# Patient Record
Sex: MALE | Race: WHITE | Employment: OTHER | URBAN - METROPOLITAN AREA
[De-identification: names, ages, dates, MRNs, and addresses within clinical notes are randomized per-mention and may not be internally consistent; named-entity substitution may affect disease eponyms.]

---

## 2017-03-07 PROBLEM — M48.07 SPINAL STENOSIS, LUMBOSACRAL REGION: Status: ACTIVE | Noted: 2017-03-07

## 2017-05-05 PROBLEM — E11.42 DIABETIC PERIPHERAL NEUROPATHY (HCC): Status: ACTIVE | Noted: 2017-05-05

## 2017-06-13 PROBLEM — D46.20 LOW GRADE MYELODYSPLASTIC SYNDROME LESIONS (HCC): Status: ACTIVE | Noted: 2017-06-13

## 2017-07-21 PROBLEM — M54.50 LOW BACK PAIN: Status: ACTIVE | Noted: 2017-07-21

## 2017-07-21 PROBLEM — M79.605 PAIN OF LEFT LOWER EXTREMITY: Status: ACTIVE | Noted: 2017-07-21

## 2017-09-11 PROBLEM — R74.8 CARDIAC ENZYMES ELEVATED: Status: ACTIVE | Noted: 2017-09-11

## 2017-09-11 PROBLEM — I95.81 POSTPROCEDURAL HYPOTENSION: Status: ACTIVE | Noted: 2017-09-11

## 2017-09-11 LAB
LEFT VENTRICULAR EJECTION FRACTION HIGH VALUE: 65 %
LEFT VENTRICULAR EJECTION FRACTION MODE: NORMAL
LV EF: 60 %

## 2017-09-25 PROBLEM — K92.2 LOWER GI BLEED: Status: ACTIVE | Noted: 2017-09-25

## 2017-09-29 PROBLEM — E43 SEVERE PROTEIN-CALORIE MALNUTRITION (HCC): Chronic | Status: ACTIVE | Noted: 2017-09-29

## 2018-03-02 PROBLEM — R07.9 CHEST PAIN: Status: ACTIVE | Noted: 2018-03-02

## 2018-03-03 PROBLEM — K85.90 ACUTE PANCREATITIS: Status: ACTIVE | Noted: 2018-03-03

## 2018-03-22 ENCOUNTER — HOSPITAL ENCOUNTER (OUTPATIENT)
Age: 83
Discharge: HOME | End: 2018-03-24
Payer: MEDICARE

## 2018-03-22 DIAGNOSIS — D72.829 LEUKOCYTOSIS, UNSPECIFIED TYPE: ICD-10-CM

## 2018-03-22 DIAGNOSIS — E11.69 TYPE 2 DIABETES MELLITUS WITH OTHER SPECIFIED COMPLICATION, UNSPECIFIED LONG TERM INSULIN USE STATUS: Chronic | ICD-10-CM

## 2018-03-22 LAB
BASOPHILS ABSOLUTE: 0.1 E9/L (ref 0–0.2)
BASOPHILS RELATIVE PERCENT: 0.8 % (ref 0–2)
EOSINOPHILS ABSOLUTE: 0.19 E9/L (ref 0.05–0.5)
EOSINOPHILS RELATIVE PERCENT: 1.5 % (ref 0–6)
HBA1C MFR BLD: 5.5 % (ref 4.8–5.9)
HCT VFR BLD CALC: 34.2 % (ref 37–54)
HEMOGLOBIN: 10.6 G/DL (ref 12.5–16.5)
IMMATURE GRANULOCYTES #: 0.11 E9/L
IMMATURE GRANULOCYTES %: 0.9 % (ref 0–5)
LYMPHOCYTES ABSOLUTE: 1.76 E9/L (ref 1.5–4)
LYMPHOCYTES RELATIVE PERCENT: 13.7 % (ref 20–42)
MCH RBC QN AUTO: 26.2 PG (ref 26–35)
MCHC RBC AUTO-ENTMCNC: 31 % (ref 32–34.5)
MCV RBC AUTO: 84.4 FL (ref 80–99.9)
MONOCYTES ABSOLUTE: 1.3 E9/L (ref 0.1–0.95)
MONOCYTES RELATIVE PERCENT: 10.1 % (ref 2–12)
NEUTROPHILS ABSOLUTE: 9.4 E9/L (ref 1.8–7.3)
NEUTROPHILS RELATIVE PERCENT: 73 % (ref 43–80)
PDW BLD-RTO: 18.9 FL (ref 11.5–15)
PLATELET # BLD: 325 E9/L (ref 130–450)
PMV BLD AUTO: 12.5 FL (ref 7–12)
RBC # BLD: 4.05 E12/L (ref 3.8–5.8)
WBC # BLD: 12.9 E9/L (ref 4.5–11.5)

## 2018-03-22 PROCEDURE — 85025 COMPLETE CBC W/AUTO DIFF WBC: CPT

## 2018-03-22 PROCEDURE — 83036 HEMOGLOBIN GLYCOSYLATED A1C: CPT

## 2018-03-26 ENCOUNTER — TELEPHONE (OUTPATIENT)
Dept: CARDIOLOGY CLINIC | Age: 83
End: 2018-03-26

## 2018-04-05 PROBLEM — E43 SEVERE PROTEIN-CALORIE MALNUTRITION (HCC): Chronic | Status: RESOLVED | Noted: 2017-09-29 | Resolved: 2018-04-05

## 2018-04-10 ENCOUNTER — HOSPITAL ENCOUNTER (OUTPATIENT)
Age: 83
Discharge: HOME | End: 2018-04-12

## 2018-04-10 PROCEDURE — 88304 TISSUE EXAM BY PATHOLOGIST: CPT

## 2018-04-20 RX ORDER — ALLOPURINOL 300 MG/1
300 TABLET ORAL DAILY
Qty: 30 TABLET | Refills: 11 | Status: SHIPPED
Start: 2018-04-20 | End: 2020-07-10 | Stop reason: SDUPTHER

## 2018-06-22 ENCOUNTER — HOSPITAL ENCOUNTER (OUTPATIENT)
Age: 83
Discharge: HOME | End: 2018-06-24
Payer: MEDICARE

## 2018-06-22 DIAGNOSIS — D46.20 LOW GRADE MYELODYSPLASTIC SYNDROME LESIONS (HCC): ICD-10-CM

## 2018-06-22 DIAGNOSIS — E78.5 DYSLIPIDEMIA: Chronic | ICD-10-CM

## 2018-06-22 DIAGNOSIS — E11.42 DIABETIC PERIPHERAL NEUROPATHY (HCC): ICD-10-CM

## 2018-06-22 LAB
ALBUMIN SERPL-MCNC: 4.1 G/DL (ref 3.5–5.2)
ALP BLD-CCNC: 37 U/L (ref 40–129)
ALT SERPL-CCNC: 17 U/L (ref 0–40)
ANION GAP SERPL CALCULATED.3IONS-SCNC: 12 MMOL/L (ref 7–16)
AST SERPL-CCNC: 28 U/L (ref 0–39)
BASOPHILS ABSOLUTE: 0.13 E9/L (ref 0–0.2)
BASOPHILS RELATIVE PERCENT: 1.1 % (ref 0–2)
BILIRUB SERPL-MCNC: 0.3 MG/DL (ref 0–1.2)
BUN BLDV-MCNC: 20 MG/DL (ref 8–23)
CALCIUM SERPL-MCNC: 9.3 MG/DL (ref 8.6–10.2)
CHLORIDE BLD-SCNC: 97 MMOL/L (ref 98–107)
CHOLESTEROL, TOTAL: 149 MG/DL (ref 0–199)
CO2: 23 MMOL/L (ref 22–29)
CREAT SERPL-MCNC: 1 MG/DL (ref 0.7–1.2)
EOSINOPHILS ABSOLUTE: 0.23 E9/L (ref 0.05–0.5)
EOSINOPHILS RELATIVE PERCENT: 2 % (ref 0–6)
GFR AFRICAN AMERICAN: >60
GFR NON-AFRICAN AMERICAN: >60 ML/MIN/1.73
GLUCOSE BLD-MCNC: 101 MG/DL (ref 74–109)
HBA1C MFR BLD: 5.9 % (ref 4.8–5.9)
HCT VFR BLD CALC: 36.3 % (ref 37–54)
HDLC SERPL-MCNC: 35 MG/DL
HEMOGLOBIN: 11.4 G/DL (ref 12.5–16.5)
IMMATURE GRANULOCYTES #: 0.09 E9/L
IMMATURE GRANULOCYTES %: 0.8 % (ref 0–5)
LDL CHOLESTEROL CALCULATED: 100 MG/DL (ref 0–99)
LYMPHOCYTES ABSOLUTE: 1.69 E9/L (ref 1.5–4)
LYMPHOCYTES RELATIVE PERCENT: 14.9 % (ref 20–42)
MCH RBC QN AUTO: 27.3 PG (ref 26–35)
MCHC RBC AUTO-ENTMCNC: 31.4 % (ref 32–34.5)
MCV RBC AUTO: 87.1 FL (ref 80–99.9)
MONOCYTES ABSOLUTE: 1.1 E9/L (ref 0.1–0.95)
MONOCYTES RELATIVE PERCENT: 9.7 % (ref 2–12)
NEUTROPHILS ABSOLUTE: 8.1 E9/L (ref 1.8–7.3)
NEUTROPHILS RELATIVE PERCENT: 71.5 % (ref 43–80)
PDW BLD-RTO: 15.9 FL (ref 11.5–15)
PLATELET # BLD: 332 E9/L (ref 130–450)
PMV BLD AUTO: 12 FL (ref 7–12)
POTASSIUM SERPL-SCNC: 4.7 MMOL/L (ref 3.5–5)
RBC # BLD: 4.17 E12/L (ref 3.8–5.8)
SODIUM BLD-SCNC: 132 MMOL/L (ref 132–146)
TOTAL PROTEIN: 8.7 G/DL (ref 6.4–8.3)
TRIGL SERPL-MCNC: 69 MG/DL (ref 0–149)
VLDLC SERPL CALC-MCNC: 14 MG/DL
WBC # BLD: 11.3 E9/L (ref 4.5–11.5)

## 2018-06-22 PROCEDURE — 83036 HEMOGLOBIN GLYCOSYLATED A1C: CPT

## 2018-06-22 PROCEDURE — 80061 LIPID PANEL: CPT

## 2018-06-22 PROCEDURE — 80053 COMPREHEN METABOLIC PANEL: CPT

## 2018-06-22 PROCEDURE — 85025 COMPLETE CBC W/AUTO DIFF WBC: CPT

## 2018-08-23 ENCOUNTER — OFFICE VISIT (OUTPATIENT)
Dept: CARDIOLOGY CLINIC | Age: 83
End: 2018-08-23
Payer: MEDICARE

## 2018-08-23 VITALS
BODY MASS INDEX: 28.61 KG/M2 | RESPIRATION RATE: 18 BRPM | SYSTOLIC BLOOD PRESSURE: 122 MMHG | WEIGHT: 178 LBS | HEART RATE: 71 BPM | DIASTOLIC BLOOD PRESSURE: 70 MMHG | HEIGHT: 66 IN

## 2018-08-23 DIAGNOSIS — M48.07 SPINAL STENOSIS, LUMBOSACRAL REGION: ICD-10-CM

## 2018-08-23 DIAGNOSIS — I51.9 DIASTOLIC DYSFUNCTION, LEFT VENTRICLE: ICD-10-CM

## 2018-08-23 DIAGNOSIS — D63.8 ANEMIA OF CHRONIC DISEASE: ICD-10-CM

## 2018-08-23 DIAGNOSIS — E11.51 TYPE 2 DIABETES MELLITUS WITH DIABETIC PERIPHERAL ANGIOPATHY WITHOUT GANGRENE, WITHOUT LONG-TERM CURRENT USE OF INSULIN (HCC): ICD-10-CM

## 2018-08-23 DIAGNOSIS — I34.0 NON-RHEUMATIC MITRAL REGURGITATION: ICD-10-CM

## 2018-08-23 DIAGNOSIS — R06.09 DOE (DYSPNEA ON EXERTION): ICD-10-CM

## 2018-08-23 DIAGNOSIS — E66.09 NON MORBID OBESITY DUE TO EXCESS CALORIES: ICD-10-CM

## 2018-08-23 DIAGNOSIS — I25.10 CORONARY ARTERY DISEASE INVOLVING NATIVE CORONARY ARTERY OF NATIVE HEART WITHOUT ANGINA PECTORIS: Primary | Chronic | ICD-10-CM

## 2018-08-23 PROCEDURE — 1101F PT FALLS ASSESS-DOCD LE1/YR: CPT | Performed by: INTERNAL MEDICINE

## 2018-08-23 PROCEDURE — 93000 ELECTROCARDIOGRAM COMPLETE: CPT | Performed by: INTERNAL MEDICINE

## 2018-08-23 PROCEDURE — 1036F TOBACCO NON-USER: CPT | Performed by: INTERNAL MEDICINE

## 2018-08-23 PROCEDURE — G8598 ASA/ANTIPLAT THER USED: HCPCS | Performed by: INTERNAL MEDICINE

## 2018-08-23 PROCEDURE — 4040F PNEUMOC VAC/ADMIN/RCVD: CPT | Performed by: INTERNAL MEDICINE

## 2018-08-23 PROCEDURE — G8427 DOCREV CUR MEDS BY ELIG CLIN: HCPCS | Performed by: INTERNAL MEDICINE

## 2018-08-23 PROCEDURE — G8417 CALC BMI ABV UP PARAM F/U: HCPCS | Performed by: INTERNAL MEDICINE

## 2018-08-23 PROCEDURE — 99214 OFFICE O/P EST MOD 30 MIN: CPT | Performed by: INTERNAL MEDICINE

## 2018-08-23 PROCEDURE — 1123F ACP DISCUSS/DSCN MKR DOCD: CPT | Performed by: INTERNAL MEDICINE

## 2018-08-23 RX ORDER — LOSARTAN POTASSIUM 25 MG/1
25 TABLET ORAL 2 TIMES DAILY
COMMUNITY
End: 2020-01-21 | Stop reason: SDUPTHER

## 2018-08-23 NOTE — PROGRESS NOTES
OFFICE VISIT     PRIMARY CARE PHYSICIAN:      Sukhjinder Lobo MD       ALLERGIES / SENSITIVITIES:        Allergies   Allergen Reactions    Iodine Rash     IV Iodine    Benadryl [Diphenhydramine]      IV BENADRYL     Ramipril      cough          REVIEWED MEDICATIONS:        Current Outpatient Prescriptions:     losartan (COZAAR) 25 MG tablet, Take 25 mg by mouth 2 times daily, Disp: , Rfl:     allopurinol (ZYLOPRIM) 300 MG tablet, Take 1 tablet by mouth daily, Disp: 30 tablet, Rfl: 11    nateglinide (STARLIX) 120 MG tablet, Take 1 tablet by mouth 3 times daily (before meals), Disp: 90 tablet, Rfl: 5    metFORMIN (GLUCOPHAGE) 1000 MG tablet, Take 1 tablet by mouth 2 times daily (with meals), Disp: 60 tablet, Rfl: 3    zolpidem (AMBIEN) 5 MG tablet, Take 5 mg by mouth nightly as needed for Sleep, Disp: , Rfl:     docusate sodium (COLACE) 100 MG capsule, Take 1 capsule by mouth daily as needed for Constipation, Disp: , Rfl:     carvedilol (COREG) 3.125 MG tablet, Take 1 tablet by mouth 2 times daily, Disp: 180 tablet, Rfl: 3    isosorbide mononitrate (IMDUR) 30 MG extended release tablet, Take 1 tablet by mouth daily, Disp: 90 tablet, Rfl: 3    omeprazole (PRILOSEC) 20 MG delayed release capsule, Take 20 mg by mouth daily PRN, Disp: , Rfl:     Cholecalciferol (VITAMIN D-3) 5000 UNITS TABS, Take  by mouth daily. , Disp: , Rfl:     aspirin 81 MG EC tablet, Take 81 mg by mouth daily. , Disp: , Rfl:     vitamin B-12 (CYANOCOBALAMIN) 1000 MCG tablet, Take 1,000 mcg by mouth daily.   , Disp: , Rfl:     folic acid (FOLVITE) 784 MCG tablet, Take 400 mcg by mouth daily , Disp: , Rfl:     potassium chloride (KLOR-CON) 20 MEQ packet, Take 20 mEq by mouth daily, Disp: 30 each, Rfl: 3    nitroGLYCERIN (NITROSTAT) 0.4 MG SL tablet, Place 1 tablet under the tongue every 5 minutes as needed for Chest pain, Disp: 25 tablet, Rfl: 3      S: REASON FOR VISIT:       Chief Complaint   Patient presents with    Coronary Artery Disease     6 month ov; pt hosp in 3/18 with abd pain--s/p lap choley done 4/10/18; he denies any CP, says some JANE with mod activity; labs of 6/18 in epic          History of Present Illness:       Office Visit for follow up of  CAD, HTN   Had LAP Cholecystectomy in 4/2018     Emile any exertional chest pain   Continue to have mild JANE, unchanged   Still does regular exercises/treadmill   No palpitations, dizzy or syncope. Active at home   No orthopnea   Try to watch diet   Compliant with all medications       Past Medical History:   Diagnosis Date    Anal fissure 10/27/2016    Anemia     Arthritis     CAD (coronary artery disease)     Diabetes mellitus (HCC)     ADULT ONSET    GERD (gastroesophageal reflux disease) 10/27/2016    Gout     History of blood transfusion     Hyperlipidemia     Low back pain 7/21/2017    Low grade myelodysplastic syndrome lesions ([address] 6/13/2017    OA (osteoarthritis) 10/27/2016    Pain of left lower extremity 7/21/2017    Rectal fissure 1987            Past Surgical History:   Procedure Laterality Date    CHOLECYSTECTOMY, LAPAROSCOPIC  04/10/2018    Dr Arambula Shoulder  01/16/89    PTCA/PROXIMAL LAD, PTCA MID LAD ([facility])    CORONARY ANGIOPLASTY  04/12/89    REPEAT PTCA TO MID LAD ([facility])    CORONARY ANGIOPLASTY  09/22/11    PTCA/ROTATIONAL ATHERECTOMY & DOUG TO MID LAD, PTCA TO OSTIUM OF DIAGONAL BRANCH THROUGH STENTED SEGMENT IN  LAD.     CORONARY ANGIOPLASTY  10/19/11    PTCA/DOUG TO PROXIMAL RCA (DR. YOUNGER)    CORONARY ANGIOPLASTY WITH STENT PLACEMENT  12/3/15    Dr. Eduard Godoy 3.0x15 RCA    DIAGNOSTIC CARDIAC CATH LAB PROCEDURE  01/11/89    @[facility]    DIAGNOSTIC CARDIAC CATH LAB PROCEDURE  01/16/89    @[facility]    DIAGNOSTIC CARDIAC CATH LAB PROCEDURE  09/22/11    ENDOSCOPY, COLON, DIAGNOSTIC      JOINT REPLACEMENT  1999    KNEE    LUMBAR LAMINECTOMY  09/08/2017    lumbar laminectomy  kl4-s1    NM TUMOR LOCALIZATION SPECT MULTI DAY 08/15/11    CTA CORONARIES:  PROXIMAL TO MID LAD STENOSIS, PROXIMAL AND DISTAL RCA STENOSIS, CALCIUM SCORE 2594.  TOTAL KNEE ARTHROPLASTY  10/99    BILATERAL    UPPER GASTROINTESTINAL ENDOSCOPY  09/26/2017    with colonoscopy          Family History   Problem Relation Age of Onset    Heart Disease Brother     Diabetes Brother     Parkinsonism Brother           Social History   Substance Use Topics    Smoking status: Never Smoker    Smokeless tobacco: Never Used    Alcohol use 0.0 oz/week      Comment: rare mixed drink          Review of Systems:  HEENT: negative for acute visual symptoms or auditory problems, no dysphagia  Constitutional: negative for fever and chills, or significant weight loss  Respiratory: negative for cough, wheezing, or hemoptysis  Cardiovascular: negative for chest pain, palpitations, and +ve dyspnea  Gastrointestinal: negative for abdominal pain, diarrhea, nausea and vomiting  Endocrine: Negative for polyuria and polydyspsia  Genitourinary:negative for dysuria and hematuria  Derm: negative for rash and skin lesion(s)  Neurological: negative for tingling, numbness, weakness, seizures and tremors  Endocrine: negative for polydipsia and polyuria  Musculoskeletal: negative for pain or tenderness  Psychiatric: negative for anxiety, depression, or suicidal ideations         O:  COMPLETE PHYSICAL EXAM:       /70   Pulse 71   Resp 18   Ht 5' 6\" (1.676 m)   Wt 178 lb (80.7 kg)   BMI 28.73 kg/m²       General:   Patient alert, comfortable, no distress. Appears stated age. HEENT:    Pupils equal, no icterus, no nasal drainage   Neck:              No masses, Thyroid not palpable. Chest:   Normal configuration, non tender. Lungs:   Clear to auscultation bilaterally, few scattered rhonchi. Cardiovascular:  Regular rhythm, 1/6 systolic murmur at apical area;, No S3,no palpable thrills, No elevated JVD, No carotid bruit.    Abdomen:  Soft, Bowel sounds normal   Extremities:  No

## 2018-09-13 ENCOUNTER — OFFICE VISIT (OUTPATIENT)
Dept: NEUROSURGERY | Age: 83
End: 2018-09-13
Payer: MEDICARE

## 2018-09-13 VITALS
HEIGHT: 67 IN | HEART RATE: 74 BPM | DIASTOLIC BLOOD PRESSURE: 69 MMHG | BODY MASS INDEX: 28.25 KG/M2 | WEIGHT: 180 LBS | SYSTOLIC BLOOD PRESSURE: 119 MMHG

## 2018-09-13 DIAGNOSIS — M54.50 MIDLINE LOW BACK PAIN WITHOUT SCIATICA, UNSPECIFIED CHRONICITY: Primary | ICD-10-CM

## 2018-09-13 PROCEDURE — 1101F PT FALLS ASSESS-DOCD LE1/YR: CPT | Performed by: NEUROLOGICAL SURGERY

## 2018-09-13 PROCEDURE — G8417 CALC BMI ABV UP PARAM F/U: HCPCS | Performed by: NEUROLOGICAL SURGERY

## 2018-09-13 PROCEDURE — 1123F ACP DISCUSS/DSCN MKR DOCD: CPT | Performed by: NEUROLOGICAL SURGERY

## 2018-09-13 PROCEDURE — G8598 ASA/ANTIPLAT THER USED: HCPCS | Performed by: NEUROLOGICAL SURGERY

## 2018-09-13 PROCEDURE — 1036F TOBACCO NON-USER: CPT | Performed by: NEUROLOGICAL SURGERY

## 2018-09-13 PROCEDURE — G8427 DOCREV CUR MEDS BY ELIG CLIN: HCPCS | Performed by: NEUROLOGICAL SURGERY

## 2018-09-13 PROCEDURE — 4040F PNEUMOC VAC/ADMIN/RCVD: CPT | Performed by: NEUROLOGICAL SURGERY

## 2018-09-13 PROCEDURE — 99213 OFFICE O/P EST LOW 20 MIN: CPT | Performed by: NEUROLOGICAL SURGERY

## 2018-09-28 ENCOUNTER — HOSPITAL ENCOUNTER (OUTPATIENT)
Age: 83
Discharge: HOME | End: 2018-09-30
Payer: MEDICARE

## 2018-09-28 DIAGNOSIS — E11.42 DIABETIC PERIPHERAL NEUROPATHY (HCC): ICD-10-CM

## 2018-09-28 DIAGNOSIS — R53.83 FATIGUE, UNSPECIFIED TYPE: ICD-10-CM

## 2018-09-28 DIAGNOSIS — E78.01 FAMILIAL HYPERCHOLESTEROLEMIA: ICD-10-CM

## 2018-09-28 LAB
ALBUMIN SERPL-MCNC: 4.1 G/DL (ref 3.5–5.2)
ALP BLD-CCNC: 40 U/L (ref 40–129)
ALT SERPL-CCNC: 12 U/L (ref 0–40)
ANION GAP SERPL CALCULATED.3IONS-SCNC: 19 MMOL/L (ref 7–16)
AST SERPL-CCNC: 33 U/L (ref 0–39)
BASOPHILS ABSOLUTE: 0.16 E9/L (ref 0–0.2)
BASOPHILS RELATIVE PERCENT: 1.4 % (ref 0–2)
BILIRUB SERPL-MCNC: 0.3 MG/DL (ref 0–1.2)
BUN BLDV-MCNC: 20 MG/DL (ref 8–23)
CALCIUM SERPL-MCNC: 9.5 MG/DL (ref 8.6–10.2)
CHLORIDE BLD-SCNC: 98 MMOL/L (ref 98–107)
CHOLESTEROL, TOTAL: 149 MG/DL (ref 0–199)
CO2: 19 MMOL/L (ref 22–29)
CREAT SERPL-MCNC: 1.1 MG/DL (ref 0.7–1.2)
EOSINOPHILS ABSOLUTE: 0.15 E9/L (ref 0.05–0.5)
EOSINOPHILS RELATIVE PERCENT: 1.3 % (ref 0–6)
GFR AFRICAN AMERICAN: >60
GFR NON-AFRICAN AMERICAN: >60 ML/MIN/1.73
GLUCOSE BLD-MCNC: 104 MG/DL (ref 74–109)
HBA1C MFR BLD: 6 % (ref 4–5.6)
HCT VFR BLD CALC: 40.7 % (ref 37–54)
HDLC SERPL-MCNC: 36 MG/DL
HEMOGLOBIN: 12.1 G/DL (ref 12.5–16.5)
IMMATURE GRANULOCYTES #: 0.07 E9/L
IMMATURE GRANULOCYTES %: 0.6 % (ref 0–5)
LDL CHOLESTEROL CALCULATED: 94 MG/DL (ref 0–99)
LYMPHOCYTES ABSOLUTE: 1.9 E9/L (ref 1.5–4)
LYMPHOCYTES RELATIVE PERCENT: 17.1 % (ref 20–42)
MCH RBC QN AUTO: 26.7 PG (ref 26–35)
MCHC RBC AUTO-ENTMCNC: 29.7 % (ref 32–34.5)
MCV RBC AUTO: 89.6 FL (ref 80–99.9)
MONOCYTES ABSOLUTE: 1.16 E9/L (ref 0.1–0.95)
MONOCYTES RELATIVE PERCENT: 10.4 % (ref 2–12)
NEUTROPHILS ABSOLUTE: 7.68 E9/L (ref 1.8–7.3)
NEUTROPHILS RELATIVE PERCENT: 69.2 % (ref 43–80)
PDW BLD-RTO: 15.5 FL (ref 11.5–15)
PLATELET # BLD: 375 E9/L (ref 130–450)
PMV BLD AUTO: 12.2 FL (ref 7–12)
POTASSIUM SERPL-SCNC: 5.2 MMOL/L (ref 3.5–5)
RBC # BLD: 4.54 E12/L (ref 3.8–5.8)
SODIUM BLD-SCNC: 136 MMOL/L (ref 132–146)
TOTAL PROTEIN: 8.4 G/DL (ref 6.4–8.3)
TRIGL SERPL-MCNC: 95 MG/DL (ref 0–149)
VLDLC SERPL CALC-MCNC: 19 MG/DL
WBC # BLD: 11.1 E9/L (ref 4.5–11.5)

## 2018-09-28 PROCEDURE — 85025 COMPLETE CBC W/AUTO DIFF WBC: CPT

## 2018-09-28 PROCEDURE — 80061 LIPID PANEL: CPT

## 2018-09-28 PROCEDURE — 83036 HEMOGLOBIN GLYCOSYLATED A1C: CPT

## 2018-09-28 PROCEDURE — 80053 COMPREHEN METABOLIC PANEL: CPT

## 2018-11-12 ENCOUNTER — HOSPITAL ENCOUNTER (OUTPATIENT)
Age: 83
Discharge: HOME | End: 2018-11-14
Payer: MEDICARE

## 2018-11-12 PROCEDURE — 84550 ASSAY OF BLOOD/URIC ACID: CPT

## 2018-11-12 PROCEDURE — 84478 ASSAY OF TRIGLYCERIDES: CPT

## 2018-11-12 PROCEDURE — 83615 LACTATE (LD) (LDH) ENZYME: CPT

## 2018-11-12 PROCEDURE — 80053 COMPREHEN METABOLIC PANEL: CPT

## 2018-11-12 PROCEDURE — 82465 ASSAY BLD/SERUM CHOLESTEROL: CPT

## 2018-11-13 LAB
ALBUMIN SERPL-MCNC: 4 G/DL (ref 3.5–5.2)
ALP BLD-CCNC: 46 U/L (ref 40–129)
ALT SERPL-CCNC: 13 U/L (ref 0–40)
ANION GAP SERPL CALCULATED.3IONS-SCNC: 11 MMOL/L (ref 7–16)
AST SERPL-CCNC: 19 U/L (ref 0–39)
BILIRUB SERPL-MCNC: 0.3 MG/DL (ref 0–1.2)
BUN BLDV-MCNC: 16 MG/DL (ref 8–23)
CALCIUM SERPL-MCNC: 9.1 MG/DL (ref 8.6–10.2)
CHLORIDE BLD-SCNC: 100 MMOL/L (ref 98–107)
CHOLESTEROL, TOTAL: 132 MG/DL (ref 0–199)
CO2: 22 MMOL/L (ref 22–29)
CREAT SERPL-MCNC: 1 MG/DL (ref 0.7–1.2)
GFR AFRICAN AMERICAN: >60
GFR NON-AFRICAN AMERICAN: >60 ML/MIN/1.73
GLUCOSE BLD-MCNC: 112 MG/DL (ref 74–99)
LACTATE DEHYDROGENASE: 188 U/L (ref 135–225)
POTASSIUM SERPL-SCNC: 4.9 MMOL/L (ref 3.5–5)
SODIUM BLD-SCNC: 133 MMOL/L (ref 132–146)
TOTAL PROTEIN: 8 G/DL (ref 6.4–8.3)
TRIGL SERPL-MCNC: 113 MG/DL (ref 0–149)
URIC ACID, SERUM: 6 MG/DL (ref 3.4–7)

## 2019-01-02 ENCOUNTER — TELEPHONE (OUTPATIENT)
Dept: NEUROSURGERY | Age: 84
End: 2019-01-02

## 2019-01-02 DIAGNOSIS — Z98.890 S/P LUMBAR LAMINECTOMY: Primary | ICD-10-CM

## 2019-01-14 ENCOUNTER — HOSPITAL ENCOUNTER (OUTPATIENT)
Age: 84
Discharge: HOME | End: 2019-01-14
Payer: MEDICARE

## 2019-01-14 DIAGNOSIS — E11.42 DIABETIC PERIPHERAL NEUROPATHY (HCC): ICD-10-CM

## 2019-01-14 LAB
ALBUMIN SERPL-MCNC: 4.5 G/DL (ref 3.5–5.2)
ALP BLD-CCNC: 47 U/L (ref 40–129)
ALT SERPL-CCNC: 15 U/L (ref 0–40)
ANION GAP SERPL CALCULATED.3IONS-SCNC: 10 MMOL/L (ref 7–16)
AST SERPL-CCNC: 18 U/L (ref 0–39)
BILIRUB SERPL-MCNC: 0.3 MG/DL (ref 0–1.2)
BUN BLDV-MCNC: 20 MG/DL (ref 8–23)
CALCIUM SERPL-MCNC: 9.2 MG/DL (ref 8.6–10.2)
CHLORIDE BLD-SCNC: 102 MMOL/L (ref 98–107)
CO2: 26 MMOL/L (ref 22–29)
CREAT SERPL-MCNC: 1.2 MG/DL (ref 0.7–1.2)
GFR AFRICAN AMERICAN: >60
GFR NON-AFRICAN AMERICAN: 58 ML/MIN/1.73
GLUCOSE BLD-MCNC: 103 MG/DL (ref 74–99)
HBA1C MFR BLD: 5.4 % (ref 4–5.6)
POTASSIUM SERPL-SCNC: 4.8 MMOL/L (ref 3.5–5)
SODIUM BLD-SCNC: 138 MMOL/L (ref 132–146)
TOTAL PROTEIN: 8.4 G/DL (ref 6.4–8.3)

## 2019-01-14 PROCEDURE — 80053 COMPREHEN METABOLIC PANEL: CPT

## 2019-01-14 PROCEDURE — 83036 HEMOGLOBIN GLYCOSYLATED A1C: CPT

## 2019-01-14 PROCEDURE — 36415 COLL VENOUS BLD VENIPUNCTURE: CPT

## 2019-02-04 PROBLEM — E78.2 HYPERLIPIDEMIA, MIXED: Status: ACTIVE | Noted: 2019-02-04

## 2019-02-04 RX ORDER — CARVEDILOL 3.12 MG/1
3.12 TABLET ORAL 2 TIMES DAILY
Qty: 180 TABLET | Refills: 3 | Status: SHIPPED
Start: 2019-02-04 | End: 2021-02-17 | Stop reason: SDUPTHER

## 2019-04-17 RX ORDER — ISOSORBIDE MONONITRATE 30 MG/1
30 TABLET, EXTENDED RELEASE ORAL DAILY
Qty: 90 TABLET | Refills: 3 | Status: SHIPPED
Start: 2019-04-17 | End: 2021-05-10 | Stop reason: SDUPTHER

## 2019-04-24 ENCOUNTER — HOSPITAL ENCOUNTER (OUTPATIENT)
Age: 84
Discharge: HOME | End: 2019-04-24
Payer: MEDICARE

## 2019-04-24 DIAGNOSIS — E78.5 DYSLIPIDEMIA: Chronic | ICD-10-CM

## 2019-04-24 DIAGNOSIS — E11.9 WELL CONTROLLED TYPE 2 DIABETES MELLITUS (HCC): ICD-10-CM

## 2019-04-24 LAB
ALBUMIN SERPL-MCNC: 4.2 G/DL (ref 3.5–5.2)
ALP BLD-CCNC: 47 U/L (ref 40–129)
ALT SERPL-CCNC: 14 U/L (ref 0–40)
ANION GAP SERPL CALCULATED.3IONS-SCNC: 10 MMOL/L (ref 7–16)
AST SERPL-CCNC: 19 U/L (ref 0–39)
BILIRUB SERPL-MCNC: 0.3 MG/DL (ref 0–1.2)
BUN BLDV-MCNC: 18 MG/DL (ref 8–23)
CALCIUM SERPL-MCNC: 9.3 MG/DL (ref 8.6–10.2)
CHLORIDE BLD-SCNC: 99 MMOL/L (ref 98–107)
CHOLESTEROL, TOTAL: 137 MG/DL (ref 0–199)
CO2: 24 MMOL/L (ref 22–29)
CREAT SERPL-MCNC: 1.2 MG/DL (ref 0.7–1.2)
GFR AFRICAN AMERICAN: >60
GFR NON-AFRICAN AMERICAN: 58 ML/MIN/1.73
GLUCOSE BLD-MCNC: 111 MG/DL (ref 74–99)
HBA1C MFR BLD: 6.4 % (ref 4–5.6)
HDLC SERPL-MCNC: 37 MG/DL
LDL CHOLESTEROL CALCULATED: 82 MG/DL (ref 0–99)
POTASSIUM SERPL-SCNC: 4.8 MMOL/L (ref 3.5–5)
SODIUM BLD-SCNC: 133 MMOL/L (ref 132–146)
TOTAL PROTEIN: 8.3 G/DL (ref 6.4–8.3)
TRIGL SERPL-MCNC: 89 MG/DL (ref 0–149)
VLDLC SERPL CALC-MCNC: 18 MG/DL

## 2019-04-24 PROCEDURE — 36415 COLL VENOUS BLD VENIPUNCTURE: CPT

## 2019-04-24 PROCEDURE — 80061 LIPID PANEL: CPT

## 2019-04-24 PROCEDURE — 83036 HEMOGLOBIN GLYCOSYLATED A1C: CPT

## 2019-04-24 PROCEDURE — 80053 COMPREHEN METABOLIC PANEL: CPT

## 2020-01-27 ENCOUNTER — HOSPITAL ENCOUNTER (OUTPATIENT)
Age: 85
Discharge: HOME | End: 2020-01-27
Payer: MEDICARE

## 2020-01-27 LAB
ALBUMIN SERPL-MCNC: 4.5 G/DL (ref 3.5–5.2)
ALP BLD-CCNC: 48 U/L (ref 40–129)
ALT SERPL-CCNC: 14 U/L (ref 0–40)
ANION GAP SERPL CALCULATED.3IONS-SCNC: 12 MMOL/L (ref 7–16)
AST SERPL-CCNC: 16 U/L (ref 0–39)
BASOPHILS ABSOLUTE: 0.08 E9/L (ref 0–0.2)
BASOPHILS RELATIVE PERCENT: 0.9 % (ref 0–2)
BILIRUB SERPL-MCNC: 0.4 MG/DL (ref 0–1.2)
BUN BLDV-MCNC: 20 MG/DL (ref 8–23)
CALCIUM SERPL-MCNC: 9.6 MG/DL (ref 8.6–10.2)
CHLORIDE BLD-SCNC: 98 MMOL/L (ref 98–107)
CHOLESTEROL, TOTAL: 156 MG/DL (ref 0–199)
CO2: 25 MMOL/L (ref 22–29)
CREAT SERPL-MCNC: 1.2 MG/DL (ref 0.7–1.2)
EOSINOPHILS ABSOLUTE: 0.44 E9/L (ref 0.05–0.5)
EOSINOPHILS RELATIVE PERCENT: 5.1 % (ref 0–6)
GFR AFRICAN AMERICAN: >60
GFR NON-AFRICAN AMERICAN: 58 ML/MIN/1.73
GLUCOSE BLD-MCNC: 107 MG/DL (ref 74–99)
HBA1C MFR BLD: 6.1 % (ref 4–5.6)
HCT VFR BLD CALC: 39.7 % (ref 37–54)
HDLC SERPL-MCNC: 39 MG/DL
HEMOGLOBIN: 12 G/DL (ref 12.5–16.5)
IMMATURE GRANULOCYTES #: 0.09 E9/L
IMMATURE GRANULOCYTES %: 1 % (ref 0–5)
LDL CHOLESTEROL CALCULATED: 96 MG/DL (ref 0–99)
LYMPHOCYTES ABSOLUTE: 1.83 E9/L (ref 1.5–4)
LYMPHOCYTES RELATIVE PERCENT: 21.1 % (ref 20–42)
MCH RBC QN AUTO: 26.7 PG (ref 26–35)
MCHC RBC AUTO-ENTMCNC: 30.2 % (ref 32–34.5)
MCV RBC AUTO: 88.4 FL (ref 80–99.9)
MONOCYTES ABSOLUTE: 1.16 E9/L (ref 0.1–0.95)
MONOCYTES RELATIVE PERCENT: 13.4 % (ref 2–12)
NEUTROPHILS ABSOLUTE: 5.07 E9/L (ref 1.8–7.3)
NEUTROPHILS RELATIVE PERCENT: 58.5 % (ref 43–80)
PDW BLD-RTO: 14.7 FL (ref 11.5–15)
PLATELET # BLD: 432 E9/L (ref 130–450)
PMV BLD AUTO: 12.9 FL (ref 7–12)
POTASSIUM SERPL-SCNC: 5.1 MMOL/L (ref 3.5–5)
RBC # BLD: 4.49 E12/L (ref 3.8–5.8)
SODIUM BLD-SCNC: 135 MMOL/L (ref 132–146)
TOTAL PROTEIN: 8.6 G/DL (ref 6.4–8.3)
TRIGL SERPL-MCNC: 106 MG/DL (ref 0–149)
VLDLC SERPL CALC-MCNC: 21 MG/DL
WBC # BLD: 8.7 E9/L (ref 4.5–11.5)

## 2020-01-27 PROCEDURE — 80061 LIPID PANEL: CPT

## 2020-01-27 PROCEDURE — 85025 COMPLETE CBC W/AUTO DIFF WBC: CPT

## 2020-01-27 PROCEDURE — 80053 COMPREHEN METABOLIC PANEL: CPT

## 2020-01-27 PROCEDURE — 83036 HEMOGLOBIN GLYCOSYLATED A1C: CPT

## 2020-01-27 PROCEDURE — 36415 COLL VENOUS BLD VENIPUNCTURE: CPT

## 2020-07-10 RX ORDER — ALLOPURINOL 300 MG/1
300 TABLET ORAL DAILY
Qty: 30 TABLET | Refills: 11 | Status: SHIPPED
Start: 2020-07-10 | End: 2021-04-12 | Stop reason: SDUPTHER

## 2020-10-11 ENCOUNTER — APPOINTMENT (OUTPATIENT)
Dept: CT IMAGING | Age: 85
End: 2020-10-11
Payer: MEDICARE

## 2020-10-11 ENCOUNTER — HOSPITAL ENCOUNTER (EMERGENCY)
Age: 85
Discharge: HOME | End: 2020-10-11
Payer: MEDICARE

## 2020-10-11 ENCOUNTER — APPOINTMENT (OUTPATIENT)
Dept: GENERAL RADIOLOGY | Age: 85
End: 2020-10-11
Payer: MEDICARE

## 2020-10-11 VITALS
HEIGHT: 67 IN | TEMPERATURE: 98 F | OXYGEN SATURATION: 96 % | RESPIRATION RATE: 14 BRPM | SYSTOLIC BLOOD PRESSURE: 125 MMHG | BODY MASS INDEX: 27.47 KG/M2 | HEART RATE: 80 BPM | WEIGHT: 175 LBS | DIASTOLIC BLOOD PRESSURE: 61 MMHG

## 2020-10-11 PROCEDURE — 73560 X-RAY EXAM OF KNEE 1 OR 2: CPT

## 2020-10-11 PROCEDURE — 90715 TDAP VACCINE 7 YRS/> IM: CPT | Performed by: NURSE PRACTITIONER

## 2020-10-11 PROCEDURE — 90471 IMMUNIZATION ADMIN: CPT | Performed by: NURSE PRACTITIONER

## 2020-10-11 PROCEDURE — 99284 EMERGENCY DEPT VISIT MOD MDM: CPT

## 2020-10-11 PROCEDURE — 70450 CT HEAD/BRAIN W/O DYE: CPT

## 2020-10-11 PROCEDURE — 6360000002 HC RX W HCPCS: Performed by: NURSE PRACTITIONER

## 2020-10-11 PROCEDURE — 99283 EMERGENCY DEPT VISIT LOW MDM: CPT

## 2020-10-11 RX ADMIN — TETANUS TOXOID, REDUCED DIPHTHERIA TOXOID AND ACELLULAR PERTUSSIS VACCINE, ADSORBED 0.5 ML: 5; 2.5; 8; 8; 2.5 SUSPENSION INTRAMUSCULAR at 13:46

## 2020-10-11 ASSESSMENT — PAIN SCALES - GENERAL: PAINLEVEL_OUTOF10: 4

## 2020-10-11 ASSESSMENT — PAIN DESCRIPTION - LOCATION: LOCATION: KNEE

## 2020-10-11 ASSESSMENT — PAIN DESCRIPTION - ORIENTATION: ORIENTATION: RIGHT;LEFT

## 2020-10-11 ASSESSMENT — PAIN DESCRIPTION - DESCRIPTORS: DESCRIPTORS: CONSTANT

## 2020-10-11 NOTE — ED PROVIDER NOTES
[facility]       Department of Emergency Medicine   ED  Provider Note  Admit Date/RoomTime: 10/11/2020  1:29 PM  ED Room: 33/    Chief Complaint   Fall (pt having a fall 2 days ago. states he did hit head no loc, no thinners. no pain or blurred vision in head. presents to ED today for bilateral knee pain. history of replacement. ); Head Injury; and Knee Pain (fell onto knees)    History of Present Illness   Source of history provided by:  patient. History/Exam Limitations: none. Ailyn Matute is a 82 y.o. old male who has a past medical history of:   Past Medical History:   Diagnosis Date    Anal fissure 10/27/2016    Anemia     Arthritis     CAD (coronary artery disease)     Diabetes mellitus ([facility] [address]     ADULT ONSET    GERD (gastroesophageal reflux disease) 10/27/2016    Gout     History of blood transfusion     Hyperlipidemia     Low back pain 7/21/2017    Low grade myelodysplastic syndrome lesions ([facility] [facility] 6/13/2017    OA (osteoarthritis) 10/27/2016    Pain of left lower extremity 7/21/2017    Rectal fissure 1987      presents to the emergency department by private vehicle and ambulatory, for a fall which occured 2 day(s) prior to arrival. He was reportedly doing yard work and tripping while at home prior to incident with complaints of bilateral knee pain. The patients tetanus status is unknown. Since onset the symptoms have been moderate in degree. His pain is aggraveated by movement, use and palpation and relieved by rest and heat. He denies any loss of consciousness, neck pain, chest pain, abdominal pain, numbness or weakness. Patient states he had a mechanical fall in the yard 2 days ago. He states he landed on his knees and did hit his head after landing on his knees. Denies any loss of consciousness, neck pain, back pain, pelvis pain, abdominal pain, chest pain, shortness of breath. He states he had to complete knee replacements over 20 years ago.   He states he noticed Iodine; Benadryl [diphenhydramine]; and Ramipril    Physical Exam   Oxygen Saturation Interpretation: Normal.  ED Triage Vitals [10/11/20 1327]   BP Temp Temp Source Pulse Resp SpO2 Height Weight   125/61 98 °F (36.7 °C) Axillary 80 14 96 % 5' 6.5\" (1.689 m) 175 lb (79.4 kg)         Physical Exam  · Constitutional:  Alert, development consistent with age. · HEENT:  NC/NT. Airway patent. · Neck:  No midline or paravertebral tenderness. Normal ROM. Supple. · Chest:  Symmetrical without visible rash or tenderness. · Respiratory:  Lungs Clear to auscultation and breath sounds equal.  · CV:  Regular rate and rhythm, normal heart sounds, without pathological murmurs, ectopy, gallops, or rubs. · GI:  Abdomen Soft, nontender, good bowel sounds. No firm or pulsatile mass. · Pelvis:  Stable, nontender to palpation. · Back:  No midline or paravertebral tenderness. No costovertebral tenderness. Knee:  Bilateral anterior:             Tenderness:  mild. .              Swelling/Effusion: Mild. Deformity: no.              ROM: full range with pain. Skin:  Right-surgical scar and small ecchymosis. Left-surgical scar and small abrasion. .       Hip:            Tenderness:  none. Swelling: None. Deformity: no.              ROM: full range of motion. Skin:  no erythema, rash or wounds noted. Joint(s) Below: ankle. Tenderness:  none. Swelling: No.              Deformity: no.             ROM: full range of motion. Skin:  no erythema, rash or wounds noted. Neurovascular: Motor deficit: none. Sensory deficit: none. Pulse deficit: none. Capillary refill: normal.   Gait:  With walker  · Integument:  Normal turgor. Warm, dry, without visible rash, unless noted elsewhere. · Lymphatic: no lymphadenopathy noted  · Neurological:  Oriented x3, GCS 15.   Motor functions intact. Lab / Imaging Results   (All laboratory and radiology results have been personally reviewed by myself)  Labs:  No results found for this visit on 10/11/20. Imaging: All Radiology results interpreted by Radiologist unless otherwise noted. XR KNEE LEFT (1-2 VIEWS)   Final Result   No definite radiographic evidence of acute skeletal or hardware pathology. Suggestion of small joint effusion         XR KNEE RIGHT (1-2 VIEWS)   Final Result   Total knee arthropasty without acute hardware complication. No acute fracture or dislocation seen. Nonspecific soft tissue calcification in the suprapatellar region. CT Head WO Contrast   Final Result   No acute intracranial abnormality. ED Course / Medical Decision Making     Medications   Tetanus-Diphth-Acell Pertussis (BOOSTRIX) injection 0.5 mL (0.5 mLs Intramuscular Given 10/11/20 1346)        Re-examination:  10/11/20     Time: 1515-reviewed results with patient. Patient accepts Ace wrap to left knee for small joint effusion. He has his walker at bedside. Instructed patient to use his walker. He states verbal understanding. He states is not anything for pain. He states his orthopedic surgeon is Dr. Tai Tovar. Patients symptoms are improving. Consult(s):   None    Procedure(s):   none    MDM:   Patient presents to the ED for fall causing knee pain and head injury. Differential diagnoses included but not limited to closed head injury versus acute intercranial hemorrhage. Versus fracture versus dislocation versus abrasion versus contusion versus joint effusion. . Workup in the ED revealed x-ray of the left knee revealed no definite definite radiographic evidence of acute skeletal or hardware pathology. Suggestion of a small joint effusion. There was atherosclerotic arterial calcification present this was discussed with patient is incidental finding.   X-ray of the right knee revealed total knee arthroplasty without acute hardware complication. There is no acute fracture dislocation seen. Is nonspecific tissue calcification in the suprapatellar region. T the head without contrast revealed no acute intracranial abnormalities. Patient states he did not anything for pain. Patient continues to be non-toxic on re-evaluation. Findings were discussed with the patient and reasons to immediately return to the ED were articulated to them. They will follow-up with their PMD and orthopedics. Discussed rice technique. Patient has a walker and cane. Discussed using walker. Patient not candidate for crutches due to increased risk of falls. Counseling: The emergency provider has spoken with the patient and discussed todays results, in addition to providing specific details for the plan of care and counseling regarding the diagnosis and prognosis. Questions are answered at this time and they are agreeable with the plan. Assessment      1. Closed head injury, initial encounter    2. Fall, initial encounter    3. Acute pain of both knees    4. Knee effusion, left    5. Contusion of right knee, initial encounter    6. Need for Tdap vaccination      Plan   Discharge to home  Patient condition is stable    New Medications     New Prescriptions    No medications on file     Electronically signed by VIKTORIYA Gleason CNP   DD: 10/11/20  **This report was transcribed using voice recognition software. Every effort was made to ensure accuracy; however, inadvertent computerized transcription errors may be present.   END OF ED PROVIDER NOTE         VIKTORIYA Gleason CNP  10/11/20 9861

## 2020-10-13 ENCOUNTER — OFFICE VISIT (OUTPATIENT)
Dept: ORTHOPEDIC SURGERY | Age: 85
End: 2020-10-13
Payer: MEDICARE

## 2020-10-13 VITALS — TEMPERATURE: 97 F | WEIGHT: 175 LBS | BODY MASS INDEX: 27.47 KG/M2 | HEIGHT: 67 IN

## 2020-10-13 PROCEDURE — 1036F TOBACCO NON-USER: CPT | Performed by: ORTHOPAEDIC SURGERY

## 2020-10-13 PROCEDURE — 1123F ACP DISCUSS/DSCN MKR DOCD: CPT | Performed by: ORTHOPAEDIC SURGERY

## 2020-10-13 PROCEDURE — G8417 CALC BMI ABV UP PARAM F/U: HCPCS | Performed by: ORTHOPAEDIC SURGERY

## 2020-10-13 PROCEDURE — 99202 OFFICE O/P NEW SF 15 MIN: CPT | Performed by: ORTHOPAEDIC SURGERY

## 2020-10-13 PROCEDURE — 4040F PNEUMOC VAC/ADMIN/RCVD: CPT | Performed by: ORTHOPAEDIC SURGERY

## 2020-10-13 PROCEDURE — G8427 DOCREV CUR MEDS BY ELIG CLIN: HCPCS | Performed by: ORTHOPAEDIC SURGERY

## 2020-10-13 PROCEDURE — G8482 FLU IMMUNIZE ORDER/ADMIN: HCPCS | Performed by: ORTHOPAEDIC SURGERY

## 2020-10-13 NOTE — PROGRESS NOTES
Chief Complaint:   Chief Complaint   Patient presents with    Knee Injury     Kimberly Jamison on both knees Friday 10/9/2020. Seen in ER, X-rays done. Hx bilateral TKA's       HPI     Reno Delarosa is a 82 y.o. male, who presents with acute bilateral knee pain anterior to posterior, onset after a ground-level nonsyncopal trip and fall while working in the yard. Mechanism sounds like he landed onto both knees. Had some bruising swelling and stiffness, went to the ED x-rays were done of the knees that are negative for fracture or other injury. Patient now 21 years status post simultaneous bilateral total knee arthroplasties and has continued to do very well up to this point. He has number of other medical issues including diabetic neuropathy which is affecting his balance. As a result of the fall he had difficulty bearing weight but he has now ambulating with a walker and says his pain is improving. Denies mechanical locking or giving way. No other joint complaints. Not on any anticoagulants, taking over-the-counter's on occasion for pain with some relief. Allergies; medications; past medical, surgical, family, and social history; and problem list have been reviewed today and updated as indicated in this encounter - see below following Ortho specifics. Musculoskeletal: Upper extremities are grossly unremarkable intact, leg lengths are equal hip motion is painless bilaterally. Both knees on exam are well aligned, there is some mild ecchymosis at the anteromedial side of the right knee, very mild soft tissue swelling may be a benign mild effusion bilaterally. Patellar tracking is normal full active extension, active flexion almost 120 degrees. The knees are stable to medial lateral and AP stress without crepitus.     Radiologic Studies: X-rays of bilateral knees are reviewed in epic from ED, implants are intact without evidence of loosening migration or wear, patellar nonresurfaced and appear appropriate height. ASSESSMENT/PLAN:    Luis Renteria was seen today for knee injury. Diagnoses and all orders for this visit:    Acute pain of both knees       Impressions contusion bilateral knees, otherwise patient is doing very well, he will do some home exercise emphasizing range of motion otherwise activities to tolerance over the counters as needed for pain follow-up in a couple of weeks repeat exam if pain is not resolved by then. Return in about 2 weeks (around 10/27/2020).        Candance Lesches, MD    10/13/2020  2:33 PM      Patient Active Problem List   Diagnosis    Coronary artery disease involving native coronary artery of native heart without angina pectoris    Dyslipidemia    DM (diabetes mellitus) ([address]    Diastolic dysfunction, left ventricle    JANE (dyspnea on exertion)    Mitral regurgitation    Over weight    Non morbid obesity due to excess calories    H/O total knee replacement    Anemia of chronic disease    Anal fissure    OA (osteoarthritis)    GERD (gastroesophageal reflux disease)    Spinal stenosis, lumbosacral region    Diabetic peripheral neuropathy (HCC)    Low grade myelodysplastic syndrome lesions (HCC)    Hypotension    Type 2 diabetes mellitus with diabetic peripheral angiopathy without gangrene, without long-term current use of insulin (HCC)    Pure hypercholesterolemia    Low back pain    Pain of left lower extremity    Lumbar stenosis    Cardiac enzymes elevated    Postprocedural hypotension    Lower GI bleed    Chest pain    Acute pancreatitis    Hyperlipidemia, mixed       Past Medical History:   Diagnosis Date    Anal fissure 10/27/2016    Anemia     Arthritis     CAD (coronary artery disease)     Diabetes mellitus (HCC)     ADULT ONSET    GERD (gastroesophageal reflux disease) 10/27/2016    Gout     History of blood transfusion     Hyperlipidemia     Low back pain 7/21/2017    Low grade myelodysplastic syndrome lesions ([address] 6/13/2017    OA (osteoarthritis) 10/27/2016    Pain of left lower extremity 7/21/2017    Rectal fissure 1987       Past Surgical History:   Procedure Laterality Date    CHOLECYSTECTOMY, LAPAROSCOPIC  04/10/2018    Dr Edson Garnica  01/16/89    PTCA/PROXIMAL LAD, PTCA MID LAD ([facility])    CORONARY ANGIOPLASTY  04/12/89    REPEAT PTCA TO MID LAD ([facility])    CORONARY ANGIOPLASTY  09/22/11    PTCA/ROTATIONAL ATHERECTOMY & DOUG TO MID LAD, PTCA TO OSTIUM OF DIAGONAL BRANCH THROUGH STENTED SEGMENT IN  LAD.  CORONARY ANGIOPLASTY  10/19/11    PTCA/DOUG TO PROXIMAL RCA (DR. YOUNGER)    CORONARY ANGIOPLASTY WITH STENT PLACEMENT  12/3/15    Dr. Daniel Bo 3.0x15 RCA    DIAGNOSTIC CARDIAC CATH LAB PROCEDURE  01/11/89    @[facility]    DIAGNOSTIC CARDIAC CATH LAB PROCEDURE  01/16/89    @[facility]    DIAGNOSTIC CARDIAC CATH LAB PROCEDURE  09/22/11    ENDOSCOPY, COLON, DIAGNOSTIC      JOINT REPLACEMENT  1999    KNEE    LUMBAR LAMINECTOMY  09/08/2017    lumbar laminectomy  kl4-s1    NM TUMOR LOCALIZATION SPECT MULTI DAY  08/15/11    CTA CORONARIES:  PROXIMAL TO MID LAD STENOSIS, PROXIMAL AND DISTAL RCA STENOSIS, CALCIUM SCORE 2594.  TOTAL KNEE ARTHROPLASTY  10/99    BILATERAL    UPPER GASTROINTESTINAL ENDOSCOPY  09/26/2017    with colonoscopy       Current Outpatient Medications   Medication Sig Dispense Refill    traZODone (DESYREL) 50 MG tablet Take 1 tablet by mouth nightly as needed for Sleep 30 tablet 5    losartan (COZAAR) 25 MG tablet TAKE ONE TABLET BY MOUTH TWO TIMES A  tablet 0    allopurinol (ZYLOPRIM) 300 MG tablet Take 1 tablet by mouth daily 30 tablet 11    glucose monitoring kit (FREESTYLE) monitoring kit 1 kit by Does not apply route daily as needed (as directed) 1 kit 0    blood glucose test strips (ASCENSIA AUTODISC VI;ONE TOUCH ULTRA TEST VI) strip 1 each by In Vitro route daily As needed.  One Touch Ultra 2 Test strips DX: E11.9 100 each 3    metFORMIN (GLUCOPHAGE) 1000 MG tablet Take 1 tablet by mouth 2 times daily (with meals) 180 tablet 3    nateglinide (STARLIX) 120 MG tablet TAKE ONE TABLET BY MOUTH THREE TIMES A DAY BEFORE MEALS 90 tablet 1    isosorbide mononitrate (IMDUR) 30 MG extended release tablet Take 1 tablet by mouth daily 90 tablet 3    Blood Glucose Calibration (OT ULTRA/FASTTK CNTRL SOLN) SOLN 1 Bottle by In Vitro route as needed (as directed) 1 each 3    carvedilol (COREG) 3.125 MG tablet Take 1 tablet by mouth 2 times daily 180 tablet 3    docusate sodium (COLACE) 100 MG capsule Take 1 capsule by mouth daily as needed for Constipation      omeprazole (PRILOSEC) 20 MG delayed release capsule Take 20 mg by mouth daily PRN      nitroGLYCERIN (NITROSTAT) 0.4 MG SL tablet Place 1 tablet under the tongue every 5 minutes as needed for Chest pain 25 tablet 3    Cholecalciferol (VITAMIN D-3) 5000 UNITS TABS Take  by mouth daily.  aspirin 81 MG EC tablet Take 81 mg by mouth daily.  vitamin B-12 (CYANOCOBALAMIN) 1000 MCG tablet Take 1,000 mcg by mouth daily.  folic acid (FOLVITE) 945 MCG tablet Take 400 mcg by mouth daily       pregabalin (LYRICA) 50 MG capsule Take 1 capsule by mouth daily for 180 days. 15 capsule 1     No current facility-administered medications for this visit. Allergies   Allergen Reactions    Iodine Rash     IV Iodine    Benadryl [Diphenhydramine]      IV BENADRYL     Ramipril      cough       Social History     Socioeconomic History    Marital status:      Spouse name: None    Number of children: None    Years of education: None    Highest education level: None   Occupational History    None   Social Needs    Financial resource strain: None    Food insecurity     Worry: None     Inability: None    Transportation needs     Medical: None     Non-medical: None   Tobacco Use    Smoking status: Never Smoker    Smokeless tobacco: Never Used   Substance and Sexual Activity    Alcohol use:  Yes     Alcohol/week: 0.0 standard drinks     Comment: rare mixed drink     Drug use: No    Sexual activity: Not Currently     Partners: Female   Lifestyle    Physical activity     Days per week: None     Minutes per session: None    Stress: None   Relationships    Social connections     Talks on phone: None     Gets together: None     Attends [employer] service: None     Active member of club or organization: None     Attends meetings of clubs or organizations: None     Relationship status: None    Intimate partner violence     Fear of current or ex partner: None     Emotionally abused: None     Physically abused: None     Forced sexual activity: None   Other Topics Concern    None   Social History Narrative    None       Family History   Problem Relation Age of Onset    Heart Disease Brother     Diabetes Brother     Parkinsonism Brother          Review of Systems  As follows except as previously noted in HPI:  Constitutional: Negative for chills, diaphoresis, fatigue, fever and unexpected weight change. Respiratory: Negative for cough, shortness of breath and wheezing. Cardiovascular: Negative for chest pain and palpitations. Neurological: Negative for dizziness, syncope, cephalgia. GI / : negative  Musculoskeletal: see HPI       Objective:   Physical Exam   Constitutional: Oriented to person, place, and time. and appears well-developed and well-nourished. :   Head: Normocephalic and atraumatic. Eyes: EOM are normal.   Neck: Neck supple. Cardiovascular: Normal rate and regular rhythm. Pulmonary/Chest: Effort normal. No stridor. No respiratory distress, no wheezes. Abdominal:  No abnormal distension. Neurological: Alert and oriented to person, place, and time. Skin: Skin is warm and dry. Psychiatric: Normal mood and affect.  Behavior is normal. Thought content normal.

## 2020-10-27 ENCOUNTER — OFFICE VISIT (OUTPATIENT)
Dept: ORTHOPEDIC SURGERY | Age: 85
End: 2020-10-27
Payer: MEDICARE

## 2020-10-27 VITALS — WEIGHT: 175 LBS | TEMPERATURE: 96.7 F | HEIGHT: 67 IN | BODY MASS INDEX: 27.47 KG/M2

## 2020-10-27 PROCEDURE — G8427 DOCREV CUR MEDS BY ELIG CLIN: HCPCS | Performed by: ORTHOPAEDIC SURGERY

## 2020-10-27 PROCEDURE — 4040F PNEUMOC VAC/ADMIN/RCVD: CPT | Performed by: ORTHOPAEDIC SURGERY

## 2020-10-27 PROCEDURE — 99212 OFFICE O/P EST SF 10 MIN: CPT | Performed by: ORTHOPAEDIC SURGERY

## 2020-10-27 PROCEDURE — G8482 FLU IMMUNIZE ORDER/ADMIN: HCPCS | Performed by: ORTHOPAEDIC SURGERY

## 2020-10-27 PROCEDURE — 1123F ACP DISCUSS/DSCN MKR DOCD: CPT | Performed by: ORTHOPAEDIC SURGERY

## 2020-10-27 PROCEDURE — 1036F TOBACCO NON-USER: CPT | Performed by: ORTHOPAEDIC SURGERY

## 2020-10-27 PROCEDURE — G8417 CALC BMI ABV UP PARAM F/U: HCPCS | Performed by: ORTHOPAEDIC SURGERY

## 2020-10-27 RX ORDER — ATORVASTATIN CALCIUM 80 MG/1
80 TABLET, FILM COATED ORAL DAILY
COMMUNITY
Start: 2020-10-25

## 2020-10-27 NOTE — PROGRESS NOTES
Chief Complaint:   Chief Complaint   Patient presents with    Knee Pain     FU Bilateral knee pain. States pain has improved       Mili East presents for follow-up of bilateral knee pain, he says he is feeling much better, able to do home exercise now ambulating with a single cane out of doors, right knee virtually symptomatic pain on the left is posterior only, no anterior pain, no mechanical locking giving way. Allergies; medications; past medical, surgical, family, and social history; and problem list have been reviewed today and updated as indicated in this encounter seen below. Exam: Leg lengths equal hip motion painless, right knee absolutely benign, left total knee remains well aligned no effusion or synovitis, no crepitus through range of motion 0 to almost 120 degrees with excellent medial lateral and AP stability to stress, calf soft nontender hamstrings distally mildly tender but intact. Radiographs: Deferred benign clinical exam.    Josey Strong was seen today for knee pain. Diagnoses and all orders for this visit:    Acute pain of both knees       Impression is sprain strain left total knee, doing well at this point with expectant observation and home exercise, continue activity to tolerance with use of assistive aid to prevent further injury as the patient does have history of peripheral neuropathy and balance difficulties. Questions asked and answered follow-up here as needed. - Counseling More Than 50% of the Appointment Time: 10 minutes    Return if symptoms worsen or fail to improve.      Current Outpatient Medications   Medication Sig Dispense Refill    atorvastatin (LIPITOR) 80 MG tablet Take 80 mg by mouth daily       traZODone (DESYREL) 50 MG tablet Take 1 tablet by mouth nightly as needed for Sleep 30 tablet 5    losartan (COZAAR) 25 MG tablet TAKE ONE TABLET BY MOUTH TWO TIMES A  tablet 0    allopurinol (ZYLOPRIM) 300 MG tablet Take 1 tablet by mouth daily 30 tablet 11    glucose monitoring kit (FREESTYLE) monitoring kit 1 kit by Does not apply route daily as needed (as directed) 1 kit 0    blood glucose test strips (ASCENSIA AUTODISC VI;ONE TOUCH ULTRA TEST VI) strip 1 each by In Vitro route daily As needed. One Touch Ultra 2 Test strips DX: E11.9 100 each 3    metFORMIN (GLUCOPHAGE) 1000 MG tablet Take 1 tablet by mouth 2 times daily (with meals) 180 tablet 3    nateglinide (STARLIX) 120 MG tablet TAKE ONE TABLET BY MOUTH THREE TIMES A DAY BEFORE MEALS 90 tablet 1    isosorbide mononitrate (IMDUR) 30 MG extended release tablet Take 1 tablet by mouth daily 90 tablet 3    Blood Glucose Calibration (OT ULTRA/FASTTK CNTRL SOLN) SOLN 1 Bottle by In Vitro route as needed (as directed) 1 each 3    carvedilol (COREG) 3.125 MG tablet Take 1 tablet by mouth 2 times daily 180 tablet 3    docusate sodium (COLACE) 100 MG capsule Take 1 capsule by mouth daily as needed for Constipation      omeprazole (PRILOSEC) 20 MG delayed release capsule Take 20 mg by mouth daily PRN      nitroGLYCERIN (NITROSTAT) 0.4 MG SL tablet Place 1 tablet under the tongue every 5 minutes as needed for Chest pain 25 tablet 3    Cholecalciferol (VITAMIN D-3) 5000 UNITS TABS Take  by mouth daily.  aspirin 81 MG EC tablet Take 81 mg by mouth daily.  vitamin B-12 (CYANOCOBALAMIN) 1000 MCG tablet Take 1,000 mcg by mouth daily.  folic acid (FOLVITE) 073 MCG tablet Take 400 mcg by mouth daily       pregabalin (LYRICA) 50 MG capsule Take 1 capsule by mouth daily for 180 days. 15 capsule 1     No current facility-administered medications for this visit.         Patient Active Problem List   Diagnosis    Coronary artery disease involving native coronary artery of native heart without angina pectoris    Dyslipidemia    DM (diabetes mellitus) ([facility] [address]    Diastolic dysfunction, left ventricle    JANE (dyspnea on exertion)    Mitral regurgitation    Over weight    Non morbid obesity due to follows except as previously noted in HPI:  Constitutional: Negative for chills, diaphoresis, fatigue, fever and unexpected weight change. Respiratory: Negative for cough, shortness of breath and wheezing. Cardiovascular: Negative for chest pain and palpitations. Neurological: Negative for dizziness, syncope, cephalgia. GI / : negative  Musculoskeletal: see HPI       Objective:   Physical Exam   Constitutional: Oriented to person, place, and time. and appears well-developed and well-nourished. :   Head: Normocephalic and atraumatic. Eyes: EOM are normal.   Neck: Neck supple. Cardiovascular: Normal rate and regular rhythm. Pulmonary/Chest: Effort normal. No stridor. No respiratory distress, no wheezes. Abdominal:  No abnormal distension. Neurological: Alert and oriented to person, place, and time. Skin: Skin is warm and dry. Psychiatric: Normal mood and affect.  Behavior is normal. Thought content normal.    10/27/2020  4:15 PM

## 2021-02-17 RX ORDER — CARVEDILOL 3.12 MG/1
3.12 TABLET ORAL 2 TIMES DAILY
Qty: 180 TABLET | Refills: 3 | OUTPATIENT
Start: 2021-02-17 | End: 2022-01-10

## 2021-03-01 DIAGNOSIS — D46.20 LOW GRADE MYELODYSPLASTIC SYNDROME LESIONS (HCC): ICD-10-CM

## 2021-03-01 DIAGNOSIS — E78.01 FAMILIAL HYPERCHOLESTEROLEMIA: ICD-10-CM

## 2021-03-01 DIAGNOSIS — E11.51 TYPE II DIABETES MELLITUS WITH PERIPHERAL CIRCULATORY DISORDER (HCC): ICD-10-CM

## 2021-03-01 LAB
ALBUMIN SERPL-MCNC: 4.2 G/DL (ref 3.5–5.2)
ALP BLD-CCNC: 41 U/L (ref 40–129)
ALT SERPL-CCNC: 13 U/L (ref 0–40)
ANION GAP SERPL CALCULATED.3IONS-SCNC: 11 MMOL/L (ref 7–16)
AST SERPL-CCNC: 39 U/L (ref 0–39)
BASOPHILS ABSOLUTE: 0.09 E9/L (ref 0–0.2)
BASOPHILS RELATIVE PERCENT: 1.1 % (ref 0–2)
BILIRUB SERPL-MCNC: 0.3 MG/DL (ref 0–1.2)
BUN BLDV-MCNC: 20 MG/DL (ref 8–23)
CALCIUM SERPL-MCNC: 9.3 MG/DL (ref 8.6–10.2)
CHLORIDE BLD-SCNC: 104 MMOL/L (ref 98–107)
CHOLESTEROL, TOTAL: 138 MG/DL (ref 0–199)
CO2: 23 MMOL/L (ref 22–29)
CREAT SERPL-MCNC: 1.3 MG/DL (ref 0.7–1.2)
EOSINOPHILS ABSOLUTE: 0.14 E9/L (ref 0.05–0.5)
EOSINOPHILS RELATIVE PERCENT: 1.7 % (ref 0–6)
GFR AFRICAN AMERICAN: >60
GFR NON-AFRICAN AMERICAN: 53 ML/MIN/1.73
GLUCOSE BLD-MCNC: 104 MG/DL (ref 74–99)
HBA1C MFR BLD: 6 % (ref 4–5.6)
HCT VFR BLD CALC: 36.2 % (ref 37–54)
HDLC SERPL-MCNC: 30 MG/DL
HEMOGLOBIN: 11.2 G/DL (ref 12.5–16.5)
IMMATURE GRANULOCYTES #: 0.1 E9/L
IMMATURE GRANULOCYTES %: 1.2 % (ref 0–5)
LDL CHOLESTEROL CALCULATED: 92 MG/DL (ref 0–99)
LYMPHOCYTES ABSOLUTE: 1.93 E9/L (ref 1.5–4)
LYMPHOCYTES RELATIVE PERCENT: 22.9 % (ref 20–42)
MCH RBC QN AUTO: 27.3 PG (ref 26–35)
MCHC RBC AUTO-ENTMCNC: 30.9 % (ref 32–34.5)
MCV RBC AUTO: 88.1 FL (ref 80–99.9)
MONOCYTES ABSOLUTE: 1.44 E9/L (ref 0.1–0.95)
MONOCYTES RELATIVE PERCENT: 17.1 % (ref 2–12)
NEUTROPHILS ABSOLUTE: 4.71 E9/L (ref 1.8–7.3)
NEUTROPHILS RELATIVE PERCENT: 56 % (ref 43–80)
PDW BLD-RTO: 14.6 FL (ref 11.5–15)
PLATELET # BLD: 488 E9/L (ref 130–450)
PMV BLD AUTO: 12.4 FL (ref 7–12)
POTASSIUM SERPL-SCNC: 5.6 MMOL/L (ref 3.5–5)
RBC # BLD: 4.11 E12/L (ref 3.8–5.8)
SODIUM BLD-SCNC: 138 MMOL/L (ref 132–146)
TOTAL PROTEIN: 8.5 G/DL (ref 6.4–8.3)
TRIGL SERPL-MCNC: 79 MG/DL (ref 0–149)
VLDLC SERPL CALC-MCNC: 16 MG/DL
WBC # BLD: 8.4 E9/L (ref 4.5–11.5)

## 2021-03-10 DIAGNOSIS — E87.5 SERUM POTASSIUM ELEVATED: ICD-10-CM

## 2021-03-10 LAB
ANION GAP SERPL CALCULATED.3IONS-SCNC: 17 MMOL/L (ref 7–16)
BUN BLDV-MCNC: 26 MG/DL (ref 8–23)
CALCIUM SERPL-MCNC: 9.4 MG/DL (ref 8.6–10.2)
CHLORIDE BLD-SCNC: 101 MMOL/L (ref 98–107)
CO2: 19 MMOL/L (ref 22–29)
CREAT SERPL-MCNC: 1.4 MG/DL (ref 0.7–1.2)
GFR AFRICAN AMERICAN: 58
GFR NON-AFRICAN AMERICAN: 48 ML/MIN/1.73
GLUCOSE BLD-MCNC: 143 MG/DL (ref 74–99)
POTASSIUM SERPL-SCNC: 5.5 MMOL/L (ref 3.5–5)
SODIUM BLD-SCNC: 137 MMOL/L (ref 132–146)

## 2021-03-19 DIAGNOSIS — E87.5 SERUM POTASSIUM ELEVATED: ICD-10-CM

## 2021-03-19 LAB
ANION GAP SERPL CALCULATED.3IONS-SCNC: 11 MMOL/L (ref 7–16)
BUN BLDV-MCNC: 17 MG/DL (ref 8–23)
CALCIUM SERPL-MCNC: 9.4 MG/DL (ref 8.6–10.2)
CHLORIDE BLD-SCNC: 101 MMOL/L (ref 98–107)
CO2: 25 MMOL/L (ref 22–29)
CREAT SERPL-MCNC: 1.2 MG/DL (ref 0.7–1.2)
GFR AFRICAN AMERICAN: >60
GFR NON-AFRICAN AMERICAN: 58 ML/MIN/1.73
GLUCOSE BLD-MCNC: 118 MG/DL (ref 74–99)
POTASSIUM SERPL-SCNC: 4.8 MMOL/L (ref 3.5–5)
SODIUM BLD-SCNC: 137 MMOL/L (ref 132–146)

## 2021-04-12 ENCOUNTER — OFFICE VISIT (OUTPATIENT)
Dept: CARDIOLOGY CLINIC | Age: 86
End: 2021-04-12
Payer: MEDICARE

## 2021-04-12 VITALS
SYSTOLIC BLOOD PRESSURE: 122 MMHG | BODY MASS INDEX: 28.48 KG/M2 | WEIGHT: 177.2 LBS | HEIGHT: 66 IN | DIASTOLIC BLOOD PRESSURE: 70 MMHG | HEART RATE: 75 BPM | OXYGEN SATURATION: 97 % | RESPIRATION RATE: 20 BRPM

## 2021-04-12 DIAGNOSIS — F41.9 ANXIETY: ICD-10-CM

## 2021-04-12 DIAGNOSIS — I10 ESSENTIAL HYPERTENSION: ICD-10-CM

## 2021-04-12 DIAGNOSIS — I25.10 CORONARY ARTERY DISEASE INVOLVING NATIVE CORONARY ARTERY OF NATIVE HEART WITHOUT ANGINA PECTORIS: Primary | ICD-10-CM

## 2021-04-12 DIAGNOSIS — R06.09 DOE (DYSPNEA ON EXERTION): ICD-10-CM

## 2021-04-12 DIAGNOSIS — G47.00 INSOMNIA, UNSPECIFIED TYPE: ICD-10-CM

## 2021-04-12 PROCEDURE — 1036F TOBACCO NON-USER: CPT | Performed by: INTERNAL MEDICINE

## 2021-04-12 PROCEDURE — 93000 ELECTROCARDIOGRAM COMPLETE: CPT | Performed by: INTERNAL MEDICINE

## 2021-04-12 PROCEDURE — G8427 DOCREV CUR MEDS BY ELIG CLIN: HCPCS | Performed by: INTERNAL MEDICINE

## 2021-04-12 PROCEDURE — 1123F ACP DISCUSS/DSCN MKR DOCD: CPT | Performed by: INTERNAL MEDICINE

## 2021-04-12 PROCEDURE — G8417 CALC BMI ABV UP PARAM F/U: HCPCS | Performed by: INTERNAL MEDICINE

## 2021-04-12 PROCEDURE — 99213 OFFICE O/P EST LOW 20 MIN: CPT | Performed by: INTERNAL MEDICINE

## 2021-04-12 PROCEDURE — 4040F PNEUMOC VAC/ADMIN/RCVD: CPT | Performed by: INTERNAL MEDICINE

## 2021-04-12 RX ORDER — ALLOPURINOL 300 MG/1
300 TABLET ORAL DAILY
Qty: 30 TABLET | Refills: 11 | Status: SHIPPED
Start: 2021-04-12 | End: 2022-04-11

## 2021-04-12 RX ORDER — EZETIMIBE 10 MG/1
10 TABLET ORAL DAILY
Qty: 30 TABLET | Refills: 6 | Status: SHIPPED | OUTPATIENT
Start: 2021-04-12

## 2021-04-12 RX ORDER — LORAZEPAM 1 MG/1
1 TABLET ORAL DAILY PRN
Qty: 15 TABLET | Refills: 3 | Status: SHIPPED | OUTPATIENT
Start: 2021-04-12 | End: 2021-12-23 | Stop reason: SDUPTHER

## 2021-04-12 SDOH — HEALTH STABILITY: MENTAL HEALTH: HOW OFTEN DO YOU HAVE A DRINK CONTAINING ALCOHOL?: MONTHLY OR LESS

## 2021-04-12 NOTE — PROGRESS NOTES
OFFICE VISIT     PRIMARY CARE PHYSICIAN:      Mirta Daniel MD       ALLERGIES / SENSITIVITIES:        Allergies   Allergen Reactions    Iodine Rash     IV Iodine    Benadryl [Diphenhydramine]      IV BENADRYL     Ramipril      cough          REVIEWED MEDICATIONS:        Current Outpatient Medications:     allopurinol (ZYLOPRIM) 300 MG tablet, Take 1 tablet by mouth daily, Disp: 30 tablet, Rfl: 11    LORazepam (ATIVAN) 1 MG tablet, Take 1 tablet by mouth daily as needed for Anxiety (For anxiety and insomnia) for up to 360 days. , Disp: 15 tablet, Rfl: 3    ezetimibe (ZETIA) 10 MG tablet, Take 1 tablet by mouth daily, Disp: 30 tablet, Rfl: 6    sodium polystyrene (SPS) 15 GM/60ML suspension, Take 60 mLs by mouth three times a week, Disp: 720 mL, Rfl: 3    metFORMIN (GLUCOPHAGE) 1000 MG tablet, Take 1 tablet by mouth 2 times daily (with meals), Disp: 180 tablet, Rfl: 3    carvedilol (COREG) 3.125 MG tablet, Take 1 tablet by mouth 2 times daily, Disp: 180 tablet, Rfl: 3    nateglinide (STARLIX) 120 MG tablet, TAKE ONE TABLET BY MOUTH THREE TIMES A DAY BEFORE MEALS, Disp: 270 tablet, Rfl: 1    isosorbide mononitrate (IMDUR) 30 MG extended release tablet, Take 1 tablet by mouth daily, Disp: 90 tablet, Rfl: 3    docusate sodium (COLACE) 100 MG capsule, Take 1 capsule by mouth daily as needed for Constipation, Disp: , Rfl:     omeprazole (PRILOSEC) 20 MG delayed release capsule, Take 20 mg by mouth daily PRN, Disp: , Rfl:     nitroGLYCERIN (NITROSTAT) 0.4 MG SL tablet, Place 1 tablet under the tongue every 5 minutes as needed for Chest pain, Disp: 25 tablet, Rfl: 3    Cholecalciferol (VITAMIN D-3) 5000 UNITS TABS, Take  by mouth daily. , Disp: , Rfl:     aspirin 81 MG EC tablet, Take 81 mg by mouth daily. , Disp: , Rfl:     vitamin B-12 (CYANOCOBALAMIN) 1000 MCG tablet, Take 1,000 mcg by mouth daily.   , Disp: , Rfl:     folic acid (FOLVITE) 984 MCG tablet, Take 400 mcg by mouth daily , Disp: , Rfl:    atorvastatin (LIPITOR) 80 MG tablet, Take 80 mg by mouth daily , Disp: , Rfl:     traZODone (DESYREL) 50 MG tablet, Take 1 tablet by mouth nightly as needed for Sleep (Patient not taking: Reported on 4/12/2021), Disp: 30 tablet, Rfl: 5    glucose monitoring kit (FREESTYLE) monitoring kit, 1 kit by Does not apply route daily as needed (as directed), Disp: 1 kit, Rfl: 0    blood glucose test strips (ASCENSIA AUTODISC VI;ONE TOUCH ULTRA TEST VI) strip, 1 each by In Vitro route daily As needed. One Touch Ultra 2 Test strips DX: E11.9, Disp: 100 each, Rfl: 3    pregabalin (LYRICA) 50 MG capsule, Take 1 capsule by mouth daily for 180 days. (Patient not taking: Reported on 4/12/2021), Disp: 15 capsule, Rfl: 1    Blood Glucose Calibration (OT ULTRA/FASTTK CNTRL SOLN) SOLN, 1 Bottle by In Vitro route as needed (as directed), Disp: 1 each, Rfl: 3      S: REASON FOR VISIT:       Chief Complaint   Patient presents with    Coronary Artery Disease     2&1/2 year ov. To ED 10/11/20 d/t fall/bilat knee pain/head inj. Last labs 3/21. Is c/o increased SOBOE. No other cardiac complaints. 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 Anxiety. Seen in ER after fall in Oct 2020, no syncope    C/o mild SOBOE, chronic, no worse, No Orthopnea   No hospitalizations or surgeries since last visit   Patient is compliant with all medications   Emile any exertional chest pain    No palpitations, dizzy or syncope.    Active at home   Try to watch diet          Past Medical History:   Diagnosis Date    Anal fissure 10/27/2016    Anemia     Arthritis     CAD (coronary artery disease)     Diabetes mellitus ([address]     ADULT ONSET    GERD (gastroesophageal reflux disease) 10/27/2016    Gout     History of blood transfusion     Hyperkalemia     Hyperlipidemia     Low back pain 07/21/2017    Low grade myelodysplastic syndrome lesions (HCC) 06/13/2017    OA (osteoarthritis) 10/27/2016    Pain of left lower extremity 07/21/2017    Rectal fissure 1987            Past Surgical History:   Procedure Laterality Date    CHOLECYSTECTOMY, LAPAROSCOPIC  04/10/2018    Dr Shiv Hernandez  01/16/89    PTCA/PROXIMAL LAD, PTCA MID LAD ([facility])    CORONARY ANGIOPLASTY  04/12/89    REPEAT PTCA TO MID LAD ([facility])    CORONARY ANGIOPLASTY  09/22/11    PTCA/ROTATIONAL ATHERECTOMY & DOUG TO MID LAD, PTCA TO OSTIUM OF DIAGONAL BRANCH THROUGH STENTED SEGMENT IN  LAD.  CORONARY ANGIOPLASTY  10/19/11    PTCA/DOUG TO PROXIMAL RCA (DR. YOUNGER)    CORONARY ANGIOPLASTY WITH STENT PLACEMENT  12/3/15    Dr. Lerry Hamman 3.0x15 RCA    DIAGNOSTIC CARDIAC CATH LAB PROCEDURE  01/11/89    @[facility]    DIAGNOSTIC CARDIAC CATH LAB PROCEDURE  01/16/89    @[facility]    DIAGNOSTIC CARDIAC CATH LAB PROCEDURE  09/22/11    ENDOSCOPY, COLON, DIAGNOSTIC      JOINT REPLACEMENT  1999    KNEE    LUMBAR LAMINECTOMY  09/08/2017    lumbar laminectomy  [id number]    NM TUMOR LOCALIZATION SPECT MULTI DAY  08/15/11    CTA CORONARIES:  PROXIMAL TO MID LAD STENOSIS, PROXIMAL AND DISTAL RCA STENOSIS, CALCIUM SCORE 2594.     TOTAL KNEE ARTHROPLASTY  10/99    BILATERAL    UPPER GASTROINTESTINAL ENDOSCOPY  09/26/2017    with colonoscopy          Family History   Problem Relation Age of Onset    Heart Disease Brother     Diabetes Brother     Parkinsonism Brother           Social History     Tobacco Use    Smoking status: Never Smoker    Smokeless tobacco: Never Used   Substance Use Topics    Alcohol use: Yes     Frequency: Monthly or less     Drinks per session: 1 or 2     Binge frequency: Never     Comment: rare mixed drink          Review of Systems:  Constitutional: negative for fever and chills, or significant weight loss, +difficulty in sleeping at night  HEENT: negative for acute visual symptoms or coronaries without angina; On Aspirin, Coreg, Altace, and Statin. Stable    - S/p PTCA of Proximal and Mid LAD in Jan 1989 at [facility].    - S/p Repeat PTCA of LAD in April 1989 at [facility] [place].  - S/p Rotational atherectomy and 2.25-mm/16-mm Scimed Ion DOUG to Mid LAD, Lengthy procedure and also PTCA of ostial Diagonal branch through LAD stent - Dr Mica Cabrales at [place] [address] on 9/22/2011  - S/p 3.0-mm/28-mm Xience Abbott DOUG to proximal RCA.  Also there is 50% Mid RCA, 50% distal RCA, Prox ISA 20% stenosis, Small caliber superior branch of PDA ostial 70%, larger branch of PDA ostial 50% stenosis - Dr Mica Cabrales at [facility] on 10/9/2011.  - Coronary CTA 7/20//2015 due to [facility] equivalent; showed high grade RCA stenosis; Sub-optimal study due to calcified coronaries.  - S/p 3.5-mm x 18-mm Alpine-RX Abbott DOUG to distal RCA. Also there is Prox LAD 50%; Mild LAD two areas-mid and distal edge in-stent restenosis, 70% OM1 stenosis; calcified coronaries, Dr Mica Cabrales, at [place] [address] on 12/3/2015.          Hx of IVP contrast-Iodine allergy, He had no issues with premedicating (Steroids, Benadryl +/- H2 blockers) prior to contrast procedures.     PAD (peripheral artery disease) ([address] Absent DP pulses on both feet; Had doppler studies done in 9735     LV diastolic dysfunction, stable; EF 60-65% by cardiac cath in 12/2015 and Echo in 9/2017.     JANE (dyspnea on exertion), chronic, unchanged - Multifactorial - no acute CHF      Mitral regurgitation, Mild, by 9/2017 Echo       Obesity, non-morbid: Diet, exercise and weight loss discussed.      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 Insomnia, unspecified type  -     LORazepam (ATIVAN) 1 MG tablet; Take 1 tablet by mouth daily as needed for Anxiety (For anxiety and insomnia) for up to 360 days. Preventive Cardiology: Low cholesterol diet, regular exercise as tolerate, and gradual weight loss discussed. Other orders  -     allopurinol (ZYLOPRIM) 300 MG tablet; Take 1 tablet by mouth daily  -     ezetimibe (ZETIA) 10 MG tablet; Take 1 tablet by mouth daily       Monitor BP and heart rates. Above recommendations discussed with him   All questions answered about cardiac diagnoses and cardiac medications. Continue current medications. Compliance with medications and f/u with all physicians discussed. Risk factor modification based on risk profile discussed. Call if any exertional chest pain, short of breath, dizzy or palpitations   Follow up in 6 months or earlier if needed.          [facility]  [address], [place], [address] [facility]  [phone]

## 2021-05-10 RX ORDER — ISOSORBIDE MONONITRATE 30 MG/1
30 TABLET, EXTENDED RELEASE ORAL DAILY
Qty: 90 TABLET | Refills: 3 | Status: SHIPPED
Start: 2021-05-10 | End: 2022-04-29

## 2021-09-28 ENCOUNTER — HOSPITAL ENCOUNTER (OUTPATIENT)
Age: 86
Discharge: HOME | End: 2021-09-28
Payer: MEDICARE

## 2021-09-28 LAB
ANION GAP SERPL CALCULATED.3IONS-SCNC: 10 MMOL/L (ref 7–16)
BUN BLDV-MCNC: 24 MG/DL (ref 6–23)
CALCIUM SERPL-MCNC: 9.4 MG/DL (ref 8.6–10.2)
CHLORIDE BLD-SCNC: 104 MMOL/L (ref 98–107)
CO2: 22 MMOL/L (ref 22–29)
CREAT SERPL-MCNC: 1.3 MG/DL (ref 0.7–1.2)
GFR AFRICAN AMERICAN: >60
GFR NON-AFRICAN AMERICAN: 53 ML/MIN/1.73
GLUCOSE BLD-MCNC: 140 MG/DL (ref 74–99)
MAGNESIUM: 1.8 MG/DL (ref 1.6–2.6)
POTASSIUM SERPL-SCNC: 4.5 MMOL/L (ref 3.5–5)
SODIUM BLD-SCNC: 136 MMOL/L (ref 132–146)

## 2021-09-28 PROCEDURE — 80048 BASIC METABOLIC PNL TOTAL CA: CPT

## 2021-09-28 PROCEDURE — 36415 COLL VENOUS BLD VENIPUNCTURE: CPT

## 2021-09-28 PROCEDURE — 83735 ASSAY OF MAGNESIUM: CPT

## 2021-10-28 ENCOUNTER — HOSPITAL ENCOUNTER (OUTPATIENT)
Age: 86
Discharge: HOME | End: 2021-10-28
Payer: MEDICARE

## 2021-10-28 LAB
ALBUMIN SERPL-MCNC: 4.3 G/DL (ref 3.5–5.2)
ALP BLD-CCNC: 47 U/L (ref 40–129)
ALT SERPL-CCNC: 12 U/L (ref 0–40)
ANION GAP SERPL CALCULATED.3IONS-SCNC: 10 MMOL/L (ref 7–16)
AST SERPL-CCNC: 18 U/L (ref 0–39)
BILIRUB SERPL-MCNC: 0.2 MG/DL (ref 0–1.2)
BUN BLDV-MCNC: 23 MG/DL (ref 6–23)
CALCIUM SERPL-MCNC: 9.2 MG/DL (ref 8.6–10.2)
CHLORIDE BLD-SCNC: 100 MMOL/L (ref 98–107)
CO2: 20 MMOL/L (ref 22–29)
CREAT SERPL-MCNC: 1.3 MG/DL (ref 0.7–1.2)
CREATININE URINE: 35 MG/DL (ref 40–278)
CREATININE URINE: 36 MG/DL (ref 40–278)
GFR AFRICAN AMERICAN: >60
GFR NON-AFRICAN AMERICAN: 53 ML/MIN/1.73
GLUCOSE BLD-MCNC: 132 MG/DL (ref 74–99)
HCT VFR BLD CALC: 33.9 % (ref 37–54)
HEMOGLOBIN: 10.4 G/DL (ref 12.5–16.5)
MCH RBC QN AUTO: 27 PG (ref 26–35)
MCHC RBC AUTO-ENTMCNC: 30.7 % (ref 32–34.5)
MCV RBC AUTO: 88.1 FL (ref 80–99.9)
MICROALBUMIN UR-MCNC: <12 MG/L
MICROALBUMIN/CREAT UR-RTO: ABNORMAL (ref 0–30)
PARATHYROID HORMONE INTACT: 22 PG/ML (ref 15–65)
PDW BLD-RTO: 15.7 FL (ref 11.5–15)
PHOSPHORUS: 3.7 MG/DL (ref 2.5–4.5)
PLATELET # BLD: 385 E9/L (ref 130–450)
PMV BLD AUTO: 12.8 FL (ref 7–12)
POTASSIUM SERPL-SCNC: 4.6 MMOL/L (ref 3.5–5)
PROTEIN PROTEIN: <4 MG/DL (ref 0–12)
PROTEIN/CREAT RATIO: 0.1
PROTEIN/CREAT RATIO: 0.1 (ref 0–0.2)
RBC # BLD: 3.85 E12/L (ref 3.8–5.8)
SODIUM BLD-SCNC: 130 MMOL/L (ref 132–146)
TOTAL PROTEIN: 8.7 G/DL (ref 6.4–8.3)
VITAMIN D 25-HYDROXY: 23 NG/ML (ref 30–100)
WBC # BLD: 10.1 E9/L (ref 4.5–11.5)

## 2021-10-28 PROCEDURE — 82306 VITAMIN D 25 HYDROXY: CPT

## 2021-10-28 PROCEDURE — 84100 ASSAY OF PHOSPHORUS: CPT

## 2021-10-28 PROCEDURE — 82044 UR ALBUMIN SEMIQUANTITATIVE: CPT

## 2021-10-28 PROCEDURE — 80053 COMPREHEN METABOLIC PANEL: CPT

## 2021-10-28 PROCEDURE — 83970 ASSAY OF PARATHORMONE: CPT

## 2021-10-28 PROCEDURE — 85027 COMPLETE CBC AUTOMATED: CPT

## 2021-10-28 PROCEDURE — 84156 ASSAY OF PROTEIN URINE: CPT

## 2021-10-28 PROCEDURE — 82570 ASSAY OF URINE CREATININE: CPT

## 2021-10-28 PROCEDURE — 36415 COLL VENOUS BLD VENIPUNCTURE: CPT

## 2021-12-16 DIAGNOSIS — E78.01 FAMILIAL HYPERCHOLESTEROLEMIA: ICD-10-CM

## 2021-12-16 DIAGNOSIS — E11.51 TYPE II DIABETES MELLITUS WITH PERIPHERAL CIRCULATORY DISORDER (HCC): ICD-10-CM

## 2021-12-16 LAB
ACANTHOCYTES: ABNORMAL
ALBUMIN SERPL-MCNC: 4.4 G/DL (ref 3.5–5.2)
ALP BLD-CCNC: 42 U/L (ref 40–129)
ALT SERPL-CCNC: 16 U/L (ref 0–40)
ANION GAP SERPL CALCULATED.3IONS-SCNC: 12 MMOL/L (ref 7–16)
ANISOCYTOSIS: ABNORMAL
AST SERPL-CCNC: 27 U/L (ref 0–39)
BASOPHILS ABSOLUTE: 0.29 E9/L (ref 0–0.2)
BASOPHILS RELATIVE PERCENT: 2.6 % (ref 0–2)
BILIRUB SERPL-MCNC: 0.3 MG/DL (ref 0–1.2)
BUN BLDV-MCNC: 19 MG/DL (ref 6–23)
CALCIUM SERPL-MCNC: 9.4 MG/DL (ref 8.6–10.2)
CHLORIDE BLD-SCNC: 104 MMOL/L (ref 98–107)
CHOLESTEROL, TOTAL: 124 MG/DL (ref 0–199)
CO2: 21 MMOL/L (ref 22–29)
CREAT SERPL-MCNC: 1.3 MG/DL (ref 0.7–1.2)
EOSINOPHILS ABSOLUTE: 0.29 E9/L (ref 0.05–0.5)
EOSINOPHILS RELATIVE PERCENT: 2.6 % (ref 0–6)
GFR AFRICAN AMERICAN: >60
GFR NON-AFRICAN AMERICAN: 53 ML/MIN/1.73
GLUCOSE BLD-MCNC: 100 MG/DL (ref 74–99)
HBA1C MFR BLD: 5.9 % (ref 4–5.6)
HCT VFR BLD CALC: 35.7 % (ref 37–54)
HDLC SERPL-MCNC: 34 MG/DL
HEMOGLOBIN: 10.9 G/DL (ref 12.5–16.5)
LDL CHOLESTEROL CALCULATED: 75 MG/DL (ref 0–99)
LYMPHOCYTES ABSOLUTE: 1.81 E9/L (ref 1.5–4)
LYMPHOCYTES RELATIVE PERCENT: 15.6 % (ref 20–42)
MCH RBC QN AUTO: 26.5 PG (ref 26–35)
MCHC RBC AUTO-ENTMCNC: 30.5 % (ref 32–34.5)
MCV RBC AUTO: 86.9 FL (ref 80–99.9)
MONOCYTES ABSOLUTE: 2.03 E9/L (ref 0.1–0.95)
MONOCYTES RELATIVE PERCENT: 18.3 % (ref 2–12)
NEUTROPHILS ABSOLUTE: 6.89 E9/L (ref 1.8–7.3)
NEUTROPHILS RELATIVE PERCENT: 60.9 % (ref 43–80)
PDW BLD-RTO: 16.4 FL (ref 11.5–15)
PLATELET # BLD: 372 E9/L (ref 130–450)
PMV BLD AUTO: 12.7 FL (ref 7–12)
POIKILOCYTES: ABNORMAL
POLYCHROMASIA: ABNORMAL
POTASSIUM SERPL-SCNC: 4.6 MMOL/L (ref 3.5–5)
RBC # BLD: 4.11 E12/L (ref 3.8–5.8)
SCHISTOCYTES: ABNORMAL
SODIUM BLD-SCNC: 137 MMOL/L (ref 132–146)
TOTAL PROTEIN: 8.6 G/DL (ref 6.4–8.3)
TRIGL SERPL-MCNC: 74 MG/DL (ref 0–149)
VLDLC SERPL CALC-MCNC: 15 MG/DL
WBC # BLD: 11.3 E9/L (ref 4.5–11.5)

## 2021-12-23 ENCOUNTER — OFFICE VISIT (OUTPATIENT)
Dept: CARDIOLOGY CLINIC | Age: 86
End: 2021-12-23
Payer: MEDICARE

## 2021-12-23 VITALS
DIASTOLIC BLOOD PRESSURE: 62 MMHG | BODY MASS INDEX: 27.32 KG/M2 | WEIGHT: 170 LBS | HEART RATE: 72 BPM | SYSTOLIC BLOOD PRESSURE: 130 MMHG | RESPIRATION RATE: 20 BRPM | OXYGEN SATURATION: 96 % | HEIGHT: 66 IN

## 2021-12-23 DIAGNOSIS — F41.9 ANXIETY: ICD-10-CM

## 2021-12-23 DIAGNOSIS — I25.10 CORONARY ARTERY DISEASE INVOLVING NATIVE CORONARY ARTERY OF NATIVE HEART WITHOUT ANGINA PECTORIS: ICD-10-CM

## 2021-12-23 DIAGNOSIS — E78.5 DYSLIPIDEMIA: ICD-10-CM

## 2021-12-23 DIAGNOSIS — G47.00 INSOMNIA, UNSPECIFIED TYPE: ICD-10-CM

## 2021-12-23 DIAGNOSIS — R06.09 DOE (DYSPNEA ON EXERTION): ICD-10-CM

## 2021-12-23 DIAGNOSIS — K21.9 GASTROESOPHAGEAL REFLUX DISEASE WITHOUT ESOPHAGITIS: ICD-10-CM

## 2021-12-23 DIAGNOSIS — I10 ESSENTIAL HYPERTENSION: ICD-10-CM

## 2021-12-23 DIAGNOSIS — I25.10 CORONARY ARTERY DISEASE INVOLVING NATIVE CORONARY ARTERY OF NATIVE HEART WITHOUT ANGINA PECTORIS: Primary | ICD-10-CM

## 2021-12-23 DIAGNOSIS — E78.5 DYSLIPIDEMIA: Primary | ICD-10-CM

## 2021-12-23 PROCEDURE — 1123F ACP DISCUSS/DSCN MKR DOCD: CPT | Performed by: INTERNAL MEDICINE

## 2021-12-23 PROCEDURE — G8417 CALC BMI ABV UP PARAM F/U: HCPCS | Performed by: INTERNAL MEDICINE

## 2021-12-23 PROCEDURE — 99214 OFFICE O/P EST MOD 30 MIN: CPT | Performed by: INTERNAL MEDICINE

## 2021-12-23 PROCEDURE — 4040F PNEUMOC VAC/ADMIN/RCVD: CPT | Performed by: INTERNAL MEDICINE

## 2021-12-23 PROCEDURE — 1036F TOBACCO NON-USER: CPT | Performed by: INTERNAL MEDICINE

## 2021-12-23 PROCEDURE — G8484 FLU IMMUNIZE NO ADMIN: HCPCS | Performed by: INTERNAL MEDICINE

## 2021-12-23 PROCEDURE — 93000 ELECTROCARDIOGRAM COMPLETE: CPT | Performed by: INTERNAL MEDICINE

## 2021-12-23 PROCEDURE — G8427 DOCREV CUR MEDS BY ELIG CLIN: HCPCS | Performed by: INTERNAL MEDICINE

## 2021-12-23 RX ORDER — LORAZEPAM 1 MG/1
1 TABLET ORAL DAILY PRN
Qty: 15 TABLET | Refills: 4 | Status: SHIPPED | OUTPATIENT
Start: 2021-12-23 | End: 2022-12-18

## 2021-12-23 RX ORDER — EVOLOCUMAB 140 MG/ML
1 INJECTION, SOLUTION SUBCUTANEOUS
Qty: 2.1 ML | Refills: 12 | Status: SHIPPED
Start: 2021-12-23 | End: 2022-01-03 | Stop reason: ALTCHOICE

## 2021-12-23 NOTE — PROGRESS NOTES
OFFICE VISIT     PRIMARY CARE PHYSICIAN:      Josey Szymanski MD       ALLERGIES / SENSITIVITIES:        Allergies   Allergen Reactions    Iodine Rash     IV Iodine    Benadryl [Diphenhydramine]      IV BENADRYL     Ramipril      cough          REVIEWED MEDICATIONS:        Current Outpatient Medications:     Evolocumab (REPATHA) 140 MG/ML SOSY, Inject 1 mL into the skin every 14 days, Disp: 2.1 mL, Rfl: 12    LORazepam (ATIVAN) 1 MG tablet, Take 1 tablet by mouth daily as needed for Anxiety (For anxiety and insomnia) for up to 360 days. , Disp: 15 tablet, Rfl: 4    vitamin D (ERGOCALCIFEROL) 1.25 MG (41851 UT) CAPS capsule, Take 50,000 Units by mouth every 30 days, Disp: , Rfl:     patiromer sorbitex calcium (VELTASSA) 8.4 g PACK packet, Take 8.4 g by mouth every other day, Disp: , Rfl:     sodium bicarbonate 650 MG tablet, Take 650 mg by mouth 2 times daily, Disp: , Rfl:     tamsulosin (FLOMAX) 0.4 MG capsule, Take 0.4 mg by mouth daily, Disp: , Rfl:     zolpidem (AMBIEN) 5 MG tablet, Take 1 tablet by mouth nightly as needed for Sleep for up to 30 days. , Disp: 30 tablet, Rfl: 5    isosorbide mononitrate (IMDUR) 30 MG extended release tablet, Take 1 tablet by mouth daily, Disp: 90 tablet, Rfl: 3    allopurinol (ZYLOPRIM) 300 MG tablet, Take 1 tablet by mouth daily, Disp: 30 tablet, Rfl: 11    ezetimibe (ZETIA) 10 MG tablet, Take 1 tablet by mouth daily, Disp: 30 tablet, Rfl: 6    metFORMIN (GLUCOPHAGE) 1000 MG tablet, Take 1 tablet by mouth 2 times daily (with meals), Disp: 180 tablet, Rfl: 3    carvedilol (COREG) 3.125 MG tablet, Take 1 tablet by mouth 2 times daily, Disp: 180 tablet, Rfl: 3    nateglinide (STARLIX) 120 MG tablet, TAKE ONE TABLET BY MOUTH THREE TIMES A DAY BEFORE MEALS, Disp: 270 tablet, Rfl: 1    atorvastatin (LIPITOR) 80 MG tablet, Take 80 mg by mouth daily , Disp: , Rfl:     docusate sodium (COLACE) 100 MG capsule, Take 1 capsule by mouth daily as needed for Constipation, Disp: , Rfl:     omeprazole (PRILOSEC) 20 MG delayed release capsule, Take 20 mg by mouth daily PRN, Disp: , Rfl:     nitroGLYCERIN (NITROSTAT) 0.4 MG SL tablet, Place 1 tablet under the tongue every 5 minutes as needed for Chest pain, Disp: 25 tablet, Rfl: 3    aspirin 81 MG EC tablet, Take 81 mg by mouth daily. , Disp: , Rfl:     vitamin B-12 (CYANOCOBALAMIN) 1000 MCG tablet, Take 1,000 mcg by mouth daily. , Disp: , Rfl:     folic acid (FOLVITE) 117 MCG tablet, Take 400 mcg by mouth daily , Disp: , Rfl:     glucose monitoring kit (FREESTYLE) monitoring kit, 1 kit by Does not apply route daily as needed (as directed), Disp: 1 kit, Rfl: 0    blood glucose test strips (ASCENSIA AUTODISC VI;ONE TOUCH ULTRA TEST VI) strip, 1 each by In Vitro route daily As needed. One Touch Ultra 2 Test strips DX: E11.9, Disp: 100 each, Rfl: 3    Blood Glucose Calibration (OT ULTRA/FASTTK CNTRL SOLN) SOLN, 1 Bottle by In Vitro route as needed (as directed), Disp: 1 each, Rfl: 3      S: REASON FOR VISIT:       Chief Complaint   Patient presents with    Coronary Artery Disease     8 month ov. Denies ED/Hosp admits. Labs 12/16/21. Is c/o SOBOE. No other cardiac complaints. 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 Anxiety. C/O mild SOBOE, chronic, no worse, No Orthopnea   No hospitalizations or surgeries since last visit   Patient is compliant with all medications   Emile any exertional chest pain   No palpitations, dizzy or syncope.    Active at home   No orthopnea   Try to watch diet          Past Medical History:   Diagnosis Date    Anal fissure 10/27/2016    Anemia     Arthritis     CAD (coronary artery disease)     Diabetes mellitus ([facility] [address]     ADULT ONSET    GERD (gastroesophageal reflux disease) 10/27/2016    Gout     History of blood transfusion     Hyperkalemia     Hyperlipidemia     Low back pain 07/21/2017    Low grade myelodysplastic syndrome lesions (HCC) 06/13/2017    OA (osteoarthritis) 10/27/2016    Pain of left lower extremity 07/21/2017    Rectal fissure 1987            Past Surgical History:   Procedure Laterality Date    CHOLECYSTECTOMY, LAPAROSCOPIC  04/10/2018    Dr Nelly Lynch  01/16/89    PTCA/PROXIMAL LAD, PTCA MID LAD ([facility])    CORONARY ANGIOPLASTY  04/12/89    REPEAT PTCA TO MID LAD ([facility])    CORONARY ANGIOPLASTY  09/22/11    PTCA/ROTATIONAL ATHERECTOMY & DOUG TO MID LAD, PTCA TO OSTIUM OF DIAGONAL BRANCH THROUGH STENTED SEGMENT IN  LAD.  CORONARY ANGIOPLASTY  10/19/11    PTCA/DOUG TO PROXIMAL RCA (DR. YOUNGER)    CORONARY ANGIOPLASTY WITH STENT PLACEMENT  12/3/15    Dr. Rena Howard 3.0x15 RCA    DIAGNOSTIC CARDIAC CATH LAB PROCEDURE  01/11/89    @[facility]    DIAGNOSTIC CARDIAC CATH LAB PROCEDURE  01/16/89    @[facility]    DIAGNOSTIC CARDIAC CATH LAB PROCEDURE  09/22/11    ENDOSCOPY, COLON, DIAGNOSTIC      JOINT REPLACEMENT  1999    KNEE    LUMBAR LAMINECTOMY  09/08/2017    lumbar laminectomy  [id number]    NM TUMOR LOCALIZATION SPECT MULTI DAY  08/15/11    CTA CORONARIES:  PROXIMAL TO MID LAD STENOSIS, PROXIMAL AND DISTAL RCA STENOSIS, CALCIUM SCORE 2594.     TOTAL KNEE ARTHROPLASTY  10/99    BILATERAL    UPPER GASTROINTESTINAL ENDOSCOPY  09/26/2017    with colonoscopy          Family History   Problem Relation Age of Onset    Heart Disease Brother     Diabetes Brother     Parkinsonism Brother           Social History     Tobacco Use    Smoking status: Never Smoker    Smokeless tobacco: Never Used   Substance Use Topics    Alcohol use: Yes     Comment: rare mixed drink          Review of Systems:  Constitutional: negative for fever and chills, or significant weight loss  HEENT: negative for acute visual symptoms or auditory problems, no dysphagia  Respiratory: negative for cough, wheezing, or hemoptysis  Cardiovascular: negative for chest pain, palpitations, and dyspnea  Gastrointestinal: negative for abdominal pain, diarrhea, nausea and vomiting  Endocrine: Negative for polyuria and polydyspsia  Genitourinary:negative for dysuria and hematuria  Derm: negative for rash and skin lesion(s)  Neurological: negative for tingling, numbness, weakness, seizures and tremors  Endocrine: negative for polydipsia and polyuria  Musculoskeletal: negative for pain or tenderness  Psychiatric: negative for anxiety, depression, or suicidal ideations         O:  COMPLETE PHYSICAL EXAM:       /62 (Site: Right Upper Arm, Position: Sitting, Cuff Size: Medium Adult)   Pulse 72   Resp 20   Ht 5' 6\" (1.676 m)   Wt 170 lb (77.1 kg)   SpO2 96%   BMI 27.44 kg/m²       General:   Patient alert, comfortable, no distress. Appears stated age. HEENT:    Pupils equal, no icterus, no nasal drainage, tongue moist.   Neck:              No masses, Thyroid not palpable. No elevated JVD, No carotid bruit. Chest:   Normal configuration, non tender. Lungs:   Clear to auscultation bilaterally, few scattered rhonchi. Cardiovascular:  Regular rhythm, 1/6 systolic murmur, No S3, PMI at 5th ICS, no palpable thrills,    Abdomen:  Soft, Non tender, Bowel sounds normal   Extremities:  No edema. Distal pulses palpable. No cyanosis, no clubbing. Skin:   Good turgor, warm and dry, no cyanosis. Musculoskeletal: No joint swelling or deformity. Neuro:   Cranial nerves grossly intact; No focal neurologic deficit. Psych:   Alert, good mood and effect. REVIEW OF DIAGNOSTIC TESTS:        Electrocardiogram: Normal - Sinus rhythm      Labs:  Reviewed from 12/16/21 Cho 133, LDL 81, HDL 34; K+ 4.9; Hgb 10.9, A1c 5.9     Echo 9/9/2017   Summary   Normal left ventricular systolic function. Ejection fraction is visually estimated at 60-65%. Mild concentric left ventricular hypertrophy.    Normal right ventricular size and function (TAPSE 1.8 cm). Mild mitral regurgitation. Physiologic and/or trace tricuspid regurgitation. PASP is estimated at 35 mmHg. A/P:   ASSESSMENT / PLAN:    Zoila De Jesus was seen today for coronary artery disease and shortness of breath. Diagnoses and all orders for this visit:      Insomnia, unspecified type  -     LORazepam (ATIVAN) 1 MG tablet; Take 1 tablet by mouth daily as needed for Anxiety (For anxiety and insomnia) for up to 360 days. Coronary artery disease of native coronaries without angina; On Aspirin, Coreg, Altace, and Statin. Stable - Recommend PCSK9 due to LDL 81 on maximal Statins/Zetia; agreeable.   -     EKG 12 Lead    - S/p PTCA of Proximal and Mid LAD in ZRJ [address] [facility].    - S/p Repeat PTCA of LAD in D[facility] 0276 at [facility].  - S/p Rotational atherectomy and 2.25-mm/16-mm Scimed Ion DOUG to Mid LAD, Lengthy procedure and also PTCA of ostial Diagonal branch through LAD stent - Dr Aime Bateman at [place] [address] on 9/22/2011  - S/p 3.0-mm/28-mm Xience Abbott DOUG to proximal RCA.  Also there is 50% Mid RCA, 50% distal RCA, Prox ISA 20% stenosis, Small caliber superior branch of PDA ostial 70%, larger branch of PDA ostial 50% stenosis - Dr Lora at [facility] on 10/9/2011.  - Coronary CTA 7/20//2015 due to JANE/Angina equivalent; showed high grade RCA stenosis; Sub-optimal study due to calcified coronaries.  - S/p 3.5-mm x 18-mm Alpine-RX Abbott DOUG to distal RCA. Also there is Prox LAD 50%; Mild LAD two areas-mid and distal edge in-stent restenosis, 70% OM1 stenosis; calcified coronaries, Dr Aime Bateman, at [place] [address] on 12/3/2015.        Hx of IVP contrast-Iodine allergy, He had no issues with premedicating (Steroids, Benadryl +/- H2 blockers) prior to contrast procedures.     PAD (peripheral artery disease) ([address]  Absent DP pulses on both feet; Had doppler studies [facility] RD 3925     LV diastolic dysfunction, stable; EF 60-65% by cardiac cath in 12/2015 and Echo in 9/2017.     JANE (dyspnea on exertion), chronic, unchanged - Multifactorial - no acute CHF      Hx of Hyperkalemia - Dr Justus Gandhi monitoring and manages    Mitral regurgitation, Mild, by 9/2017 Echo       Obesity, non-morbid: Diet, exercise and weight loss discussed.      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 Preventive Cardiology: Low cholesterol diet, regular exercise as tolerate, and gradual weight loss discussed. Monitor BP and heart rates. Above recommendations discussed with him   All questions answered about cardiac diagnoses and cardiac medications. Continue current medications. Compliance with medications and f/u with all physicians discussed. Risk factor modification based on risk profile discussed. Call if any exertional chest pain, short of breath, dizzy or palpitations   Follow up in 6 months or earlier if needed.          [facility]  [address], [place], [address] [facility]  [phone]

## 2022-01-03 NOTE — PROGRESS NOTES
Yoseph Boyce declined by his Insurance; changed to [employer] per his Insurance requirement    Electronically signed by Shivam Brooke MD on 1/3/2022 at 1:21 PM

## 2022-01-05 ENCOUNTER — HOSPITAL ENCOUNTER (OUTPATIENT)
Age: 87
Discharge: HOME | End: 2022-01-05
Payer: MEDICARE

## 2022-01-05 LAB
ALBUMIN SERPL-MCNC: 4.3 G/DL (ref 3.5–5.2)
ALP BLD-CCNC: 43 U/L (ref 40–129)
ALT SERPL-CCNC: 16 U/L (ref 0–40)
ANION GAP SERPL CALCULATED.3IONS-SCNC: 12 MMOL/L (ref 7–16)
AST SERPL-CCNC: 18 U/L (ref 0–39)
BILIRUB SERPL-MCNC: <0.2 MG/DL (ref 0–1.2)
BUN BLDV-MCNC: 26 MG/DL (ref 6–23)
CALCIUM SERPL-MCNC: 9.2 MG/DL (ref 8.6–10.2)
CHLORIDE BLD-SCNC: 99 MMOL/L (ref 98–107)
CO2: 22 MMOL/L (ref 22–29)
CREAT SERPL-MCNC: 1.3 MG/DL (ref 0.7–1.2)
GFR AFRICAN AMERICAN: >60
GFR NON-AFRICAN AMERICAN: 53 ML/MIN/1.73
GLUCOSE BLD-MCNC: 111 MG/DL (ref 74–99)
POTASSIUM SERPL-SCNC: 4.8 MMOL/L (ref 3.5–5)
SODIUM BLD-SCNC: 133 MMOL/L (ref 132–146)
TOTAL PROTEIN: 8.5 G/DL (ref 6.4–8.3)
VITAMIN D 25-HYDROXY: 22 NG/ML (ref 30–100)

## 2022-01-05 PROCEDURE — 80053 COMPREHEN METABOLIC PANEL: CPT

## 2022-01-05 PROCEDURE — 82306 VITAMIN D 25 HYDROXY: CPT

## 2022-01-05 PROCEDURE — 36415 COLL VENOUS BLD VENIPUNCTURE: CPT

## 2022-01-10 RX ORDER — CARVEDILOL 3.12 MG/1
TABLET ORAL
Qty: 180 TABLET | Refills: 0 | Status: SHIPPED | OUTPATIENT
Start: 2022-01-10

## 2022-02-17 ENCOUNTER — OFFICE VISIT (OUTPATIENT)
Dept: PRIMARY CARE CLINIC | Age: 87
End: 2022-02-17
Payer: MEDICARE

## 2022-02-17 VITALS
BODY MASS INDEX: 27.6 KG/M2 | SYSTOLIC BLOOD PRESSURE: 122 MMHG | TEMPERATURE: 97.2 F | HEART RATE: 78 BPM | WEIGHT: 171 LBS | OXYGEN SATURATION: 98 % | DIASTOLIC BLOOD PRESSURE: 64 MMHG

## 2022-02-17 DIAGNOSIS — E11.51 TYPE II DIABETES MELLITUS WITH PERIPHERAL CIRCULATORY DISORDER (HCC): ICD-10-CM

## 2022-02-17 DIAGNOSIS — D46.20 LOW GRADE MYELODYSPLASTIC SYNDROME LESIONS (HCC): ICD-10-CM

## 2022-02-17 DIAGNOSIS — H91.90 DIMINISHED HEARING, UNSPECIFIED LATERALITY: Primary | ICD-10-CM

## 2022-02-17 PROCEDURE — 99203 OFFICE O/P NEW LOW 30 MIN: CPT | Performed by: INTERNAL MEDICINE

## 2022-02-17 PROCEDURE — G8484 FLU IMMUNIZE NO ADMIN: HCPCS | Performed by: INTERNAL MEDICINE

## 2022-02-17 PROCEDURE — 4040F PNEUMOC VAC/ADMIN/RCVD: CPT | Performed by: INTERNAL MEDICINE

## 2022-02-17 PROCEDURE — 1036F TOBACCO NON-USER: CPT | Performed by: INTERNAL MEDICINE

## 2022-02-17 PROCEDURE — G8417 CALC BMI ABV UP PARAM F/U: HCPCS | Performed by: INTERNAL MEDICINE

## 2022-02-17 PROCEDURE — G8427 DOCREV CUR MEDS BY ELIG CLIN: HCPCS | Performed by: INTERNAL MEDICINE

## 2022-02-17 PROCEDURE — 1123F ACP DISCUSS/DSCN MKR DOCD: CPT | Performed by: INTERNAL MEDICINE

## 2022-02-17 RX ORDER — ZOLPIDEM TARTRATE 5 MG/1
5 TABLET ORAL NIGHTLY PRN
COMMUNITY

## 2022-02-17 NOTE — PROGRESS NOTES
2/17/22    Claryce Hodgkins, a male of 82 y.o. came to the office    HPI     Claryce Hodgkins presents today as a new patient. 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 His medications include Praluent Zyloprim Coreg Colace Zetia Imdur Ativan Glucophage Starlix nitroglycerin aspirin Lipitor folic acid omeprazole Flomax vitamin D and Ambien. He does have shortness of breath. It was felt aht his dyspena was due to diastolic dysfunction. He is having some hearing issues. Surgical History  Bilateral knee replacement  Laminectomy  Rectal fissure repair    Family History  None    Social History  Occupation-  Cardiologist   No tobacco abuse, tobacco     /64   Pulse 78   Temp 97.2 °F (36.2 °C)   Wt 171 lb (77.6 kg)   SpO2 98%   BMI 27.60 kg/m²     Allergies   Allergen Reactions    Iodine Rash     IV Iodine    Benadryl [Diphenhydramine]      IV BENADRYL     Ramipril      cough       Current Outpatient Medications on File Prior to Visit   Medication Sig Dispense Refill    zolpidem (AMBIEN) 5 MG tablet Take 5 mg by mouth nightly as needed for Sleep.  carvedilol (COREG) 3.125 MG tablet TAKE ONE TABLET BY MOUTH TWO TIMES A  tablet 0    alirocumab (PRALUENT) 75 MG/ML SOAJ injection pen Inject 1 mL into the skin every 14 days 2.24 mL 6    LORazepam (ATIVAN) 1 MG tablet Take 1 tablet by mouth daily as needed for Anxiety (For anxiety and insomnia) for up to 360 days.  15 tablet 4    vitamin D (ERGOCALCIFEROL) 1.25 MG (10399 UT) CAPS capsule Take 50,000 Units by mouth every 30 days      patiromer sorbitex calcium (VELTASSA) 8.4 g PACK packet Take 8.4 g by mouth every other day      sodium bicarbonate 650 MG tablet Take 650 mg by mouth 2 times daily      tamsulosin (FLOMAX) 0.4 MG capsule Take 0.4 mg by mouth daily      isosorbide mononitrate (IMDUR) 30 MG extended release tablet Take 1 tablet by mouth daily 90 tablet 3    allopurinol (ZYLOPRIM) 300 MG tablet Take 1 tablet by mouth daily 30 tablet 11    ezetimibe (ZETIA) 10 MG tablet Take 1 tablet by mouth daily 30 tablet 6    metFORMIN (GLUCOPHAGE) 1000 MG tablet Take 1 tablet by mouth 2 times daily (with meals) 180 tablet 3    nateglinide (STARLIX) 120 MG tablet TAKE ONE TABLET BY MOUTH THREE TIMES A DAY BEFORE MEALS 270 tablet 1    atorvastatin (LIPITOR) 80 MG tablet Take 80 mg by mouth daily       glucose monitoring kit (FREESTYLE) monitoring kit 1 kit by Does not apply route daily as needed (as directed) 1 kit 0    blood glucose test strips (ASCENSIA AUTODISC VI;ONE TOUCH ULTRA TEST VI) strip 1 each by In Vitro route daily As needed. One Touch Ultra 2 Test strips DX: E11.9 100 each 3    Blood Glucose Calibration (OT ULTRA/FASTTK CNTRL SOLN) SOLN 1 Bottle by In Vitro route as needed (as directed) 1 each 3    docusate sodium (COLACE) 100 MG capsule Take 1 capsule by mouth daily as needed for Constipation      omeprazole (PRILOSEC) 20 MG delayed release capsule Take 20 mg by mouth daily PRN      nitroGLYCERIN (NITROSTAT) 0.4 MG SL tablet Place 1 tablet under the tongue every 5 minutes as needed for Chest pain 25 tablet 3    aspirin 81 MG EC tablet Take 81 mg by mouth daily.  vitamin B-12 (CYANOCOBALAMIN) 1000 MCG tablet Take 1,000 mcg by mouth daily.  folic acid (FOLVITE) 954 MCG tablet Take 400 mcg by mouth daily        No current facility-administered medications on file prior to visit. Review of Systems   Constitutional: Negative for activity change, appetite change, chills and fatigue. HENT: Negative for hearing loss, Negative for congestion. Respiratory: Negative for chest tightness, shortness of breath and wheezing. Cardiovascular: Negative for leg swelling Negative for chest pain and palpitations.    Gastrointestinal: Negative for abdominal pain, Negative for abdominal distention, constipation and diarrhea. Genitourinary: Negative for difficulty urinating, dysuria and frequency. Musculoskeletal: Negative for arthralgias, back pain, gait problem and joint swelling. Skin: Negative for color change rash or wound     Neurological: Negative for dizziness, seizures and headaches. Hematological: Negative for adenopathy. Does not bruise/bleed easily. Psychiatric/Behavioral: Negative for agitation, behavioral problems, confusion and decreased concentration. OBJECTIVE:    Physical Exam   Constitutional: Oriented to person, place, and time. Appears well-developed and well-nourished. No distress. HENT:   Head: Normocephalic and atraumatic. Eyes: Pupils are equal, round, and reactive to light. EOM are normal.   Neck: Neck supple. No JVD present. No tracheal deviation present. No thyromegaly present. Cardiovascular: Normal rate, regular rhythm, normal heart sounds and intact distal pulses. Exam reveals no gallop and no friction rub. No murmur heard. Pulmonary/Chest: Effort normal and breath sounds normal. No stridor. No respiratory distress. No wheezes or rales. Abdominal: Soft. Bowel sounds are normal. There is no distension nor mass. There is no tenderness. There is no guarding. Musculoskeletal: No edema tenderness or deformity  Neurological: Alert and oriented to person, place, and time. No cranial nerve deficit. Normal muscle tone. Coordination normal.   Skin: Skin is warm and dry. No diaphoresis. No erythema. Psychiatric: Normal mood and affect. Behavior is normal.     ASSESSMENT AND PLAN:    Flower Rothman was seen today for established new doctor. Diagnoses and all orders for this visit:    Diminished hearing, unspecified laterality  -     Amb External Referral To ENT    Low grade myelodysplastic syndrome lesions ([address]    Type II diabetes mellitus with peripheral circulatory disorder ([facility]        Plan    1.  I will refer him to otolaryngology for hearing evaluation  2. He is now taking vitamin D supplement  3. I will repeat his blood work in 5 months  4. His dyspnea is stable      Return in about 6 months (around 8/17/2022). Electronically signed by Kourtney Blount DO on 2/17/2022 at 2:37 PM    Please note that the above documentation was prepared using voice recognition software. Every attempt was made to ensure accuracy but there may be spelling, grammatical, and contextual errors.   Kourtney Blount DO

## 2022-02-22 DIAGNOSIS — M48.07 SPINAL STENOSIS, LUMBOSACRAL REGION: Primary | ICD-10-CM

## 2022-04-11 RX ORDER — ALLOPURINOL 300 MG/1
TABLET ORAL
Qty: 90 TABLET | Refills: 3 | Status: SHIPPED | OUTPATIENT
Start: 2022-04-11

## 2022-04-29 RX ORDER — ISOSORBIDE MONONITRATE 30 MG/1
TABLET, EXTENDED RELEASE ORAL
Qty: 90 TABLET | Refills: 3 | Status: SHIPPED | OUTPATIENT
Start: 2022-04-29

## 2022-07-11 ENCOUNTER — OFFICE VISIT (OUTPATIENT)
Dept: CARDIOLOGY CLINIC | Age: 87
End: 2022-07-11
Payer: MEDICARE

## 2022-07-11 VITALS
WEIGHT: 171 LBS | RESPIRATION RATE: 26 BRPM | DIASTOLIC BLOOD PRESSURE: 60 MMHG | HEIGHT: 66 IN | BODY MASS INDEX: 27.48 KG/M2 | SYSTOLIC BLOOD PRESSURE: 106 MMHG | HEART RATE: 68 BPM

## 2022-07-11 DIAGNOSIS — R06.02 SOBOE (SHORTNESS OF BREATH ON EXERTION): ICD-10-CM

## 2022-07-11 DIAGNOSIS — E78.5 DYSLIPIDEMIA: ICD-10-CM

## 2022-07-11 DIAGNOSIS — I10 ESSENTIAL HYPERTENSION: ICD-10-CM

## 2022-07-11 DIAGNOSIS — I25.10 CORONARY ARTERY DISEASE INVOLVING NATIVE CORONARY ARTERY OF NATIVE HEART WITHOUT ANGINA PECTORIS: Primary | ICD-10-CM

## 2022-07-11 DIAGNOSIS — I34.0 NONRHEUMATIC MITRAL VALVE REGURGITATION: ICD-10-CM

## 2022-07-11 PROCEDURE — 99214 OFFICE O/P EST MOD 30 MIN: CPT | Performed by: INTERNAL MEDICINE

## 2022-07-11 PROCEDURE — 1123F ACP DISCUSS/DSCN MKR DOCD: CPT | Performed by: INTERNAL MEDICINE

## 2022-07-11 PROCEDURE — G8427 DOCREV CUR MEDS BY ELIG CLIN: HCPCS | Performed by: INTERNAL MEDICINE

## 2022-07-11 PROCEDURE — 93000 ELECTROCARDIOGRAM COMPLETE: CPT | Performed by: INTERNAL MEDICINE

## 2022-07-11 PROCEDURE — 1036F TOBACCO NON-USER: CPT | Performed by: INTERNAL MEDICINE

## 2022-07-11 PROCEDURE — G8417 CALC BMI ABV UP PARAM F/U: HCPCS | Performed by: INTERNAL MEDICINE

## 2022-07-11 NOTE — PROGRESS NOTES
OFFICE VISIT     PRIMARY CARE PHYSICIAN:      Caryn Carl DO       ALLERGIES / SENSITIVITIES:        Allergies   Allergen Reactions    Iodine Rash     IV Iodine    Benadryl [Diphenhydramine]      IV BENADRYL     Ramipril      cough          REVIEWED MEDICATIONS:        Current Outpatient Medications:     alirocumab (PRALUENT) 75 MG/ML SOAJ injection pen, Inject 1 mL into the skin every 14 days, Disp: 2.24 mL, Rfl: 6    empagliflozin (JARDIANCE) 10 MG tablet, Take 1 tablet by mouth daily, Disp: 30 tablet, Rfl: 6    isosorbide mononitrate (IMDUR) 30 MG extended release tablet, TAKE ONE TABLET BY MOUTH DAILY, Disp: 90 tablet, Rfl: 3    allopurinol (ZYLOPRIM) 300 MG tablet, TAKE ONE TABLET BY MOUTH EVERY DAY, Disp: 90 tablet, Rfl: 3    zolpidem (AMBIEN) 5 MG tablet, Take 5 mg by mouth nightly as needed for Sleep., Disp: , Rfl:     carvedilol (COREG) 3.125 MG tablet, TAKE ONE TABLET BY MOUTH TWO TIMES A DAY, Disp: 180 tablet, Rfl: 0    LORazepam (ATIVAN) 1 MG tablet, Take 1 tablet by mouth daily as needed for Anxiety (For anxiety and insomnia) for up to 360 days. , Disp: 15 tablet, Rfl: 4    vitamin D (ERGOCALCIFEROL) 1.25 MG (34848 UT) CAPS capsule, Take 50,000 Units by mouth every 30 days, Disp: , Rfl:     patiromer sorbitex calcium (VELTASSA) 8.4 g PACK packet, Take 8.4 g by mouth every other day, Disp: , Rfl:     sodium bicarbonate 650 MG tablet, Take 650 mg by mouth 2 times daily, Disp: , Rfl:     tamsulosin (FLOMAX) 0.4 MG capsule, Take 0.4 mg by mouth daily, Disp: , Rfl:     ezetimibe (ZETIA) 10 MG tablet, Take 1 tablet by mouth daily, Disp: 30 tablet, Rfl: 6    metFORMIN (GLUCOPHAGE) 1000 MG tablet, Take 1 tablet by mouth 2 times daily (with meals), Disp: 180 tablet, Rfl: 3    nateglinide (STARLIX) 120 MG tablet, TAKE ONE TABLET BY MOUTH THREE TIMES A DAY BEFORE MEALS, Disp: 270 tablet, Rfl: 1    atorvastatin (LIPITOR) 80 MG tablet, Take 80 mg by mouth daily , Disp: , Rfl:    (coronary artery disease)     Diabetes mellitus ([facility] [address]     ADULT ONSET    GERD (gastroesophageal reflux disease) 10/27/2016    Gout     History of blood transfusion     Hyperkalemia     Hyperlipidemia     Low back pain 07/21/2017    Low grade myelodysplastic syndrome lesions (HCC) 06/13/2017    OA (osteoarthritis) 10/27/2016    Pain of left lower extremity 07/21/2017    Rectal fissure 1987            Past Surgical History:   Procedure Laterality Date    CHOLECYSTECTOMY, LAPAROSCOPIC  04/10/2018    Dr Samy Arcos  01/16/89    PTCA/PROXIMAL LAD, PTCA MID LAD ([facility])    CORONARY ANGIOPLASTY  04/12/89    REPEAT PTCA TO MID LAD ([facility])    CORONARY ANGIOPLASTY  09/22/11    PTCA/ROTATIONAL ATHERECTOMY & DOUG TO MID LAD, PTCA TO OSTIUM OF DIAGONAL BRANCH THROUGH STENTED SEGMENT IN  LAD.  CORONARY ANGIOPLASTY  10/19/11    PTCA/DOUG TO PROXIMAL RCA (DR. YOUNGER)    CORONARY ANGIOPLASTY WITH STENT PLACEMENT  12/3/15    Dr. Jignesh Perez 3.0x15 RCA    DIAGNOSTIC CARDIAC CATH LAB PROCEDURE  01/11/89    @[facility]    DIAGNOSTIC CARDIAC CATH LAB PROCEDURE  01/16/89    @[facility]    DIAGNOSTIC CARDIAC CATH LAB PROCEDURE  09/22/11    ENDOSCOPY, COLON, DIAGNOSTIC      JOINT REPLACEMENT  1999    KNEE    LUMBAR LAMINECTOMY  09/08/2017    lumbar laminectomy  [id number]    NM TUMOR LOCALIZATION SPECT MULTI DAY  08/15/11    CTA CORONARIES:  PROXIMAL TO MID LAD STENOSIS, PROXIMAL AND DISTAL RCA STENOSIS, CALCIUM SCORE 2594.     TOTAL KNEE ARTHROPLASTY  10/99    BILATERAL    UPPER GASTROINTESTINAL ENDOSCOPY  09/26/2017    with colonoscopy          Family History   Problem Relation Age of Onset    Heart Disease Brother     Diabetes Brother     Parkinsonism Brother           Social History     Tobacco Use    Smoking status: Never Smoker    Smokeless tobacco: Never Used   Substance Use Topics    Alcohol use: Yes     Comment: rare mixed drink          Review of Systems:    Constitutional: negative for ventricular hypertrophy.   Normal right ventricular size and function (TAPSE 1.8 cm).   Mild mitral regurgitation.   Physiologic and/or trace tricuspid regurgitation.   PASP is estimated at 35 mmHg. [facility] - 12/3/2015   CONCLUSIONS:   1. Coronary artery disease. a. Left main: Heavily calcified vessel but with no significant      angiographic luminal  narrowings. b.    LAD: Heavily calcified proximal vessel with 50% concentric, very      proximal vessel    narrowing. 50% in-stent restenosis in the      proximal/midvessel with 50% stenosis at the  distal edge of the stent in      the midvessel with the rest of the LAD becoming a very  small-caliber      vessel distally. 20% ostial disease of a very large diagonal branch      across   which the stent was deployed in the past with the rest of the      diagonal branch showing no  significant disease. c.    LCX: 70% stenosis of the high 1st obtuse marginal branch with the      rest of the left   circumflex showing no significant disease. d.    RCA: Large, dominant vessel with patent stent in the      proximal/midvessel with an  eccentric, hazy, 90% stenosis (type B lesion)      in the distal level at the level of the crux  with no significant disease      of the posterolateral branch with 50% proximal disease of  the      superior/posterior descending artery branch and 50% ostial disease of the      larger   inferior posterior descending artery branch. 2.    Normal ventricular size and systolic function with an estimated      ejection fraction of 65% without mitral regurgitation and with no      significant gradient across the aortic valve. 3.    Systemic hypertension. 4.    Mildly elevated left ventricular end diastolic pressure suggestive of      mild LV diastolic dysfunction.    5.    Difficult but successful angioplasty with the deployment of a      drug-eluting coronary stent to the distal right coronary artery with very      good results. 6.    Closure of the right femoral artery access site with the Angio-Seal      device. Stress test 10/2015      ASSESSMENT / PLAN:    Jacob Lloyd was seen today for coronary artery disease and cardiac valve problem. Diagnoses and all orders for this visit:     Coronary artery disease of native coronaries without angina; On Aspirin, Coreg, Altace, and Statin + PCSK9 (added due to LDL 81 on maximal Statins/Zetia). Add Jardiance due to CAD and diabetes. Side effects franklin. UTIs discussed, agreeable.   -     EKG 12 Lead    -     Echo limited; Future    - S/p PTCA of Proximal and Mid LAD in [id number] [facility].    - S/p Repeat PTCA of LAD in [id number]4 at [facility].  - S/p Rotational atherectomy and 2.25-mm/16-mm Scimed Ion DOUG to Mid LAD, Lengthy procedure and also PTCA of ostial Diagonal branch through LAD stent - Dr Avi Roque at [facility]. Ez on 9/22/2011  - S/p 3.0-mm/28-mm Xience Abbott DOUG to proximal RCA.  Also there is 50% Mid RCA, 50% distal RCA, Prox ISA 20% stenosis, Small caliber superior branch of PDA ostial 70%, larger branch of PDA ostial 50% stenosis - Dr Lora at [facility] 10/9/2011.  - Coronary CTA 7/20//2015 due to JANE/Angina equivalent; showed high grade RCA stenosis; Sub-optimal study due to calcified coronaries.  - S/p 3.5-mm x 18-mm Alpine-RX Abbott DOUG to distal RCA.  Also there is Prox LAD 50%; Mild LAD two areas-mid and distal edge in-stent restenosis, 70% OM1 stenosis; calcified coronaries, Dr Avi Roque, at [facility]. Ez on 12/3/2015.        Hx of IVP contrast-Iodine allergy, He had no issues with premedicating (Steroids, Benadryl +/- H2 blockers) prior to contrast procedures.     PAD (peripheral artery disease) ([facility] [address]  Absent DP pulses on both feet; Had doppler studies [facility] 8127     LV diastolic dysfunction, stable; EF 60-65% by cardiac cath in 12/2015 and Echo in 9/2017.     SOBOE (shortness of breath on exertion) - Chronic, unchanged - Multifactorial - no acute CHF    -     Echo limited; Future    Chronic intermittent tdizzy - Monitor BP, avoid dehydration    Hx of Hyperkalemia - Dr Caro Venegas monitoring     Mitral regurgitation, Mild, by 9/2017 Echo     -     Echo limited; Future    Obesity, non-morbid: Diet, exercise and weight loss discussed.      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 Above recommendations discussed. The patient's current medication list, allergies, problem list and results of prior tests (as available) were reviewed at today's visit   All questions answered about cardiac diagnoses and cardiac medications. Continue current medications. Monitor BP and heart rates. Compliance with medications and f/u with all physicians discussed. Risk factor modification based on risk profile discussed. Call if any exertional chest pain, short of breath, dizzy or palpitations. Follow up in 6 months or earlier if needed.          [facility]  [address] [facility] [address] [facility], [address] [place] [address]  [phone]

## 2022-07-28 ENCOUNTER — TELEPHONE (OUTPATIENT)
Dept: CARDIOLOGY | Age: 87
End: 2022-07-28

## 2022-07-29 ENCOUNTER — HOSPITAL ENCOUNTER (OUTPATIENT)
Dept: CARDIOLOGY | Age: 87
Discharge: HOME | End: 2022-07-29
Payer: MEDICARE

## 2022-07-29 DIAGNOSIS — R06.02 SOBOE (SHORTNESS OF BREATH ON EXERTION): ICD-10-CM

## 2022-07-29 DIAGNOSIS — I25.10 CORONARY ARTERY DISEASE INVOLVING NATIVE CORONARY ARTERY OF NATIVE HEART WITHOUT ANGINA PECTORIS: ICD-10-CM

## 2022-07-29 DIAGNOSIS — I34.0 NONRHEUMATIC MITRAL VALVE REGURGITATION: ICD-10-CM

## 2022-07-29 PROCEDURE — 93308 TTE F-UP OR LMTD: CPT

## 2022-08-22 ENCOUNTER — OFFICE VISIT (OUTPATIENT)
Dept: PRIMARY CARE CLINIC | Age: 87
End: 2022-08-22
Payer: MEDICARE

## 2022-08-22 VITALS
WEIGHT: 169 LBS | OXYGEN SATURATION: 98 % | HEART RATE: 67 BPM | BODY MASS INDEX: 27.28 KG/M2 | SYSTOLIC BLOOD PRESSURE: 128 MMHG | TEMPERATURE: 97.5 F | DIASTOLIC BLOOD PRESSURE: 64 MMHG

## 2022-08-22 DIAGNOSIS — E11.51 TYPE II DIABETES MELLITUS WITH PERIPHERAL CIRCULATORY DISORDER (HCC): ICD-10-CM

## 2022-08-22 DIAGNOSIS — M48.07 SPINAL STENOSIS, LUMBOSACRAL REGION: ICD-10-CM

## 2022-08-22 DIAGNOSIS — Z00.00 MEDICARE ANNUAL WELLNESS VISIT, SUBSEQUENT: Primary | ICD-10-CM

## 2022-08-22 PROCEDURE — G0439 PPPS, SUBSEQ VISIT: HCPCS | Performed by: INTERNAL MEDICINE

## 2022-08-22 PROCEDURE — 1123F ACP DISCUSS/DSCN MKR DOCD: CPT | Performed by: INTERNAL MEDICINE

## 2022-08-22 ASSESSMENT — PATIENT HEALTH QUESTIONNAIRE - PHQ9
SUM OF ALL RESPONSES TO PHQ QUESTIONS 1-9: 0
SUM OF ALL RESPONSES TO PHQ9 QUESTIONS 1 & 2: 0
SUM OF ALL RESPONSES TO PHQ QUESTIONS 1-9: 0
2. FEELING DOWN, DEPRESSED OR HOPELESS: 0
SUM OF ALL RESPONSES TO PHQ QUESTIONS 1-9: 0
SUM OF ALL RESPONSES TO PHQ QUESTIONS 1-9: 0
1. LITTLE INTEREST OR PLEASURE IN DOING THINGS: 0

## 2022-08-22 ASSESSMENT — LIFESTYLE VARIABLES
HOW OFTEN DO YOU HAVE A DRINK CONTAINING ALCOHOL: MONTHLY OR LESS
HOW MANY STANDARD DRINKS CONTAINING ALCOHOL DO YOU HAVE ON A TYPICAL DAY: 1 OR 2

## 2022-08-22 NOTE — PROGRESS NOTES
Medicare Annual Wellness Visit    Francheska Nieves is here for Medicare AWV    Assessment & Plan   Medicare annual wellness visit, subsequent    Recommendations for Preventive Services Due: see orders and patient instructions/AVS.  Recommended screening schedule for the next 5-10 years is provided to the patient in written form: see Patient Instructions/AVS.     Return for Medicare Annual Wellness Visit in 1 year. Subjective   The following acute and/or chronic problems were also addressed today:  See below     Patient's complete Health Risk Assessment and screening values have been reviewed and are found in Flowsheets. The following problems were reviewed today and where indicated follow up appointments were made and/or referrals ordered. Positive Risk Factor Screenings with Interventions:                  No Positive Risk Factors identified today. Objective   Vitals:    08/22/22 1453   BP: 128/64   Pulse: 67   Temp: 97.5 °F (36.4 °C)   SpO2: 98%   Weight: 169 lb (76.7 kg)      Body mass index is 27.28 kg/m².       General Appearance: alert and oriented to person, place and time, well developed and well- nourished, in no acute distress  Skin: warm and dry, no rash or erythema  Head: normocephalic and atraumatic  Eyes: pupils equal, round, and reactive to light, extraocular eye movements intact, conjunctivae normal  ENT: tympanic membrane, external ear and ear canal normal bilaterally, nose without deformity, nasal mucosa and turbinates normal without polyps  Neck: supple and non-tender without mass, no thyromegaly or thyroid nodules, no cervical lymphadenopathy  Pulmonary/Chest: clear to auscultation bilaterally- no wheezes, rales or rhonchi, normal air movement, no respiratory distress  Cardiovascular: normal rate, regular rhythm, normal S1 and S2, no murmurs, rubs, clicks, or gallops, distal pulses intact, no carotid bruits  Abdomen: soft, non-tender, non-distended, normal bowel sounds, no masses or BEFORE MEALS  Krysten Waldrop MD   atorvastatin (LIPITOR) 80 MG tablet Take 80 mg by mouth daily   Historical Provider, MD   glucose monitoring kit (FREESTYLE) monitoring kit 1 kit by Does not apply route daily as needed (as directed)  Krysten Waldrop MD   blood glucose test strips (ASCENSIA AUTODISC VI;ONE TOUCH ULTRA TEST VI) strip 1 each by In Vitro route daily As needed. One Touch Ultra 2 Test strips DX: E11.9  Krysten Waldrop MD   Blood Glucose Calibration (OT ULTRA/FASTTK CNTRL SOLN) SOLN 1 Bottle by In Vitro route as needed (as directed)  Krysten Waldrop MD   docusate sodium (COLACE) 100 MG capsule Take 1 capsule by mouth daily as needed for Constipation  Krysten Waldrop MD   omeprazole (PRILOSEC) 20 MG delayed release capsule Take 20 mg by mouth daily PRN  Historical Provider, MD   nitroGLYCERIN (NITROSTAT) 0.4 MG SL tablet Place 1 tablet under the tongue every 5 minutes as needed for Chest pain  Madi Willis MD   aspirin 81 MG EC tablet Take 81 mg by mouth daily. Historical Provider, MD   vitamin B-12 (CYANOCOBALAMIN) 1000 MCG tablet Take 1,000 mcg by mouth daily. Historical Provider, MD   folic acid (FOLVITE) 137 MCG tablet Take 400 mcg by mouth daily   Historical Provider, MD Finch (Including outside providers/suppliers regularly involved in providing care):   Patient Care Team:  Richard Ruggiero DO as PCP - General (Internal Medicine)  Richard Ruggiero DO as PCP - [facility] Empaneled Provider  Crsitiane Lerma MD as Consulting Physician (Hematology and Oncology)  Madi Willis MD as Consulting Physician (Cardiology)     Reviewed and updated this visit:  Tobacco  Allergies  Meds  Med Hx  Surg Hx  Soc Hx  Fam Hx        He has been having bowel urgency. He has had some bouts of incontinence. He was started on Jardiance.

## 2022-08-22 NOTE — PATIENT INSTRUCTIONS
Personalized Preventive Plan for Elen Murillo - 8/22/2022  Medicare offers a range of preventive health benefits. Some of the tests and screenings are paid in full while other may be subject to a deductible, co-insurance, and/or copay. Some of these benefits include a comprehensive review of your medical history including lifestyle, illnesses that may run in your family, and various assessments and screenings as appropriate. After reviewing your medical record and screening and assessments performed today your provider may have ordered immunizations, labs, imaging, and/or referrals for you. A list of these orders (if applicable) as well as your Preventive Care list are included within your After Visit Summary for your review. Other Preventive Recommendations:    A preventive eye exam performed by an eye specialist is recommended every 1-2 years to screen for glaucoma; cataracts, macular degeneration, and other eye disorders. A preventive dental visit is recommended every 6 months. Try to get at least 150 minutes of exercise per week or 10,000 steps per day on a pedometer . Order or download the FREE \"Exercise & Physical Activity: Your Everyday Guide\" from [employer]. Call 4-646.955.7111 or search [employer] online. You need 8934-5994 mg of calcium and 5061-0192 IU of vitamin D per day. It is possible to meet your calcium requirement with diet alone, but a vitamin D supplement is usually necessary to meet this goal.  When exposed to the sun, use a sunscreen that protects against both UVA and UVB radiation with an SPF of 30 or greater. Reapply every 2 to 3 hours or after sweating, drying off with a towel, or swimming. Always wear a seat belt when traveling in a car. Always wear a helmet when riding a bicycle or motorcycle.

## 2022-08-23 DIAGNOSIS — E11.51 TYPE II DIABETES MELLITUS WITH PERIPHERAL CIRCULATORY DISORDER (HCC): ICD-10-CM

## 2022-08-23 LAB
ALBUMIN SERPL-MCNC: 5 G/DL (ref 3.5–5.2)
ALP BLD-CCNC: 45 U/L (ref 40–129)
ALT SERPL-CCNC: 16 U/L (ref 0–40)
ANION GAP SERPL CALCULATED.3IONS-SCNC: 19 MMOL/L (ref 7–16)
AST SERPL-CCNC: 25 U/L (ref 0–39)
BILIRUB SERPL-MCNC: 0.3 MG/DL (ref 0–1.2)
BUN BLDV-MCNC: 21 MG/DL (ref 6–23)
CALCIUM SERPL-MCNC: 9.9 MG/DL (ref 8.6–10.2)
CHLORIDE BLD-SCNC: 102 MMOL/L (ref 98–107)
CHOLESTEROL, TOTAL: 74 MG/DL (ref 0–199)
CO2: 19 MMOL/L (ref 22–29)
CREAT SERPL-MCNC: 1.4 MG/DL (ref 0.7–1.2)
CREATININE URINE: 75 MG/DL (ref 40–278)
GFR AFRICAN AMERICAN: 58
GFR NON-AFRICAN AMERICAN: 48 ML/MIN/1.73
GLUCOSE BLD-MCNC: 92 MG/DL (ref 74–99)
HBA1C MFR BLD: 6 % (ref 4–5.6)
HCT VFR BLD CALC: 38.6 % (ref 37–54)
HDLC SERPL-MCNC: 32 MG/DL
HEMOGLOBIN: 11.8 G/DL (ref 12.5–16.5)
LDL CHOLESTEROL CALCULATED: 24 MG/DL (ref 0–99)
MCH RBC QN AUTO: 27.7 PG (ref 26–35)
MCHC RBC AUTO-ENTMCNC: 30.6 % (ref 32–34.5)
MCV RBC AUTO: 90.6 FL (ref 80–99.9)
MICROALBUMIN UR-MCNC: 15.5 MG/L
MICROALBUMIN/CREAT UR-RTO: 20.7 (ref 0–30)
PDW BLD-RTO: 16.1 FL (ref 11.5–15)
PLATELET # BLD: 433 E9/L (ref 130–450)
PMV BLD AUTO: 12.8 FL (ref 7–12)
POTASSIUM SERPL-SCNC: 5 MMOL/L (ref 3.5–5)
RBC # BLD: 4.26 E12/L (ref 3.8–5.8)
SODIUM BLD-SCNC: 140 MMOL/L (ref 132–146)
TOTAL PROTEIN: 9.2 G/DL (ref 6.4–8.3)
TRIGL SERPL-MCNC: 91 MG/DL (ref 0–149)
VLDLC SERPL CALC-MCNC: 18 MG/DL
WBC # BLD: 16.3 E9/L (ref 4.5–11.5)

## 2022-09-17 LAB — DIABETIC RETINOPATHY: NEGATIVE

## 2022-10-11 LAB
ALBUMIN SERPL-MCNC: 4.4 G/DL (ref 3.5–5.2)
ALP BLD-CCNC: 42 U/L (ref 40–129)
ALT SERPL-CCNC: 15 U/L (ref 0–40)
ANION GAP SERPL CALCULATED.3IONS-SCNC: 13 MMOL/L (ref 7–16)
AST SERPL-CCNC: 26 U/L (ref 0–39)
BILIRUB SERPL-MCNC: 0.3 MG/DL (ref 0–1.2)
BUN BLDV-MCNC: 22 MG/DL (ref 6–23)
CALCIUM SERPL-MCNC: 9.2 MG/DL (ref 8.6–10.2)
CHLORIDE BLD-SCNC: 100 MMOL/L (ref 98–107)
CO2: 22 MMOL/L (ref 22–29)
CREAT SERPL-MCNC: 1.4 MG/DL (ref 0.7–1.2)
GFR AFRICAN AMERICAN: 58
GFR NON-AFRICAN AMERICAN: 48 ML/MIN/1.73
GLUCOSE BLD-MCNC: 101 MG/DL (ref 74–99)
HCT VFR BLD CALC: 39 % (ref 37–54)
HEMOGLOBIN: 11.7 G/DL (ref 12.5–16.5)
MCH RBC QN AUTO: 28.3 PG (ref 26–35)
MCHC RBC AUTO-ENTMCNC: 30 % (ref 32–34.5)
MCV RBC AUTO: 94.4 FL (ref 80–99.9)
PDW BLD-RTO: 16.8 FL (ref 11.5–15)
PHOSPHORUS: 3.9 MG/DL (ref 2.5–4.5)
PLATELET # BLD: 447 E9/L (ref 130–450)
PMV BLD AUTO: 13 FL (ref 7–12)
POTASSIUM SERPL-SCNC: 4.9 MMOL/L (ref 3.5–5)
RBC # BLD: 4.13 E12/L (ref 3.8–5.8)
SODIUM BLD-SCNC: 135 MMOL/L (ref 132–146)
TOTAL PROTEIN: 9.1 G/DL (ref 6.4–8.3)
WBC # BLD: 16.8 E9/L (ref 4.5–11.5)

## 2022-10-12 LAB
PARATHYROID HORMONE INTACT: 34 PG/ML (ref 15–65)
VITAMIN D 25-HYDROXY: 26 NG/ML (ref 30–100)

## 2022-11-23 ENCOUNTER — OFFICE VISIT (OUTPATIENT)
Dept: CARDIOLOGY CLINIC | Age: 87
End: 2022-11-23
Payer: MEDICARE

## 2022-11-23 VITALS
RESPIRATION RATE: 18 BRPM | BODY MASS INDEX: 26.87 KG/M2 | DIASTOLIC BLOOD PRESSURE: 60 MMHG | WEIGHT: 167.2 LBS | HEIGHT: 66 IN | HEART RATE: 71 BPM | SYSTOLIC BLOOD PRESSURE: 104 MMHG

## 2022-11-23 DIAGNOSIS — I25.10 CORONARY ARTERY DISEASE INVOLVING NATIVE CORONARY ARTERY OF NATIVE HEART WITHOUT ANGINA PECTORIS: ICD-10-CM

## 2022-11-23 DIAGNOSIS — I10 ESSENTIAL HYPERTENSION: ICD-10-CM

## 2022-11-23 DIAGNOSIS — G47.00 INSOMNIA, UNSPECIFIED TYPE: ICD-10-CM

## 2022-11-23 DIAGNOSIS — F41.9 ANXIETY: ICD-10-CM

## 2022-11-23 DIAGNOSIS — R06.09 DOE (DYSPNEA ON EXERTION): Primary | ICD-10-CM

## 2022-11-23 PROCEDURE — G8427 DOCREV CUR MEDS BY ELIG CLIN: HCPCS | Performed by: INTERNAL MEDICINE

## 2022-11-23 PROCEDURE — 93000 ELECTROCARDIOGRAM COMPLETE: CPT | Performed by: INTERNAL MEDICINE

## 2022-11-23 PROCEDURE — 1036F TOBACCO NON-USER: CPT | Performed by: INTERNAL MEDICINE

## 2022-11-23 PROCEDURE — 1123F ACP DISCUSS/DSCN MKR DOCD: CPT | Performed by: INTERNAL MEDICINE

## 2022-11-23 PROCEDURE — 3078F DIAST BP <80 MM HG: CPT | Performed by: INTERNAL MEDICINE

## 2022-11-23 PROCEDURE — 3074F SYST BP LT 130 MM HG: CPT | Performed by: INTERNAL MEDICINE

## 2022-11-23 PROCEDURE — G8484 FLU IMMUNIZE NO ADMIN: HCPCS | Performed by: INTERNAL MEDICINE

## 2022-11-23 PROCEDURE — 99214 OFFICE O/P EST MOD 30 MIN: CPT | Performed by: INTERNAL MEDICINE

## 2022-11-23 PROCEDURE — G8417 CALC BMI ABV UP PARAM F/U: HCPCS | Performed by: INTERNAL MEDICINE

## 2022-11-23 RX ORDER — LORAZEPAM 1 MG/1
1 TABLET ORAL DAILY PRN
Qty: 15 TABLET | Refills: 4 | Status: SHIPPED | OUTPATIENT
Start: 2022-11-23 | End: 2023-11-18

## 2022-11-23 RX ORDER — ZOLPIDEM TARTRATE 5 MG/1
5 TABLET ORAL NIGHTLY PRN
Qty: 10 TABLET | Refills: 3 | Status: SHIPPED | OUTPATIENT
Start: 2022-11-23 | End: 2023-11-23

## 2022-11-23 NOTE — PROGRESS NOTES
OFFICE VISIT     PRIMARY CARE PHYSICIAN:      Cathlean Duane, DO       ALLERGIES / SENSITIVITIES:        Allergies   Allergen Reactions    Iodine Rash     IV Iodine    Benadryl [Diphenhydramine]      IV BENADRYL     Ramipril      cough          REVIEWED MEDICATIONS:        Current Outpatient Medications:     LORazepam (ATIVAN) 1 MG tablet, Take 1 tablet by mouth daily as needed for Anxiety (For anxiety and insomnia) for up to 360 days. , Disp: 15 tablet, Rfl: 4    zolpidem (AMBIEN) 5 MG tablet, Take 1 tablet by mouth nightly as needed for Sleep., Disp: 10 tablet, Rfl: 3    Handicap Placard MISC, by Does not apply route Dx - Gait instability Duration - 8/27, Disp: 1 each, Rfl: 0    alirocumab (PRALUENT) 75 MG/ML SOAJ injection pen, Inject 1 mL into the skin every 14 days, Disp: 2.24 mL, Rfl: 6    empagliflozin (JARDIANCE) 10 MG tablet, Take 1 tablet by mouth daily, Disp: 30 tablet, Rfl: 6    isosorbide mononitrate (IMDUR) 30 MG extended release tablet, TAKE ONE TABLET BY MOUTH DAILY, Disp: 90 tablet, Rfl: 3    allopurinol (ZYLOPRIM) 300 MG tablet, TAKE ONE TABLET BY MOUTH EVERY DAY, Disp: 90 tablet, Rfl: 3    carvedilol (COREG) 3.125 MG tablet, TAKE ONE TABLET BY MOUTH TWO TIMES A DAY, Disp: 180 tablet, Rfl: 0    vitamin D (ERGOCALCIFEROL) 1.25 MG (33199 UT) CAPS capsule, Take 50,000 Units by mouth every 30 days, Disp: , Rfl:     patiromer sorbitex calcium (VELTASSA) 8.4 g PACK packet, Take 8.4 g by mouth every other day, Disp: , Rfl:     sodium bicarbonate 650 MG tablet, Take 650 mg by mouth 2 times daily, Disp: , Rfl:     tamsulosin (FLOMAX) 0.4 MG capsule, Take 0.4 mg by mouth daily, Disp: , Rfl:     ezetimibe (ZETIA) 10 MG tablet, Take 1 tablet by mouth daily, Disp: 30 tablet, Rfl: 6    metFORMIN (GLUCOPHAGE) 1000 MG tablet, Take 1 tablet by mouth 2 times daily (with meals), Disp: 180 tablet, Rfl: 3    nateglinide (STARLIX) 120 MG tablet, TAKE ONE TABLET BY MOUTH THREE TIMES A DAY BEFORE MEALS, Disp: 270 tablet, Rfl: 1    atorvastatin (LIPITOR) 80 MG tablet, Take 80 mg by mouth daily , Disp: , Rfl:     glucose monitoring kit (FREESTYLE) monitoring kit, 1 kit by Does not apply route daily as needed (as directed), Disp: 1 kit, Rfl: 0    blood glucose test strips (ASCENSIA AUTODISC VI;ONE TOUCH ULTRA TEST VI) strip, 1 each by In Vitro route daily As needed. One Touch Ultra 2 Test strips DX: E11.9, Disp: 100 each, Rfl: 3    Blood Glucose Calibration (OT ULTRA/FASTTK CNTRL SOLN) SOLN, 1 Bottle by In Vitro route as needed (as directed), Disp: 1 each, Rfl: 3    docusate sodium (COLACE) 100 MG capsule, Take 1 capsule by mouth daily as needed for Constipation, Disp: , Rfl:     omeprazole (PRILOSEC) 20 MG delayed release capsule, Take 20 mg by mouth daily PRN, Disp: , Rfl:     nitroGLYCERIN (NITROSTAT) 0.4 MG SL tablet, Place 1 tablet under the tongue every 5 minutes as needed for Chest pain, Disp: 25 tablet, Rfl: 3    aspirin 81 MG EC tablet, Take 81 mg by mouth daily. , Disp: , Rfl:     vitamin B-12 (CYANOCOBALAMIN) 1000 MCG tablet, Take 1,000 mcg by mouth daily. , Disp: , Rfl:     folic acid (FOLVITE) 768 MCG tablet, Take 400 mcg by mouth daily , Disp: , Rfl:       S: REASON FOR VISIT:       Chief Complaint   Patient presents with    Coronary Artery Disease     Complains of SOBOE. 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 No palpitations, dizzy or syncope.    Active at home - He does work out regularly   Try to watch diet          Past Medical History:   Diagnosis Date    Anal fissure 10/27/2016    Anemia     Arthritis     CAD (coronary artery disease)     Diabetes mellitus ([facility] [address] ADULT ONSET    GERD (gastroesophageal reflux disease) 10/27/2016    Gout     History of blood transfusion     Hyperkalemia     Hyperlipidemia     Low back pain 07/21/2017    Low grade myelodysplastic syndrome lesions ([address] 06/13/2017    OA (osteoarthritis) 10/27/2016    Pain of left lower extremity 07/21/2017    Rectal fissure 1987            Past Surgical History:   Procedure Laterality Date    CHOLECYSTECTOMY, LAPAROSCOPIC  04/10/2018    Dr Gabino Severin  01/16/89    PTCA/PROXIMAL LAD, PTCA MID LAD ([facility])    CORONARY ANGIOPLASTY  04/12/89    REPEAT PTCA TO MID LAD ([facility])    CORONARY ANGIOPLASTY  09/22/11    PTCA/ROTATIONAL ATHERECTOMY & DOUG TO MID LAD, PTCA TO OSTIUM OF DIAGONAL BRANCH THROUGH STENTED SEGMENT IN  LAD. CORONARY ANGIOPLASTY  10/19/11    PTCA/DOUG TO PROXIMAL RCA (DR. YOUNGER)    CORONARY ANGIOPLASTY WITH STENT PLACEMENT  12/3/15    Dr. Olivier Sellers 3.0x15 RCA    DIAGNOSTIC CARDIAC CATH LAB PROCEDURE  01/11/89    @[facility]    DIAGNOSTIC CARDIAC CATH LAB PROCEDURE  01/16/89    @[facility]    DIAGNOSTIC CARDIAC CATH LAB PROCEDURE  09/22/11    ENDOSCOPY, COLON, DIAGNOSTIC      JOINT REPLACEMENT  1999    KNEE    LUMBAR LAMINECTOMY  09/08/2017    lumbar laminectomy  [id number]    NM TUMOR LOCALIZATION SPECT MULTI DAY  08/15/11    CTA CORONARIES:  PROXIMAL TO MID LAD STENOSIS, PROXIMAL AND DISTAL RCA STENOSIS, CALCIUM SCORE 2594.     TOTAL KNEE ARTHROPLASTY  10/99    BILATERAL    UPPER GASTROINTESTINAL ENDOSCOPY  09/26/2017    with colonoscopy          Family History   Problem Relation Age of Onset    Heart Disease Brother     Diabetes Brother     Parkinsonism Brother           Social History     Tobacco Use    Smoking status: Never    Smokeless tobacco: Never   Substance Use Topics    Alcohol use: Yes     Comment: rare mixed drink          Review of Systems:    Constitutional: negative for fever and chills, or significant weight loss  HEENT: negative for acute visual symptoms or auditory problems, no dysphagia  Respiratory: negative for cough, wheezing, or hemoptysis  Cardiovascular: negative for chest pain, palpitations, and +ve dyspnea  Gastrointestinal: negative for abdominal pain, diarrhea, melena, nausea and vomiting  Endocrine: Negative for polyuria and polydyspsia  Genitourinary: negative for dysuria and hematuria  Derm: negative for rash and skin lesion(s)  Neurological: negative for tingling, numbness, weakness, seizures  Endocrine: negative for polydipsia and polyuria  Musculoskeletal: negative for pain or tenderness  Psychiatric: negative for anxiety, depression, or suicidal ideations         O:  COMPLETE PHYSICAL EXAM:       /60 (Site: Left Upper Arm, Position: Sitting, Cuff Size: Medium Adult)   Pulse 71   Resp 18   Ht 5' 6\" (1.676 m)   Wt 167 lb 3.2 oz (75.8 kg)   BMI 26.99 kg/m²       General:   Patient alert, comfortable, no distress. Appears stated age. HEENT:    Pupils equal, no icterus; Tongue moist.   Neck:              No masses, Thyroid not palpable. No elevated JVD, No carotid bruit. Chest:   Normal configuration, non tender. Lungs:   Clear to auscultation bilaterally except few scattered rhonchi. Cardiovascular:  Regular rhythm, 1/6 systolic murmur, No S3, no palpable thrills. Abdomen:  Soft, Bowel sounds normal, no pulsatile abdominal aorta, no palpable masses. Extremities:  No edema. Distal pulses palpable. No cyanosis, no clubbing. Skin:   Good turgor, warm and dry, no cyanosis. Musculoskeletal: No joint swelling or deformity. Neuro:   Cranial nerves grossly intact; No focal neurologic deficit. Psych:   Alert, good mood and effect. REVIEW OF DIAGNOSTIC TESTS:        Electrocardiogram: Reviewed   Labs: Noted 10/2022    Echo 7/29/2022   Conclusions    Summary   Limited Echo for LV function. Normal left ventricular chamber size. Normal left ventricular systolic function, EF 51%. Mild left ventricular concentric hypertrophy noted. Stage I diastolic dysfunction. Left atrium borderline enlarged. Interatrial septum not well visualized but appears intact. Normal right ventricle size and function. Mild mitral annular calcification. There is trace mitral regurgitation. No mitral valve prolapse. The aortic valve appears mildly sclerotic. No hemodynamically significant aortic stenosis - mean gradient 7.5mmHg. Normal aortic root size. No evidence of pericardial effusion. No intra cardiac mass or thrombus. Compared to prior echo from 9/11/2017, MR is less. ASSESSMENT / PLAN:    Delmy Lewis was seen today for coronary artery disease. Diagnoses and all orders for this visit:    Coronary artery disease of native coronaries without angina; On Aspirin, Coreg, Altace, and Statin + PCSK9 (added due to LDL 81 on maximal Statins/Zetia). Add Jardiance due to CAD and diabetes. Side effects franklin. UTIs discussed, agreeable. -     EKG 12 Lead      - S/p PTCA of Proximal and Mid LAD in Jan 1989 at [facility].   - S/p Repeat PTCA of LAD in April 1989 at [facility].  - S/p Rotational atherectomy and 2.25-mm/16-mm Scimed Ion DOUG to Mid LAD, Lengthy procedure and also PTCA of ostial Diagonal branch through LAD stent - Dr Jose Avila at [place] [address] on 9/22/2011  - S/p 3.0-mm/28-mm Xience Abbott DOUG to proximal RCA. Also there is 50% Mid RCA, 50% distal RCA, Prox ISA 20% stenosis, Small caliber superior branch of PDA ostial 70%, larger branch of PDA ostial 50% stenosis - Dr Jose Avila at [facility] on 10/9/2011.  - Coronary CTA 7/20//2015 due to [facility] equivalent; showed high grade RCA stenosis; Sub-optimal study due to calcified coronaries.  - S/p 3.5-mm x 18-mm Alpine-RX Abbott DOUG to distal RCA. Also there is Prox LAD 50%; Mild LAD two areas-mid and distal edge in-stent restenosis, 70% OM1 stenosis; calcified coronaries, Dr Jose Avila, at [place] [address] on 12/3/2015.         Hx of IVP contrast-Iodine allergy, He had no issues with premedicating (Steroids, Benadryl +/- H2 blockers) prior to contrast procedures. PAD (peripheral artery disease) ([facility] [address] Absent DP pulses on both feet; Had doppler studies done in 9101     LV diastolic dysfunction, stable; EF 60-65% by cardiac cath in 12/2015 and Echo in 9/2017. SOBOE (shortness of breath on exertion) - Chronic, unchanged - Multifactorial - no acute CHF     Chronic intermittent dizzy/unsteadiness  - Monitor BP, avoid dehydration     Hx of Hyperkalemia - Dr Whitley Coffman monitoring     Mitral regurgitation, Mild, by 9/2017 Echo - Trace MR by Echo 7/2022     Obesity, non-morbid: Diet, exercise and weight loss discussed. 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 Others:  Anxiety  -     LORazepam (ATIVAN) 1 MG tablet; Take 1 tablet by mouth daily as needed for Anxiety (For anxiety and insomnia) for up to 360 days. Insomnia, unspecified type  -     LORazepam (ATIVAN) 1 MG tablet; Take 1 tablet by mouth daily as needed for Anxiety (For anxiety and insomnia) for up to 360 days. -     zolpidem (AMBIEN) 5 MG tablet; Take 1 tablet by mouth nightly as needed for Sleep. Echo results and above recommendations discussed him. The patient's current medication list, allergies, problem list and results of prior tests (as available) were reviewed at today's visit   All questions answered about cardiac diagnoses and cardiac medications. Continue current medications. Monitor BP and heart rates.               Compliance with medications and f/u with all physicians discussed. Risk factor modification based on risk profile discussed. Call if any exertional chest pain, short of breath, dizzy or palpitations. Follow up in 6 months or earlier if needed.          [facility]  [address], [place], [address] [place] [address]  [phone]

## 2022-12-02 ENCOUNTER — OFFICE VISIT (OUTPATIENT)
Dept: FAMILY MEDICINE CLINIC | Age: 87
End: 2022-12-02

## 2022-12-02 VITALS
SYSTOLIC BLOOD PRESSURE: 120 MMHG | HEART RATE: 90 BPM | BODY MASS INDEX: 27.5 KG/M2 | WEIGHT: 170.4 LBS | DIASTOLIC BLOOD PRESSURE: 64 MMHG | TEMPERATURE: 97.9 F | OXYGEN SATURATION: 97 %

## 2022-12-02 DIAGNOSIS — R05.9 COUGH, UNSPECIFIED TYPE: ICD-10-CM

## 2022-12-02 DIAGNOSIS — J10.1 INFLUENZA A: Primary | ICD-10-CM

## 2022-12-02 LAB
INFLUENZA A ANTIBODY: POSITIVE
INFLUENZA B ANTIBODY: NEGATIVE
Lab: NORMAL
PERFORMING INSTRUMENT: NORMAL
QC PASS/FAIL: NORMAL
SARS-COV-2, POC: NORMAL

## 2022-12-02 RX ORDER — OSELTAMIVIR PHOSPHATE 30 MG/1
30 CAPSULE ORAL 2 TIMES DAILY
Qty: 10 CAPSULE | Refills: 0 | Status: SHIPPED | OUTPATIENT
Start: 2022-12-02 | End: 2022-12-07

## 2022-12-02 RX ORDER — BENZONATATE 100 MG/1
100 CAPSULE ORAL 3 TIMES DAILY PRN
Qty: 21 CAPSULE | Refills: 0 | Status: SHIPPED | OUTPATIENT
Start: 2022-12-02 | End: 2022-12-09

## 2022-12-02 NOTE — PROGRESS NOTES
22  Obdulio Roberts : 1935 Sex: male  Age 82 y.o. Subjective:  Chief Complaint   Patient presents with    Cough     Started yesterday    Fever    Shortness of Breath         HPI:   Obdulio Roberts , 82 y.o. male presents to express care for evaluation of cough, congestion, drainage, fever, shortness of breath    HPI    72-year-old male with a history of CAD, diabetes, GERD, gout, hypokalemia, hyperlipidemia presents to express care for evaluation of cough, congestion, drainage, fever and shortness of breath. The patient has had the symptoms ongoing for last 2 days. The patient is not having any chest pain, shortness of breath. He has noted low-grade temperatures. Is not currently on any antibiotics. The patient has had COVID-vaccine. The patient has had booster. The patient did have flu vaccine this year. ROS:   Unless otherwise stated in this report the patient's positive and negative responses for review of systems for constitutional, eyes, ENT, cardiovascular, respiratory, gastrointestinal, neurological, , musculoskeletal, and integument systems and related systems to the presenting problem are either stated in the history of present illness or were not pertinent or were negative for the symptoms and/or complaints related to the presenting medical problem. Positives and pertinent negatives as per HPI. All others reviewed and are negative.       PMH:     Past Medical History:   Diagnosis Date    Anal fissure 10/27/2016    Anemia     Arthritis     CAD (coronary artery disease)     Diabetes mellitus ([address]     ADULT ONSET    GERD (gastroesophageal reflux disease) 10/27/2016    Gout     History of blood transfusion     Hyperkalemia     Hyperlipidemia     Low back pain 2017    Low grade myelodysplastic syndrome lesions ([address] 2017    OA (osteoarthritis) 10/27/2016    Pain of left lower extremity 2017    Rectal fissure        Past Surgical History:   Procedure Laterality Date    CHOLECYSTECTOMY, LAPAROSCOPIC  04/10/2018    Dr Jory Yusuf  01/16/89    PTCA/PROXIMAL LAD, PTCA MID LAD ([facility])    CORONARY ANGIOPLASTY  04/12/89    REPEAT PTCA TO MID LAD ([facility])    CORONARY ANGIOPLASTY  09/22/11    PTCA/ROTATIONAL ATHERECTOMY & DOUG TO MID LAD, PTCA TO OSTIUM OF DIAGONAL BRANCH THROUGH STENTED SEGMENT IN  LAD. CORONARY ANGIOPLASTY  10/19/11    PTCA/DOUG TO PROXIMAL RCA (DR. YOUNGER)    CORONARY ANGIOPLASTY WITH STENT PLACEMENT  12/3/15    Dr. Mcconnell Keepers 3.0x15 RCA    DIAGNOSTIC CARDIAC CATH LAB PROCEDURE  01/11/89    @[facility]    DIAGNOSTIC CARDIAC CATH LAB PROCEDURE  01/16/89    @[facility]    DIAGNOSTIC CARDIAC CATH LAB PROCEDURE  09/22/11    ENDOSCOPY, COLON, DIAGNOSTIC      JOINT REPLACEMENT  1999    KNEE    LUMBAR LAMINECTOMY  09/08/2017    lumbar laminectomy  [id number]    NM TUMOR LOCALIZATION SPECT MULTI DAY  08/15/11    CTA CORONARIES:  PROXIMAL TO MID LAD STENOSIS, PROXIMAL AND DISTAL RCA STENOSIS, CALCIUM SCORE 2594. TOTAL KNEE ARTHROPLASTY  10/99    BILATERAL    UPPER GASTROINTESTINAL ENDOSCOPY  09/26/2017    with colonoscopy       Family History   Problem Relation Age of Onset    Heart Disease Brother     Diabetes Brother     Parkinsonism Brother        Medications:     Current Outpatient Medications:     oseltamivir (TAMIFLU) 30 MG capsule, Take 1 capsule by mouth 2 times daily for 5 days, Disp: 10 capsule, Rfl: 0    benzonatate (TESSALON) 100 MG capsule, Take 1 capsule by mouth 3 times daily as needed for Cough, Disp: 21 capsule, Rfl: 0    LORazepam (ATIVAN) 1 MG tablet, Take 1 tablet by mouth daily as needed for Anxiety (For anxiety and insomnia) for up to 360 days. , Disp: 15 tablet, Rfl: 4    zolpidem (AMBIEN) 5 MG tablet, Take 1 tablet by mouth nightly as needed for Sleep., Disp: 10 tablet, Rfl: 3    Handicap Placard MISC, by Does not apply route Dx - Gait instability Duration - 8/27, Disp: 1 each, Rfl: 0    alirocumab (PRALUENT) 75 MG/ML SOAJ injection pen, Inject 1 mL into the skin every 14 days, Disp: 2.24 mL, Rfl: 6    empagliflozin (JARDIANCE) 10 MG tablet, Take 1 tablet by mouth daily, Disp: 30 tablet, Rfl: 6    isosorbide mononitrate (IMDUR) 30 MG extended release tablet, TAKE ONE TABLET BY MOUTH DAILY, Disp: 90 tablet, Rfl: 3    allopurinol (ZYLOPRIM) 300 MG tablet, TAKE ONE TABLET BY MOUTH EVERY DAY, Disp: 90 tablet, Rfl: 3    carvedilol (COREG) 3.125 MG tablet, TAKE ONE TABLET BY MOUTH TWO TIMES A DAY, Disp: 180 tablet, Rfl: 0    vitamin D (ERGOCALCIFEROL) 1.25 MG (85796 UT) CAPS capsule, Take 50,000 Units by mouth every 30 days, Disp: , Rfl:     patiromer sorbitex calcium (VELTASSA) 8.4 g PACK packet, Take 8.4 g by mouth every other day, Disp: , Rfl:     sodium bicarbonate 650 MG tablet, Take 650 mg by mouth 2 times daily, Disp: , Rfl:     tamsulosin (FLOMAX) 0.4 MG capsule, Take 0.4 mg by mouth daily, Disp: , Rfl:     ezetimibe (ZETIA) 10 MG tablet, Take 1 tablet by mouth daily, Disp: 30 tablet, Rfl: 6    metFORMIN (GLUCOPHAGE) 1000 MG tablet, Take 1 tablet by mouth 2 times daily (with meals), Disp: 180 tablet, Rfl: 3    nateglinide (STARLIX) 120 MG tablet, TAKE ONE TABLET BY MOUTH THREE TIMES A DAY BEFORE MEALS, Disp: 270 tablet, Rfl: 1    atorvastatin (LIPITOR) 80 MG tablet, Take 80 mg by mouth daily , Disp: , Rfl:     glucose monitoring kit (FREESTYLE) monitoring kit, 1 kit by Does not apply route daily as needed (as directed), Disp: 1 kit, Rfl: 0    blood glucose test strips (ASCENSIA AUTODISC VI;ONE TOUCH ULTRA TEST VI) strip, 1 each by In Vitro route daily As needed.  One Touch Ultra 2 Test strips DX: E11.9, Disp: 100 each, Rfl: 3    Blood Glucose Calibration (OT ULTRA/FASTTK CNTRL SOLN) SOLN, 1 Bottle by In Vitro route as needed (as directed), Disp: 1 each, Rfl: 3    docusate sodium (COLACE) 100 MG capsule, Take 1 capsule by mouth daily as needed for Constipation, Disp: , Rfl:     omeprazole (PRILOSEC) 20 MG delayed release capsule, Take 20 mg by mouth daily PRN, Disp: , Rfl:     nitroGLYCERIN (NITROSTAT) 0.4 MG SL tablet, Place 1 tablet under the tongue every 5 minutes as needed for Chest pain, Disp: 25 tablet, Rfl: 3    aspirin 81 MG EC tablet, Take 81 mg by mouth daily. , Disp: , Rfl:     vitamin B-12 (CYANOCOBALAMIN) 1000 MCG tablet, Take 1,000 mcg by mouth daily. , Disp: , Rfl:     folic acid (FOLVITE) 310 MCG tablet, Take 400 mcg by mouth daily , Disp: , Rfl:     Allergies: Allergies   Allergen Reactions    Iodine Rash     IV Iodine    Benadryl [Diphenhydramine]      IV BENADRYL     Ramipril      cough       Social History:     Social History     Tobacco Use    Smoking status: Never    Smokeless tobacco: Never   Vaping Use    Vaping Use: Never used   Substance Use Topics    Alcohol use: Yes     Comment: rare mixed drink     Drug use: Never       Patient lives at home. Physical Exam:     Vitals:    12/02/22 1126   BP: 120/64   Site: Right Upper Arm   Position: Sitting   Pulse: 90   Temp: 97.9 °F (36.6 °C)   TempSrc: Temporal   SpO2: 97%   Weight: 170 lb 6.4 oz (77.3 kg)       Exam:  Physical Exam  Nurse's notes and vital signs reviewed. The patient is not hypoxic. ? General: Alert, no acute distress, patient resting comfortably Patient is not toxic or lethargic. Skin: Warm, intact, no pallor noted. There is no evidence of rash at this time. Head: Normocephalic, atraumatic  Eye: Normal conjunctiva  Ears, Nose, Throat: Right tympanic membrane clear, left tympanic membrane clear. No drainage or discharge noted. No pre- or post-auricular tenderness, erythema, or swelling noted. Mild nasal congestion, clear rhinorrhea  Posterior oropharynx shows no erythema, tonsillar hypertrophy, or exudate. the uvula is midline. No trismus or drooling is noted. Moist mucous membranes. Neck: No anterior/posterior lymphadenopathy noted. No erythema, no masses, no fluctuance or induration noted. No meningeal signs.   Cardiovascular: Regular Rate and Rhythm  Respiratory: No acute distress, no rhonchi, wheezing or crackles noted. No stridor or retractions are noted. Neurological: A&O x4, normal speech  Psychiatric: Cooperative       Testing:     Results for orders placed or performed in visit on 12/02/22   POCT COVID-19, Antigen   Result Value Ref Range    SARS-COV-2, POC Not-Detected Not Detected    Lot Number [id number]     QC Pass/Fail pass     Performing Instrument BD Veritor    POCT Influenza A/B   Result Value Ref Range    Influenza A Ab Positive (A)     Influenza B Ab Negative            Medical Decision Making:     Vital signs reviewed    Past medical history reviewed. Allergies reviewed. Medications reviewed. Patient on arrival does not appear to be in any apparent distress or discomfort. The patient has been seen and evaluated. The patient does not appear to be toxic or lethargic. The patient had influenza and COVID obtained. COVID-negative    Influenza positive. Influenza B-    The patient will be treated with Tamiflu, Tessalon Perles. The patient is to push fluids get plenty of rest.  The patient is to follow-up with PCP. Monitor pulse ox. Use vitamins and hydrate. The patient does not appear to be toxic or lethargic. The patient does appear well. The patient was educated on the proper dosage of motrin and tylenol and the appropriate intervals of each. The patient is to increase fluid intake over the next several days. The patient is to use OTC decongestant as needed. The patient is to return to express care or go directly to the emergency department should any of the signs or symptoms worsen. The patient is to followup with primary care physician in 2-3 days for repeat evaluation. The patient has no other questions or concerns at this time the patient will be discharged home. Clinical Impression:   Aubree Bueno was seen today for cough, fever and shortness of breath.     Diagnoses and all orders for this visit:    Influenza A    Cough, unspecified type  -     POCT COVID-19, Antigen  -     POCT Influenza A/B    Other orders  -     oseltamivir (TAMIFLU) 30 MG capsule; Take 1 capsule by mouth 2 times daily for 5 days  -     benzonatate (TESSALON) 100 MG capsule; Take 1 capsule by mouth 3 times daily as needed for Cough      The patient is to call for any concerns or return if any of the signs or symptoms worsen. The patient is to follow-up with PCP in the next 2-3 days for repeat evaluation repeat assessment or go directly to the emergency department.      SIGNATURE: Dorenda Phoenix III, PA-C

## 2022-12-03 ENCOUNTER — APPOINTMENT (OUTPATIENT)
Dept: CT IMAGING | Age: 87
DRG: 391 | End: 2022-12-03
Payer: MEDICARE

## 2022-12-03 ENCOUNTER — APPOINTMENT (OUTPATIENT)
Dept: GENERAL RADIOLOGY | Age: 87
DRG: 391 | End: 2022-12-03
Payer: MEDICARE

## 2022-12-03 ENCOUNTER — HOSPITAL ENCOUNTER (INPATIENT)
Age: 87
LOS: 1 days | Discharge: HOME | DRG: 391 | End: 2022-12-04
Attending: EMERGENCY MEDICINE | Admitting: INTERNAL MEDICINE
Payer: MEDICARE

## 2022-12-03 DIAGNOSIS — E86.0 DEHYDRATION: ICD-10-CM

## 2022-12-03 DIAGNOSIS — R55 SYNCOPE AND COLLAPSE: Primary | ICD-10-CM

## 2022-12-03 DIAGNOSIS — J11.1 INFLUENZA: ICD-10-CM

## 2022-12-03 PROBLEM — J11.00 INFLUENZAL PNEUMONIA: Status: ACTIVE | Noted: 2022-12-03

## 2022-12-03 LAB
ALBUMIN SERPL-MCNC: 3.9 G/DL (ref 3.5–5.2)
ALP BLD-CCNC: 32 U/L (ref 40–129)
ALT SERPL-CCNC: 25 U/L (ref 0–40)
ANION GAP SERPL CALCULATED.3IONS-SCNC: 13 MMOL/L (ref 7–16)
ANISOCYTOSIS: ABNORMAL
APTT: 35.3 SEC (ref 24.5–35.1)
AST SERPL-CCNC: 39 U/L (ref 0–39)
BACTERIA: ABNORMAL /HPF
BASOPHILS ABSOLUTE: 0 E9/L (ref 0–0.2)
BASOPHILS RELATIVE PERCENT: 0.7 % (ref 0–2)
BILIRUB SERPL-MCNC: 0.4 MG/DL (ref 0–1.2)
BILIRUBIN URINE: NEGATIVE
BLOOD, URINE: ABNORMAL
BUN BLDV-MCNC: 30 MG/DL (ref 6–23)
BURR CELLS: ABNORMAL
CALCIUM SERPL-MCNC: 8.8 MG/DL (ref 8.6–10.2)
CHLORIDE BLD-SCNC: 99 MMOL/L (ref 98–107)
CHP ED QC CHECK: NORMAL
CLARITY: CLEAR
CO2: 22 MMOL/L (ref 22–29)
COLOR: YELLOW
CREAT SERPL-MCNC: 1.9 MG/DL (ref 0.7–1.2)
EOSINOPHILS ABSOLUTE: 0 E9/L (ref 0.05–0.5)
EOSINOPHILS RELATIVE PERCENT: 0.1 % (ref 0–6)
GFR SERPL CREATININE-BSD FRML MDRD: 34 ML/MIN/1.73
GLUCOSE BLD-MCNC: 162 MG/DL
GLUCOSE BLD-MCNC: 162 MG/DL (ref 74–99)
GLUCOSE URINE: >=1000 MG/DL
HCT VFR BLD CALC: 34.9 % (ref 37–54)
HEMOGLOBIN: 10.9 G/DL (ref 12.5–16.5)
INR BLD: 1.4
KETONES, URINE: NEGATIVE MG/DL
LACTIC ACID: 2.5 MMOL/L (ref 0.5–2.2)
LACTIC ACID: 3.1 MMOL/L (ref 0.5–2.2)
LEUKOCYTE ESTERASE, URINE: NEGATIVE
LYMPHOCYTES ABSOLUTE: 2.54 E9/L (ref 1.5–4)
LYMPHOCYTES RELATIVE PERCENT: 7.7 % (ref 20–42)
MCH RBC QN AUTO: 28.5 PG (ref 26–35)
MCHC RBC AUTO-ENTMCNC: 31.2 % (ref 32–34.5)
MCV RBC AUTO: 91.1 FL (ref 80–99.9)
METAMYELOCYTES RELATIVE PERCENT: 0.8 % (ref 0–1)
MONOCYTES ABSOLUTE: 12.36 E9/L (ref 0.1–0.95)
MONOCYTES RELATIVE PERCENT: 38.5 % (ref 2–12)
MYELOCYTE PERCENT: 3.4 % (ref 0–0)
NEUTROPHILS ABSOLUTE: 17.12 E9/L (ref 1.8–7.3)
NEUTROPHILS RELATIVE PERCENT: 49.6 % (ref 43–80)
NITRITE, URINE: NEGATIVE
PDW BLD-RTO: 15.9 FL (ref 11.5–15)
PH UA: 6 (ref 5–9)
PLATELET # BLD: 209 E9/L (ref 130–450)
PMV BLD AUTO: 14.5 FL (ref 7–12)
POIKILOCYTES: ABNORMAL
POLYCHROMASIA: ABNORMAL
POTASSIUM SERPL-SCNC: 4.6 MMOL/L (ref 3.5–5)
PROTEIN UA: ABNORMAL MG/DL
PROTHROMBIN TIME: 15.1 SEC (ref 9.3–12.4)
RBC # BLD: 3.83 E12/L (ref 3.8–5.8)
RBC UA: ABNORMAL /HPF (ref 0–2)
SODIUM BLD-SCNC: 134 MMOL/L (ref 132–146)
SPECIFIC GRAVITY UA: 1.01 (ref 1–1.03)
TARGET CELLS: ABNORMAL
TOTAL CK: 98 U/L (ref 20–200)
TOTAL PROTEIN: 8.1 G/DL (ref 6.4–8.3)
TROPONIN, HIGH SENSITIVITY: 27 NG/L (ref 0–11)
UROBILINOGEN, URINE: 0.2 E.U./DL
WBC # BLD: 31.7 E9/L (ref 4.5–11.5)
WBC UA: ABNORMAL /HPF (ref 0–5)

## 2022-12-03 PROCEDURE — 72125 CT NECK SPINE W/O DYE: CPT

## 2022-12-03 PROCEDURE — 80053 COMPREHEN METABOLIC PANEL: CPT

## 2022-12-03 PROCEDURE — 74176 CT ABD & PELVIS W/O CONTRAST: CPT

## 2022-12-03 PROCEDURE — 93005 ELECTROCARDIOGRAM TRACING: CPT | Performed by: EMERGENCY MEDICINE

## 2022-12-03 PROCEDURE — G0378 HOSPITAL OBSERVATION PER HR: HCPCS

## 2022-12-03 PROCEDURE — 83605 ASSAY OF LACTIC ACID: CPT

## 2022-12-03 PROCEDURE — 2580000003 HC RX 258: Performed by: EMERGENCY MEDICINE

## 2022-12-03 PROCEDURE — 85025 COMPLETE CBC W/AUTO DIFF WBC: CPT

## 2022-12-03 PROCEDURE — 85610 PROTHROMBIN TIME: CPT

## 2022-12-03 PROCEDURE — 96360 HYDRATION IV INFUSION INIT: CPT

## 2022-12-03 PROCEDURE — 72131 CT LUMBAR SPINE W/O DYE: CPT

## 2022-12-03 PROCEDURE — 85730 THROMBOPLASTIN TIME PARTIAL: CPT

## 2022-12-03 PROCEDURE — 99285 EMERGENCY DEPT VISIT HI MDM: CPT

## 2022-12-03 PROCEDURE — 1200000000 HC SEMI PRIVATE

## 2022-12-03 PROCEDURE — 82550 ASSAY OF CK (CPK): CPT

## 2022-12-03 PROCEDURE — 6370000000 HC RX 637 (ALT 250 FOR IP): Performed by: INTERNAL MEDICINE

## 2022-12-03 PROCEDURE — 71045 X-RAY EXAM CHEST 1 VIEW: CPT

## 2022-12-03 PROCEDURE — 72128 CT CHEST SPINE W/O DYE: CPT

## 2022-12-03 PROCEDURE — 81001 URINALYSIS AUTO W/SCOPE: CPT

## 2022-12-03 PROCEDURE — 84484 ASSAY OF TROPONIN QUANT: CPT

## 2022-12-03 PROCEDURE — 70450 CT HEAD/BRAIN W/O DYE: CPT

## 2022-12-03 RX ORDER — 0.9 % SODIUM CHLORIDE 0.9 %
1000 INTRAVENOUS SOLUTION INTRAVENOUS ONCE
Status: COMPLETED | OUTPATIENT
Start: 2022-12-03 | End: 2022-12-03

## 2022-12-03 RX ORDER — SODIUM CHLORIDE 0.9 % (FLUSH) 0.9 %
5-40 SYRINGE (ML) INJECTION PRN
Status: DISCONTINUED | OUTPATIENT
Start: 2022-12-03 | End: 2022-12-04 | Stop reason: HOSPADM

## 2022-12-03 RX ORDER — ALLOPURINOL 300 MG/1
300 TABLET ORAL DAILY
Status: DISCONTINUED | OUTPATIENT
Start: 2022-12-03 | End: 2022-12-04 | Stop reason: HOSPADM

## 2022-12-03 RX ORDER — POLYETHYLENE GLYCOL 3350 17 G/17G
17 POWDER, FOR SOLUTION ORAL DAILY PRN
Status: DISCONTINUED | OUTPATIENT
Start: 2022-12-03 | End: 2022-12-04 | Stop reason: HOSPADM

## 2022-12-03 RX ORDER — SODIUM CHLORIDE 0.9 % (FLUSH) 0.9 %
5-40 SYRINGE (ML) INJECTION EVERY 12 HOURS SCHEDULED
Status: DISCONTINUED | OUTPATIENT
Start: 2022-12-03 | End: 2022-12-04 | Stop reason: HOSPADM

## 2022-12-03 RX ORDER — OSELTAMIVIR PHOSPHATE 30 MG/1
30 CAPSULE ORAL DAILY
Status: DISCONTINUED | OUTPATIENT
Start: 2022-12-04 | End: 2022-12-04 | Stop reason: RX

## 2022-12-03 RX ORDER — ISOSORBIDE MONONITRATE 30 MG/1
30 TABLET, EXTENDED RELEASE ORAL DAILY
Status: DISCONTINUED | OUTPATIENT
Start: 2022-12-03 | End: 2022-12-04 | Stop reason: HOSPADM

## 2022-12-03 RX ORDER — ONDANSETRON 2 MG/ML
4 INJECTION INTRAMUSCULAR; INTRAVENOUS EVERY 6 HOURS PRN
Status: DISCONTINUED | OUTPATIENT
Start: 2022-12-03 | End: 2022-12-04 | Stop reason: HOSPADM

## 2022-12-03 RX ORDER — BENZONATATE 100 MG/1
100 CAPSULE ORAL 3 TIMES DAILY PRN
Status: DISCONTINUED | OUTPATIENT
Start: 2022-12-03 | End: 2022-12-04 | Stop reason: HOSPADM

## 2022-12-03 RX ORDER — HEPARIN SODIUM 10000 [USP'U]/ML
5000 INJECTION, SOLUTION INTRAVENOUS; SUBCUTANEOUS EVERY 8 HOURS SCHEDULED
Status: DISCONTINUED | OUTPATIENT
Start: 2022-12-03 | End: 2022-12-04 | Stop reason: HOSPADM

## 2022-12-03 RX ORDER — 0.9 % SODIUM CHLORIDE 0.9 %
500 INTRAVENOUS SOLUTION INTRAVENOUS ONCE
Status: DISCONTINUED | OUTPATIENT
Start: 2022-12-03 | End: 2022-12-03

## 2022-12-03 RX ORDER — ONDANSETRON 4 MG/1
4 TABLET, ORALLY DISINTEGRATING ORAL EVERY 8 HOURS PRN
Status: DISCONTINUED | OUTPATIENT
Start: 2022-12-03 | End: 2022-12-04 | Stop reason: HOSPADM

## 2022-12-03 RX ORDER — TAMSULOSIN HYDROCHLORIDE 0.4 MG/1
0.4 CAPSULE ORAL DAILY
Status: DISCONTINUED | OUTPATIENT
Start: 2022-12-03 | End: 2022-12-04 | Stop reason: HOSPADM

## 2022-12-03 RX ORDER — ACETAMINOPHEN 650 MG/1
650 SUPPOSITORY RECTAL EVERY 6 HOURS PRN
Status: DISCONTINUED | OUTPATIENT
Start: 2022-12-03 | End: 2022-12-04 | Stop reason: HOSPADM

## 2022-12-03 RX ORDER — ACETAMINOPHEN 325 MG/1
650 TABLET ORAL EVERY 6 HOURS PRN
Status: DISCONTINUED | OUTPATIENT
Start: 2022-12-03 | End: 2022-12-04 | Stop reason: HOSPADM

## 2022-12-03 RX ORDER — PANTOPRAZOLE SODIUM 40 MG/1
40 TABLET, DELAYED RELEASE ORAL
Status: DISCONTINUED | OUTPATIENT
Start: 2022-12-04 | End: 2022-12-04 | Stop reason: HOSPADM

## 2022-12-03 RX ORDER — SODIUM BICARBONATE 650 MG/1
650 TABLET ORAL 2 TIMES DAILY
Status: DISCONTINUED | OUTPATIENT
Start: 2022-12-03 | End: 2022-12-04 | Stop reason: HOSPADM

## 2022-12-03 RX ORDER — LORAZEPAM 1 MG/1
1 TABLET ORAL DAILY PRN
Status: DISCONTINUED | OUTPATIENT
Start: 2022-12-03 | End: 2022-12-04 | Stop reason: HOSPADM

## 2022-12-03 RX ORDER — SODIUM CHLORIDE 9 MG/ML
INJECTION, SOLUTION INTRAVENOUS PRN
Status: DISCONTINUED | OUTPATIENT
Start: 2022-12-03 | End: 2022-12-04 | Stop reason: HOSPADM

## 2022-12-03 RX ORDER — ASPIRIN 81 MG/1
81 TABLET ORAL DAILY
Status: DISCONTINUED | OUTPATIENT
Start: 2022-12-03 | End: 2022-12-04 | Stop reason: HOSPADM

## 2022-12-03 RX ORDER — ATORVASTATIN CALCIUM 80 MG/1
80 TABLET, FILM COATED ORAL DAILY
Status: DISCONTINUED | OUTPATIENT
Start: 2022-12-03 | End: 2022-12-04 | Stop reason: HOSPADM

## 2022-12-03 RX ORDER — SODIUM CHLORIDE 9 MG/ML
INJECTION, SOLUTION INTRAVENOUS CONTINUOUS
Status: DISCONTINUED | OUTPATIENT
Start: 2022-12-03 | End: 2022-12-04 | Stop reason: HOSPADM

## 2022-12-03 RX ORDER — ZOLPIDEM TARTRATE 5 MG/1
5 TABLET ORAL NIGHTLY PRN
Status: DISCONTINUED | OUTPATIENT
Start: 2022-12-03 | End: 2022-12-04 | Stop reason: HOSPADM

## 2022-12-03 RX ORDER — DOCUSATE SODIUM 100 MG/1
100 CAPSULE, LIQUID FILLED ORAL DAILY PRN
Status: DISCONTINUED | OUTPATIENT
Start: 2022-12-03 | End: 2022-12-04 | Stop reason: HOSPADM

## 2022-12-03 RX ADMIN — SODIUM BICARBONATE TAB 650 MG 650 MG: 650 TAB at 21:30

## 2022-12-03 RX ADMIN — SODIUM CHLORIDE: 9 INJECTION, SOLUTION INTRAVENOUS at 14:03

## 2022-12-03 RX ADMIN — SODIUM CHLORIDE 1000 ML: 9 INJECTION, SOLUTION INTRAVENOUS at 12:38

## 2022-12-03 ASSESSMENT — PAIN - FUNCTIONAL ASSESSMENT: PAIN_FUNCTIONAL_ASSESSMENT: NONE - DENIES PAIN

## 2022-12-03 NOTE — ED PROVIDER NOTES
Department of Emergency Medicine   ED  Provider Note  Admit Date/RoomTime: 12/3/2022 11:35 AM  ED Room: [facility]          History of Present Illness:  12/3/22, Time: 2:06 PM EST  Chief Complaint   Patient presents with    Loss of Consciousness     Pt was diagnosed with flu and started on tamiflu . Pt had a syncopal episode today. +diarrhea                Jaz Colmenares is a 82 y.o. male presenting to the ED for syncope. Patient was diagnosed with flu yesterday. He was started on Tamiflu. He states he had a syncopal episode today. Is been very weak. Had 2 episodes of diarrhea. No hematemesis, hematochezia. He is on no anticoagulation. He is overall does not feel well. Denies any fever, chills, cough, sputum, change in bowel or bladder, lethargy, neck pain or stiffness, or any other symptoms or complaints. Review of Systems:   Pertinent positives and negatives are stated within HPI, all other systems reviewed and are negative.        --------------------------------------------- PAST HISTORY ---------------------------------------------  Past Medical History:  has a past medical history of Anal fissure, Anemia, Arthritis, CAD (coronary artery disease), Diabetes mellitus ([address] GERD (gastroesophageal reflux disease), Gout, History of blood transfusion, Hyperkalemia, Hyperlipidemia, Low back pain, Low grade myelodysplastic syndrome lesions ([address] OA (osteoarthritis), Pain of left lower extremity, and Rectal fissure. Past Surgical History:  has a past surgical history that includes Diagnostic Cardiac Cath Lab Procedure (01/11/89); Diagnostic Cardiac Cath Lab Procedure (01/16/89); Diagnostic Cardiac Cath Lab Procedure (09/22/11); Coronary angioplasty (01/16/89); Coronary angioplasty (04/12/89); Coronary angioplasty (09/22/11); Coronary angioplasty (10/19/11); NM Tumor Localization Spect Multi Day (08/15/11); Total knee arthroplasty (10/99);  Colonoscopy; Endoscopy, colon, diagnostic; Coronary angioplasty with stent (12/3/15); joint replacement (1999); lumbar laminectomy (09/08/2017); Upper gastrointestinal endoscopy (09/26/2017); and Cholecystectomy, laparoscopic (04/10/2018). Social History:  reports that he has never smoked. He has never used smokeless tobacco. He reports current alcohol use. He reports that he does not use drugs. Family History: family history includes Diabetes in his brother; Heart Disease in his brother; Parkinsonism in his brother. . Unless otherwise noted, family history is non contributory    The patients home medications have been reviewed. Allergies: Iodine, Benadryl [diphenhydramine], and Ramipril        ---------------------------------------------------PHYSICAL EXAM--------------------------------------    Constitutional/General: Alert and oriented x3  Head: Normocephalic and atraumatic  Eyes: PERRL, EOMI, sclera non icteric  Mouth: Oropharynx clear, handling secretions, no trismus, no asymmetry of the posterior oropharynx or uvular edema  Neck: Supple, full ROM, no stridor, no meningeal signs  Respiratory: Lungs clear to auscultation bilaterally, no wheezes, rales, or rhonchi. Not in respiratory distress  Cardiovascular:  Regular rate. Regular rhythm. 2+ distal pulses. Equal extremity pulses. Chest: No chest wall tenderness  GI:  Abdomen Soft, Non tender, Non distended. No rebound, guarding, or rigidity. No pulsatile masses. Musculoskeletal: Moves all extremities x 4. Warm and well perfused, no clubbing, cyanosis, or edema. Capillary refill <3 seconds  Integument: skin warm and dry. No rashes. Neurologic: GCS 15, no focal deficits, symmetric strength 5/5 in the upper and lower extremities bilaterally  Psychiatric: Normal Affect          -------------------------------------------------- RESULTS -------------------------------------------------  I have personally reviewed all laboratory and imaging results for this patient. Results are listed below.      LABS: (Lab results interpreted by me)  Results for orders placed or performed during the hospital encounter of 12/03/22   CBC with Auto Differential   Result Value Ref Range    WBC 31.7 (H) 4.5 - 11.5 E9/L    RBC 3.83 3.80 - 5.80 E12/L    Hemoglobin 10.9 (L) 12.5 - 16.5 g/dL    Hematocrit 34.9 (L) 37.0 - 54.0 %    MCV 91.1 80.0 - 99.9 fL    MCH 28.5 26.0 - 35.0 pg    MCHC 31.2 (L) 32.0 - 34.5 %    RDW 15.9 (H) 11.5 - 15.0 fL    Platelets 202 893 - 977 E9/L    MPV 14.5 (H) 7.0 - 12.0 fL    Neutrophils % 49.6 43.0 - 80.0 %    Lymphocytes % 7.7 (L) 20.0 - 42.0 %    Monocytes % 38.5 (H) 2.0 - 12.0 %    Eosinophils % 0.1 0.0 - 6.0 %    Basophils % 0.7 0.0 - 2.0 %    Neutrophils Absolute 17.12 (H) 1.80 - 7.30 E9/L    Lymphocytes Absolute 2.54 1.50 - 4.00 E9/L    Monocytes Absolute 12.36 (H) 0.10 - 0.95 E9/L    Eosinophils Absolute 0.00 (L) 0.05 - 0.50 E9/L    Basophils Absolute 0.00 0.00 - 0.20 E9/L    Metamyelocytes Relative 0.8 0.0 - 1.0 %    Myelocyte Percent 3.4 0 - 0 %    Anisocytosis 2+     Polychromasia 1+     Poikilocytes 1+     Karis Cells 1+     Target Cells 1+    Troponin   Result Value Ref Range    Troponin, High Sensitivity 27 (H) 0 - 11 ng/L   Protime-INR   Result Value Ref Range    Protime 15.1 (H) 9.3 - 12.4 sec    INR 1.4    APTT   Result Value Ref Range    aPTT 35.3 (H) 24.5 - 35.1 sec   Comprehensive Metabolic Panel   Result Value Ref Range    Sodium 134 132 - 146 mmol/L    Potassium 4.6 3.5 - 5.0 mmol/L    Chloride 99 98 - 107 mmol/L    CO2 22 22 - 29 mmol/L    Anion Gap 13 7 - 16 mmol/L    Glucose 162 (H) 74 - 99 mg/dL    BUN 30 (H) 6 - 23 mg/dL    Creatinine 1.9 (H) 0.7 - 1.2 mg/dL    Est, Glom Filt Rate 34 >=60 mL/min/1.73    Calcium 8.8 8.6 - 10.2 mg/dL    Total Protein 8.1 6.4 - 8.3 g/dL    Albumin 3.9 3.5 - 5.2 g/dL    Total Bilirubin 0.4 0.0 - 1.2 mg/dL    Alkaline Phosphatase 32 (L) 40 - 129 U/L    ALT 25 0 - 40 U/L    AST 39 0 - 39 U/L   CK   Result Value Ref Range    Total CK 98 20 - 200 U/L Lactic Acid   Result Value Ref Range    Lactic Acid 3.1 (H) 0.5 - 2.2 mmol/L   POCT Glucose   Result Value Ref Range    Glucose 162 mg/dL    QC OK? y    EKG 12 Lead   Result Value Ref Range    Ventricular Rate 84 BPM    Atrial Rate 84 BPM    P-R Interval 172 ms    QRS Duration 86 ms    Q-T Interval 352 ms    QTc Calculation (Bazett) 415 ms    P Axis 58 degrees    R Axis -15 degrees    T Axis 41 degrees   ,       RADIOLOGY:  Interpreted by Radiologist unless otherwise specified  CT ABDOMEN PELVIS WO CONTRAST Additional Contrast? None   Final Result   THORACIC SPINE:      No fracture. No significant spinal or neuroforaminal stenosis. ABDOMEN/PELVIS:      No acute abnormality. Nonemergent incidental findings as above. CT LUMBAR SPINE WO CONTRAST   Final Result   No acute osseous abnormality involving lumbar spine. Central canal stenosis at L3/L4 and L5/S1. CT THORACIC SPINE WO CONTRAST   Final Result   THORACIC SPINE:      No fracture. No significant spinal or neuroforaminal stenosis. ABDOMEN/PELVIS:      No acute abnormality. Nonemergent incidental findings as above. XR CHEST PORTABLE   Final Result   No acute process. CT HEAD WO CONTRAST   Final Result   1. There is no acute intracranial abnormality. Specifically, there is no   intracranial hemorrhage. 2. Atrophy and periventricular leukomalacia,         CT CERVICAL SPINE WO CONTRAST   Final Result   1. There is no acute compression fracture or subluxation of the cervical   spine. 2. Multilevel degenerative disc and degenerative joint disease.                EKG Interpretation  Interpreted by emergency department physician, Dr. Simeon Riggs     Sinus, rate 84, no STEMI, no ischemic change         ------------------------- NURSING NOTES AND VITALS REVIEWED ---------------------------   The nursing notes within the ED encounter and vital signs as below have been reviewed by myself  BP (!) 104/59 Pulse 86   Temp 97.7 °F (36.5 °C) (Oral)   Resp 16   SpO2 94%     Oxygen Saturation Interpretation: Normal    The patients available past medical records and past encounters were reviewed. ------------------------------ ED COURSE/MEDICAL DECISION MAKING----------------------  Medications   0.9 % sodium chloride infusion (has no administration in time range)   0.9 % sodium chloride bolus (0 mLs IntraVENous Stopped 12/3/22 0453)           The cardiac monitor revealed sinus with a heart rate in the 80s as interpreted by me. The cardiac monitor was ordered secondary to the patient's syncope and to monitor the patient for dysrhythmia. CPT 86575         Medical Decision Making:    Labs and imaging reviewed. Patient will be admitted. Counseling: The emergency provider has spoken with the patient and discussed todays results, in addition to providing specific details for the plan of care and counseling regarding the diagnosis and prognosis. Questions are answered at this time and they are agreeable with the plan.       --------------------------------- IMPRESSION AND DISPOSITION ---------------------------------    IMPRESSION  1. Syncope and collapse    2. Influenza    3. Dehydration        DISPOSITION  Disposition: Admit to telemetry  Patient condition is stable        NOTE: This report was transcribed using voice recognition software.  Every effort was made to ensure accuracy; however, inadvertent computerized transcription errors may be present        Sissy Jackson MD  12/05/22 0587

## 2022-12-03 NOTE — H&P
Hospital Medicine History & Physical      PCP: Yessica Ogden DO    Date of Admission: 12/3/2022    Date of Service: Pt seen/examined on 12/3/22 and Admitted to Inpatient with expected LOS greater than two midnights due to medical therapy. 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 Pt was seen in office yesterday for shortness of breath, congestion, cough and fever and was found to have influenza A. He was started on tamiflu and tassalon and discharged home from office. Had a syncopal episode today and developed diarrhea. States that it occurred when he was on the way to the restroom, did have LOC for 2-3 minutes. Also associated with 2 episodes of explosive diarrhea. In the ED, patient was noted to be hypotensive on presentation, with normal heart rate. His pulse ox was 94% on room air. Labs were significant for CHARANJIT on CKD with Cr of 1.9, lactic acidosis which improved slightly with fluids, leukocytosis of 31k. Pt was given bolus of fluids and started on MIVF. He was admitted for further evaluation and treatment    At time of my evaluation patient reported continued fatigue, cough, and shortness of breath but reports he feels better than when he first came in this morning.       Past Medical History:          Diagnosis Date    Anal fissure 10/27/2016    Anemia     Arthritis     CAD (coronary artery disease)     Diabetes mellitus ([address]     ADULT ONSET    GERD (gastroesophageal reflux disease) 10/27/2016    Gout     History of blood transfusion     Hyperkalemia     Hyperlipidemia     Low back pain 07/21/2017    Low grade myelodysplastic syndrome lesions ([address] 06/13/2017    OA (osteoarthritis) 10/27/2016    Pain of left lower extremity 07/21/2017    Rectal fissure 1987 Past Surgical History:          Procedure Laterality Date    CHOLECYSTECTOMY, LAPAROSCOPIC  04/10/2018    Dr Jordy Buchanan    COLONOSCOPY      CORONARY ANGIOPLASTY  01/16/89    PTCA/PROXIMAL LAD, PTCA MID LAD ([facility])    CORONARY ANGIOPLASTY  04/12/89    REPEAT PTCA TO MID LAD ([facility])    CORONARY ANGIOPLASTY  09/22/11    PTCA/ROTATIONAL ATHERECTOMY & DOUG TO MID LAD, PTCA TO OSTIUM OF DIAGONAL BRANCH THROUGH STENTED SEGMENT IN  LAD. CORONARY ANGIOPLASTY  10/19/11    PTCA/DOUG TO PROXIMAL RCA (DR. YOUNGER)    CORONARY ANGIOPLASTY WITH STENT PLACEMENT  12/3/15    Dr. Perfecto Cedillo 3.0x15 RCA    DIAGNOSTIC CARDIAC CATH LAB PROCEDURE  01/11/89    @[facility]    DIAGNOSTIC CARDIAC CATH LAB PROCEDURE  01/16/89    @[facility]    DIAGNOSTIC CARDIAC CATH LAB PROCEDURE  09/22/11    ENDOSCOPY, COLON, DIAGNOSTIC      JOINT REPLACEMENT  1999    KNEE    LUMBAR LAMINECTOMY  09/08/2017    lumbar laminectomy  [id number]    NM TUMOR LOCALIZATION SPECT MULTI DAY  08/15/11    CTA CORONARIES:  PROXIMAL TO MID LAD STENOSIS, PROXIMAL AND DISTAL RCA STENOSIS, CALCIUM SCORE 2594. TOTAL KNEE ARTHROPLASTY  10/99    BILATERAL    UPPER GASTROINTESTINAL ENDOSCOPY  09/26/2017    with colonoscopy       Medications Prior to Admission:      Prior to Admission medications    Medication Sig Start Date End Date Taking? Authorizing Provider   oseltamivir (TAMIFLU) 30 MG capsule Take 1 capsule by mouth 2 times daily for 5 days 12/2/22 12/7/22  IZABELA Ko III   benzonatate (TESSALON) 100 MG capsule Take 1 capsule by mouth 3 times daily as needed for Cough 12/2/22 12/9/22  IZABELA Ko III   LORazepam (ATIVAN) 1 MG tablet Take 1 tablet by mouth daily as needed for Anxiety (For anxiety and insomnia) for up to 360 days.  11/23/22 11/18/23  Swapnil Isabel MD   zolpidem (AMBIEN) 5 MG tablet Take 1 tablet by mouth nightly as needed for Sleep. 11/23/22 11/23/23  Swapnil Isabel MD   Handicap Placard MISC by Does not apply route Dx - Gait instability  Duration - 8/27 8/22/22   Ritchie Arcos DO   alirocumab (PRALUENT) 75 MG/ML SOAJ injection pen Inject 1 mL into the skin every 14 days 7/11/22   Swapnil Isabel MD   empagliflozin (JARDIANCE) 10 MG tablet Take 1 tablet by mouth daily 7/11/22   Swapnil Isabel MD   isosorbide mononitrate (IMDUR) 30 MG extended release tablet TAKE ONE TABLET BY MOUTH DAILY 4/29/22   Marcelle Vaughn MD   allopurinol (ZYLOPRIM) 300 MG tablet TAKE ONE TABLET BY MOUTH EVERY DAY 4/11/22   Swapnil Isabel MD   carvedilol (COREG) 3.125 MG tablet TAKE ONE TABLET BY MOUTH TWO TIMES A DAY 1/10/22   Swapnil Isabel MD   vitamin D (ERGOCALCIFEROL) 1.25 MG (43944 UT) CAPS capsule Take 50,000 Units by mouth every 30 days    Hsp Historical Provider   patiromer sorbitex calcium (VELTASSA) 8.4 g PACK packet Take 8.4 g by mouth every other day    [facility]sp Historical Provider   sodium bicarbonate 650 MG tablet Take 650 mg by mouth 2 times daily    [facility]sp Historical Provider   tamsulosin (FLOMAX) 0.4 MG capsule Take 0.4 mg by mouth daily    [facility] Historical Provider   ezetimibe (ZETIA) 10 MG tablet Take 1 tablet by mouth daily 4/12/21   Swapnil Isabel MD   metFORMIN (GLUCOPHAGE) 1000 MG tablet Take 1 tablet by mouth 2 times daily (with meals) 3/1/21   Amanda De Oliveira MD   nateglinide (STARLIX) 120 MG tablet TAKE ONE TABLET BY MOUTH THREE TIMES A DAY BEFORE MEALS 2/17/21   Amanda De Oliveira MD   atorvastatin (LIPITOR) 80 MG tablet Take 80 mg by mouth daily  10/25/20   Historical Provider, MD   glucose monitoring kit (FREESTYLE) monitoring kit 1 kit by Does not apply route daily as needed (as directed) 6/20/20   Amanda De Oliveira MD   blood glucose test strips (ASCENSIA AUTODISC VI;ONE TOUCH ULTRA TEST VI) strip 1 each by In Vitro route daily As needed.  One Touch Ultra 2 Test strips DX: E11.9 6/20/20   Amanda De Oliveira MD   Blood Glucose Calibration (OT ULTRA/FASTTK CNTRL SOLN) SOLN 1 Bottle by In Vitro route as needed (as directed) 4/16/19   Amanda De Oliveira MD   docusate sodium (COLACE) 100 MG capsule Take 1 capsule by mouth daily as needed for Constipation 10/4/17   Sienna Villela MD   omeprazole (PRILOSEC) 20 MG delayed release capsule Take 20 mg by mouth daily PRN    Historical Provider, MD   nitroGLYCERIN (NITROSTAT) 0.4 MG SL tablet Place 1 tablet under the tongue every 5 minutes as needed for Chest pain 6/9/16   Nam Chaudhary MD   aspirin 81 MG EC tablet Take 81 mg by mouth daily. Historical Provider, MD   vitamin B-12 (CYANOCOBALAMIN) 1000 MCG tablet Take 1,000 mcg by mouth daily. Historical Provider, MD   folic acid (FOLVITE) 116 MCG tablet Take 400 mcg by mouth daily     Historical Provider, MD       Allergies:  Iodine, Benadryl [diphenhydramine], and Ramipril    Social History:      The patient currently lives at home with family     TOBACCO:   reports that he has never smoked. He has never used smokeless tobacco.  ETOH:   reports current alcohol use. Family History:      Reviewed in detail and negative for DM, CAD, Cancer, CVA. Positive as follows:        Problem Relation Age of Onset    Heart Disease Brother     Diabetes Brother     Parkinsonism Brother        REVIEW OF SYSTEMS:   Pertinent positives as noted in the HPI. All other systems reviewed and negative. PHYSICAL EXAM:    BP (!) 104/59   Pulse 86   Temp 97.7 °F (36.5 °C) (Oral)   Resp 16   SpO2 94%     General appearance:  No apparent distress, appears stated age and cooperative. HEENT:  Normal cephalic, atraumatic without obvious deformity. Pupils equal, round, and reactive to light. Extra ocular muscles intact. Conjunctivae/corneas clear. Nasal congestion noted. Dry oral mucosa   Neck: Supple, with full range of motion. No jugular venous distention. Trachea midline. Respiratory:  Normal respiratory effort. Clear to auscultation, bilaterally without Rales/Wheezes/Rhonchi. Cardiovascular:  Regular rate and rhythm with normal S1/S2 without rubs or gallops. Early systolic murmur 3/6. Abdomen: Soft, non-tender, non-distended with normal bowel sounds. Musculoskeletal:  No clubbing, cyanosis or edema bilaterally. Full range of motion without deformity. Skin: Skin color, texture, turgor normal.  No rashes or lesions. Neurologic:  Neurovascularly intact without any focal sensory/motor deficits. Grossly non-focal.  Psychiatric:  Alert and oriented, thought content appropriate, normal insight    XR CHEST PORTABLE   Final Result   No acute process. CT HEAD WO CONTRAST   Final Result   1. There is no acute intracranial abnormality. Specifically, there is no   intracranial hemorrhage. 2. Atrophy and periventricular leukomalacia,         CT CERVICAL SPINE WO CONTRAST   Final Result   1. There is no acute compression fracture or subluxation of the cervical   spine. 2. Multilevel degenerative disc and degenerative joint disease.          CT ABDOMEN PELVIS WO CONTRAST Additional Contrast? None    (Results Pending)   CT LUMBAR SPINE WO CONTRAST    (Results Pending)   CT THORACIC SPINE WO CONTRAST    (Results Pending)       Labs:     Recent Labs     12/03/22  1225   WBC 31.7*   HGB 10.9*   HCT 34.9*        Recent Labs     12/03/22  1225      K 4.6   CL 99   CO2 22   BUN 30*   CREATININE 1.9*   CALCIUM 8.8     Recent Labs     12/03/22  1225   AST 39   ALT 25   BILITOT 0.4   ALKPHOS 32*     Recent Labs     12/03/22  1225   INR 1.4     Recent Labs     12/03/22  1225   CKTOTAL 98       Urinalysis:      Lab Results   Component Value Date/Time    NITRU Negative 12/03/2022 12:25 PM    [address] 12/03/2022 12:25 PM    WBCUA 0-1 09/20/2011 09:45 AM    BACTERIA FEW 12/03/2022 12:25 PM    RBCUA NONE 12/03/2022 12:25 PM    RBCUA NONE 09/20/2011 09:45 AM    BLOODU TRACE-INTACT 12/03/2022 12:25 PM    SPECGRAV 1.010 12/03/2022 12:25 PM    GLUCOSEU >=1000 12/03/2022 12:25 PM    GLUCOSEU NEGATIVE 09/20/2011 09:45 AM         ASSESSMENT:  Influenza pneumonia  Leukocytosis  Diarrhea   Syncope  Mild aortic stenosis   Dehydration  CHARANJIT on CKD  CAD  DM2  GERD  HLD    PLAN:  Admit inpatient  Strict I/O, monitor renal function  Continue MIVF  Pt is on metformin at baseline and likely explanation for mild continued lactic acidosis   Follow up C diff testing   Glycemic control  Continue daily tamiflu (renally dosed)  If renal function does not improve, then can consult nephrology in am   PT/OT  Suspect syncope related to dehydration but will hold beta blocker pending clinical progression  Orthostatics qshift   Of note pt does have leukocytosis at baseline and follows with hematologist but usually less than 20k; suspect current increased WBC in setting of influenza as well as dehydration     DVT Prophylaxis: heparin   Diet: No diet orders on file  Code Status: DNR-CCA    PT/OT Eval Status: ordered    Dispo - home at discharge       Saritha Panchal MD    Thank you Yessica Ogden DO for the opportunity to be involved in this patient's care. If you have any questions or concerns please feel free to contact me through [address] [place] [address].

## 2022-12-04 VITALS
OXYGEN SATURATION: 94 % | TEMPERATURE: 98 F | HEART RATE: 96 BPM | DIASTOLIC BLOOD PRESSURE: 73 MMHG | RESPIRATION RATE: 16 BRPM | SYSTOLIC BLOOD PRESSURE: 115 MMHG

## 2022-12-04 LAB
ANION GAP SERPL CALCULATED.3IONS-SCNC: 13 MMOL/L (ref 7–16)
ANISOCYTOSIS: ABNORMAL
BASOPHILS ABSOLUTE: 0 E9/L (ref 0–0.2)
BASOPHILS RELATIVE PERCENT: 0.9 % (ref 0–2)
BUN BLDV-MCNC: 20 MG/DL (ref 6–23)
CALCIUM SERPL-MCNC: 9.1 MG/DL (ref 8.6–10.2)
CHLORIDE BLD-SCNC: 99 MMOL/L (ref 98–107)
CO2: 21 MMOL/L (ref 22–29)
CREAT SERPL-MCNC: 1.2 MG/DL (ref 0.7–1.2)
EKG ATRIAL RATE: 84 BPM
EKG P AXIS: 58 DEGREES
EKG P-R INTERVAL: 172 MS
EKG Q-T INTERVAL: 352 MS
EKG QRS DURATION: 86 MS
EKG QTC CALCULATION (BAZETT): 415 MS
EKG R AXIS: -15 DEGREES
EKG T AXIS: 41 DEGREES
EKG VENTRICULAR RATE: 84 BPM
EOSINOPHILS ABSOLUTE: 0 E9/L (ref 0.05–0.5)
EOSINOPHILS RELATIVE PERCENT: 0.1 % (ref 0–6)
GFR SERPL CREATININE-BSD FRML MDRD: 59 ML/MIN/1.73
GLUCOSE BLD-MCNC: 102 MG/DL (ref 74–99)
HCT VFR BLD CALC: 36.8 % (ref 37–54)
HEMOGLOBIN: 11.2 G/DL (ref 12.5–16.5)
LYMPHOCYTES ABSOLUTE: 1.31 E9/L (ref 1.5–4)
LYMPHOCYTES RELATIVE PERCENT: 5.2 % (ref 20–42)
MCH RBC QN AUTO: 27.7 PG (ref 26–35)
MCHC RBC AUTO-ENTMCNC: 30.4 % (ref 32–34.5)
MCV RBC AUTO: 90.9 FL (ref 80–99.9)
MONOCYTES ABSOLUTE: 10.7 E9/L (ref 0.1–0.95)
MONOCYTES RELATIVE PERCENT: 40.9 % (ref 2–12)
MYELOCYTE PERCENT: 3.5 % (ref 0–0)
NEUTROPHILS ABSOLUTE: 14.09 E9/L (ref 1.8–7.3)
NEUTROPHILS RELATIVE PERCENT: 50.4 % (ref 43–80)
OVALOCYTES: ABNORMAL
PDW BLD-RTO: 15.9 FL (ref 11.5–15)
PLATELET # BLD: 241 E9/L (ref 130–450)
PMV BLD AUTO: 14.1 FL (ref 7–12)
POIKILOCYTES: ABNORMAL
POLYCHROMASIA: ABNORMAL
POTASSIUM REFLEX MAGNESIUM: 4 MMOL/L (ref 3.5–5)
RBC # BLD: 4.05 E12/L (ref 3.8–5.8)
SODIUM BLD-SCNC: 133 MMOL/L (ref 132–146)
TARGET CELLS: ABNORMAL
TEAR DROP CELLS: ABNORMAL
WBC # BLD: 26.1 E9/L (ref 4.5–11.5)

## 2022-12-04 PROCEDURE — 85025 COMPLETE CBC W/AUTO DIFF WBC: CPT

## 2022-12-04 PROCEDURE — 93010 ELECTROCARDIOGRAM REPORT: CPT | Performed by: INTERNAL MEDICINE

## 2022-12-04 PROCEDURE — 6370000000 HC RX 637 (ALT 250 FOR IP): Performed by: INTERNAL MEDICINE

## 2022-12-04 PROCEDURE — 6360000002 HC RX W HCPCS: Performed by: INTERNAL MEDICINE

## 2022-12-04 PROCEDURE — 80048 BASIC METABOLIC PNL TOTAL CA: CPT

## 2022-12-04 PROCEDURE — 2580000003 HC RX 258: Performed by: INTERNAL MEDICINE

## 2022-12-04 RX ADMIN — Medication 10 ML: at 12:31

## 2022-12-04 RX ADMIN — ISOSORBIDE MONONITRATE 30 MG: 30 TABLET, EXTENDED RELEASE ORAL at 12:24

## 2022-12-04 RX ADMIN — ASPIRIN 81 MG: 81 TABLET, COATED ORAL at 12:24

## 2022-12-04 RX ADMIN — HEPARIN SODIUM 5000 UNITS: 10000 INJECTION INTRAVENOUS; SUBCUTANEOUS at 12:32

## 2022-12-04 RX ADMIN — PANTOPRAZOLE SODIUM 40 MG: 40 TABLET, DELAYED RELEASE ORAL at 12:24

## 2022-12-04 ASSESSMENT — PAIN - FUNCTIONAL ASSESSMENT: PAIN_FUNCTIONAL_ASSESSMENT: NONE - DENIES PAIN

## 2022-12-04 NOTE — ED NOTES
Diet tray, regular 4carb, no pork products ordered for pt at this time.       Wali Cortez RN  12/04/22 [id number]

## 2022-12-04 NOTE — ED NOTES
This RN assumes care; pt revitaled.  Pt in bed A&Ox4, no complaints at this time     Frank Quinn RN  12/03/22 [id number]

## 2022-12-04 NOTE — ED NOTES
Message sent to pharmacy in regards to morning medications unable to be located at this time.       Jia Sesay RN  12/04/22 [id number]

## 2022-12-04 NOTE — DISCHARGE SUMMARY
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 Pt was seen in office yesterday for shortness of breath, congestion, cough and fever and was found to have influenza A. He was started on tamiflu and tassalon and discharged home from office. Had a syncopal episode today and developed diarrhea. States that it occurred when he was on the way to the restroom, did have LOC for 2-3 minutes. Also associated with 2 episodes of explosive diarrhea. In the ED, patient was noted to be hypotensive on presentation, with normal heart rate. His pulse ox was 94% on room air. Labs were significant for CHARANJIT on CKD with Cr of 1.9, lactic acidosis which improved slightly with fluids, leukocytosis of 31k. Pt was given bolus of fluids and started on MIVF    Patient was treated with IV fluids with improvement in renal function back to baseline. Patient reports improvement clinically. Syncopal episodes likely vasovagal in light of diarrhea and influenza. Patient does not report any further symptoms at this time. Patient is eager to go home. Patient will be discharged home in stable condition. Patient will continue home dose of Tamiflu. Patient to follow-up with PCP within 1 to 2 weeks.     Exam:     BP (!) 154/82   Pulse 77   Temp 97.7 °F (36.5 °C)   Resp 16   SpO2 93%     General appearance: No apparent distress, appears stated age and cooperative. HEENT: Pupils equal, round, and reactive to light. Conjunctivae/corneas clear. Neck: Supple, with full range of motion. No jugular venous distention. Trachea midline. Respiratory:  Normal respiratory effort. Clear to auscultation, bilaterally without Rales/Wheezes/Rhonchi. Cardiovascular: Regular rate and rhythm with normal S1/S2 without murmurs, rubs or gallops. Abdomen: Soft, non-tender, non-distended with normal bowel sounds. Musculoskeletal: No clubbing, cyanosis or edema bilaterally. Full range of motion without deformity. Skin: Skin color, texture, turgor normal.  No rashes or lesions. Neurologic:  Neurovascularly intact without any focal sensory/motor deficits. Cranial nerves: II-XII intact, grossly non-focal.  Psychiatric: Alert and oriented, thought content appropriate, normal insight        Labs: For convenience and continuity at follow-up the following most recent labs are provided:      CBC:    Lab Results   Component Value Date/Time    WBC 26.1 12/04/2022 12:28 PM    HGB 11.2 12/04/2022 12:28 PM    HCT 36.8 12/04/2022 12:28 PM     12/04/2022 12:28 PM       Renal:    Lab Results   Component Value Date/Time     12/04/2022 12:28 PM    K 4.0 12/04/2022 12:28 PM    CL 99 12/04/2022 12:28 PM    CO2 21 12/04/2022 12:28 PM    BUN 20 12/04/2022 12:28 PM    CREATININE 1.2 12/04/2022 12:28 PM    CALCIUM 9.1 12/04/2022 12:28 PM    PHOS 3.9 10/11/2022 01:21 PM       Discharge Medications:     Current Discharge Medication List             Details   oseltamivir (TAMIFLU) 30 MG capsule Take 1 capsule by mouth 2 times daily for 5 days  Qty: 10 capsule, Refills: 0      benzonatate (TESSALON) 100 MG capsule Take 1 capsule by mouth 3 times daily as needed for Cough  Qty: 21 capsule, Refills: 0      LORazepam (ATIVAN) 1 MG tablet Take 1 tablet by mouth daily as needed for Anxiety (For anxiety and insomnia) for up to 360 days.   Qty: 15 tablet, Refills: 4 Associated Diagnoses: Anxiety; Insomnia, unspecified type      zolpidem (AMBIEN) 5 MG tablet Take 1 tablet by mouth nightly as needed for Sleep.   Qty: 10 tablet, Refills: 3    Associated Diagnoses: Insomnia, unspecified type      Handicap Placard MISC by Does not apply route Dx - Gait instability  Duration - 8/27  Qty: 1 each, Refills: 0      alirocumab (PRALUENT) 75 MG/ML SOAJ injection pen Inject 1 mL into the skin every 14 days  Qty: 2.24 mL, Refills: 6      empagliflozin (JARDIANCE) 10 MG tablet Take 1 tablet by mouth daily  Qty: 30 tablet, Refills: 6      isosorbide mononitrate (IMDUR) 30 MG extended release tablet TAKE ONE TABLET BY MOUTH DAILY  Qty: 90 tablet, Refills: 3      allopurinol (ZYLOPRIM) 300 MG tablet TAKE ONE TABLET BY MOUTH EVERY DAY  Qty: 90 tablet, Refills: 3      carvedilol (COREG) 3.125 MG tablet TAKE ONE TABLET BY MOUTH TWO TIMES A DAY  Qty: 180 tablet, Refills: 0    Comments: pt would like a 90 day supply please      vitamin D (ERGOCALCIFEROL) 1.25 MG (22701 UT) CAPS capsule Take 50,000 Units by mouth every 30 days      patiromer sorbitex calcium (VELTASSA) 8.4 g PACK packet Take 8.4 g by mouth every other day      sodium bicarbonate 650 MG tablet Take 650 mg by mouth 2 times daily      tamsulosin (FLOMAX) 0.4 MG capsule Take 0.4 mg by mouth daily      ezetimibe (ZETIA) 10 MG tablet Take 1 tablet by mouth daily  Qty: 30 tablet, Refills: 6      metFORMIN (GLUCOPHAGE) 1000 MG tablet Take 1 tablet by mouth 2 times daily (with meals)  Qty: 180 tablet, Refills: 3      nateglinide (STARLIX) 120 MG tablet TAKE ONE TABLET BY MOUTH THREE TIMES A DAY BEFORE MEALS  Qty: 270 tablet, Refills: 1      atorvastatin (LIPITOR) 80 MG tablet Take 80 mg by mouth daily       glucose monitoring kit (FREESTYLE) monitoring kit 1 kit by Does not apply route daily as needed (as directed)  Qty: 1 kit, Refills: 0    Associated Diagnoses: Type II diabetes mellitus with peripheral circulatory disorder (HCC)      blood glucose test strips (ASCENSIA AUTODISC VI;ONE TOUCH ULTRA TEST VI) strip 1 each by In Vitro route daily As needed. One Touch Ultra 2 Test strips DX: E11.9  Qty: 100 each, Refills: 3    Associated Diagnoses: Type II diabetes mellitus with peripheral circulatory disorder (HCC)      Blood Glucose Calibration (OT ULTRA/FASTTK CNTRL SOLN) SOLN 1 Bottle by In Vitro route as needed (as directed)  Qty: 1 each, Refills: 3    Associated Diagnoses: Type II diabetes mellitus with peripheral circulatory disorder (HCC)      docusate sodium (COLACE) 100 MG capsule Take 1 capsule by mouth daily as needed for Constipation      omeprazole (PRILOSEC) 20 MG delayed release capsule Take 20 mg by mouth daily PRN      nitroGLYCERIN (NITROSTAT) 0.4 MG SL tablet Place 1 tablet under the tongue every 5 minutes as needed for Chest pain  Qty: 25 tablet, Refills: 3      aspirin 81 MG EC tablet Take 81 mg by mouth daily. vitamin B-12 (CYANOCOBALAMIN) 1000 MCG tablet Take 1,000 mcg by mouth daily. folic acid (FOLVITE) 464 MCG tablet Take 400 mcg by mouth daily              Time Spent on discharge is more than 20 minutes in the examination, evaluation, counseling and review of medications and discharge plan.       Signed:    Garret Zayas MD   12/4/2022 PROVIDER:[TOKEN:[[id number]]]

## 2022-12-05 ENCOUNTER — TELEPHONE (OUTPATIENT)
Dept: PRIMARY CARE CLINIC | Age: 87
End: 2022-12-05

## 2022-12-05 NOTE — TELEPHONE ENCOUNTER
Care Transitions Initial Follow Up Call    Outreach made within 2 business days of discharge: Yes    Patient: Rasheeda Johnson Patient : 1935   MRN: [id number]  Reason for Admission: There are no discharge diagnoses documented for the most recent discharge. Discharge Date: 22       Spoke with: YAMILKA     Discharge department/facility: [facility] Interactive Patient Contact:  Was patient able to fill all prescriptions: Yes  Was patient instructed to bring all medications to the follow-up visit: Yes  Is patient taking all medications as directed in the discharge summary? Yes  Does patient understand their discharge instructions: Yes  Does patient have questions or concerns that need addressed prior to 7-14 day follow up office visit: no    Patient states he is home and recovering on the medication given to him in the ER. Patient states he does not need to follow up with doctor at this time but will call back if he needs one.      Scheduled appointment with PCP within 7-14 days    Follow Up  Future Appointments   Date Time Provider Mao Christina   2023  1:30 PM DO Maryan Capps [address]

## 2022-12-14 ENCOUNTER — OFFICE VISIT (OUTPATIENT)
Dept: PRIMARY CARE CLINIC | Age: 87
End: 2022-12-14
Payer: MEDICARE

## 2022-12-14 VITALS
HEART RATE: 58 BPM | TEMPERATURE: 97.3 F | BODY MASS INDEX: 26.52 KG/M2 | HEIGHT: 66 IN | RESPIRATION RATE: 16 BRPM | OXYGEN SATURATION: 98 % | WEIGHT: 165 LBS | DIASTOLIC BLOOD PRESSURE: 80 MMHG | SYSTOLIC BLOOD PRESSURE: 130 MMHG

## 2022-12-14 DIAGNOSIS — J40 BRONCHITIS: Primary | ICD-10-CM

## 2022-12-14 PROCEDURE — G8417 CALC BMI ABV UP PARAM F/U: HCPCS | Performed by: INTERNAL MEDICINE

## 2022-12-14 PROCEDURE — 1123F ACP DISCUSS/DSCN MKR DOCD: CPT | Performed by: INTERNAL MEDICINE

## 2022-12-14 PROCEDURE — G8484 FLU IMMUNIZE NO ADMIN: HCPCS | Performed by: INTERNAL MEDICINE

## 2022-12-14 PROCEDURE — 99213 OFFICE O/P EST LOW 20 MIN: CPT | Performed by: INTERNAL MEDICINE

## 2022-12-14 PROCEDURE — 1111F DSCHRG MED/CURRENT MED MERGE: CPT | Performed by: INTERNAL MEDICINE

## 2022-12-14 PROCEDURE — 1036F TOBACCO NON-USER: CPT | Performed by: INTERNAL MEDICINE

## 2022-12-14 PROCEDURE — G8427 DOCREV CUR MEDS BY ELIG CLIN: HCPCS | Performed by: INTERNAL MEDICINE

## 2022-12-14 RX ORDER — BROMPHENIRAMINE MALEATE, PSEUDOEPHEDRINE HYDROCHLORIDE, AND DEXTROMETHORPHAN HYDROBROMIDE 2; 30; 10 MG/5ML; MG/5ML; MG/5ML
5 SYRUP ORAL 4 TIMES DAILY PRN
Qty: 250 ML | Refills: 0 | Status: SHIPPED | OUTPATIENT
Start: 2022-12-14

## 2022-12-14 RX ORDER — PREDNISONE 10 MG/1
TABLET ORAL
Qty: 30 TABLET | Refills: 0 | Status: SHIPPED | OUTPATIENT
Start: 2022-12-14

## 2022-12-14 RX ORDER — DOXYCYCLINE HYCLATE 100 MG
100 TABLET ORAL 2 TIMES DAILY
Qty: 14 TABLET | Refills: 0 | Status: SHIPPED | OUTPATIENT
Start: 2022-12-14 | End: 2022-12-21

## 2022-12-14 SDOH — ECONOMIC STABILITY: FOOD INSECURITY: WITHIN THE PAST 12 MONTHS, YOU WORRIED THAT YOUR FOOD WOULD RUN OUT BEFORE YOU GOT MONEY TO BUY MORE.: NEVER TRUE

## 2022-12-14 SDOH — ECONOMIC STABILITY: FOOD INSECURITY: WITHIN THE PAST 12 MONTHS, THE FOOD YOU BOUGHT JUST DIDN'T LAST AND YOU DIDN'T HAVE MONEY TO GET MORE.: NEVER TRUE

## 2022-12-14 ASSESSMENT — SOCIAL DETERMINANTS OF HEALTH (SDOH): HOW HARD IS IT FOR YOU TO PAY FOR THE VERY BASICS LIKE FOOD, HOUSING, MEDICAL CARE, AND HEATING?: NOT HARD AT ALL

## 2022-12-14 NOTE — PROGRESS NOTES
12/14/22    Gregg Caruso, a male of 82 y.o. presents today for Cough    He was recently in the emergency department on December 3. At that time he was evaluated for syncope and collapse. He was treated with fluids. He had a CAT scan of the head cervical, thoracic lumbar spine and abdomen. He was previously diagnosed with influenza A. He has been having a persistent cough. He does not have SOB/ wheezing. /80   Pulse 58   Temp 97.3 °F (36.3 °C)   Resp 16   Ht 5' 6\" (1.676 m)   Wt 165 lb (74.8 kg)   SpO2 98%   BMI 26.63 kg/m²     Review of Systems  Constitutional:Negative for activity change, appetite change, chills, fatigue and fever. Respiratory: Negative for choking, chest tightness, shortness of breath and wheezing. Cardiovascular: Negative for chest pain, palpitations and leg swelling. Gastrointestinal: Negative for abdominal distention, constipation, diarrhea, nausea and vomiting. Musculoskeletal: Negative for arthralgias, back pain, gait problem and joint swelling. Neurological: Negative for dizziness, weakness,numbness and headaches.      Patient Active Problem List    Diagnosis Date Noted    Influenzal pneumonia 12/03/2022    Hyperlipidemia, mixed 02/04/2019    Acute pancreatitis 03/03/2018    Chest pain 03/02/2018    Lower GI bleed 09/25/2017    Cardiac enzymes elevated 09/11/2017    Postprocedural hypotension 09/11/2017    Lumbar stenosis     Low back pain 07/21/2017    Pain of left lower extremity 07/21/2017    Hypotension     Type 2 diabetes mellitus with diabetic peripheral angiopathy without gangrene, without long-term current use of insulin ([facility])     Pure hypercholesterolemia     Low grade myelodysplastic syndrome lesions ([address] 06/13/2017    Diabetic peripheral neuropathy ([address] 05/05/2017    Spinal stenosis, lumbosacral region 03/07/2017    Anal fissure 10/27/2016    OA (osteoarthritis) 10/27/2016    GERD (gastroesophageal reflux disease) 10/27/2016    Non morbid obesity due to excess calories 10/05/2015    H/O total knee replacement 10/05/2015    Anemia of chronic disease 34/63/7174    Diastolic dysfunction, left ventricle 07/15/2015    JANE (dyspnea on exertion) 07/15/2015    Mitral regurgitation 07/15/2015    Over weight 07/15/2015    Coronary artery disease involving native coronary artery of native heart without angina pectoris 09/23/2011    Dyslipidemia 09/23/2011    DM (diabetes mellitus) ([facility] 09/23/2011     Allergies   Allergen Reactions    Iodine Rash     IV Iodine    Benadryl [Diphenhydramine]      IV BENADRYL     Ramipril      cough     Current Outpatient Medications on File Prior to Visit   Medication Sig Dispense Refill    LORazepam (ATIVAN) 1 MG tablet Take 1 tablet by mouth daily as needed for Anxiety (For anxiety and insomnia) for up to 360 days. 15 tablet 4    zolpidem (AMBIEN) 5 MG tablet Take 1 tablet by mouth nightly as needed for Sleep.  10 tablet 3    Handicap Placard MISC by Does not apply route Dx - Gait instability  Duration - 8/27 1 each 0    alirocumab (PRALUENT) 75 MG/ML SOAJ injection pen Inject 1 mL into the skin every 14 days 2.24 mL 6    empagliflozin (JARDIANCE) 10 MG tablet Take 1 tablet by mouth daily 30 tablet 6    isosorbide mononitrate (IMDUR) 30 MG extended release tablet TAKE ONE TABLET BY MOUTH DAILY 90 tablet 3    allopurinol (ZYLOPRIM) 300 MG tablet TAKE ONE TABLET BY MOUTH EVERY DAY 90 tablet 3    carvedilol (COREG) 3.125 MG tablet TAKE ONE TABLET BY MOUTH TWO TIMES A  tablet 0    vitamin D (ERGOCALCIFEROL) 1.25 MG (37917 UT) CAPS capsule Take 50,000 Units by mouth every 30 days      patiromer sorbitex calcium (VELTASSA) 8.4 g PACK packet Take 8.4 g by mouth every other day      sodium bicarbonate 650 MG tablet Take 650 mg by mouth 2 times daily      tamsulosin (FLOMAX) 0.4 MG capsule Take 0.4 mg by mouth daily      ezetimibe (ZETIA) 10 MG tablet Take 1 tablet by mouth daily 30 tablet 6    metFORMIN (GLUCOPHAGE) 1000 MG tablet Take 1 tablet by mouth 2 times daily (with meals) 180 tablet 3    nateglinide (STARLIX) 120 MG tablet TAKE ONE TABLET BY MOUTH THREE TIMES A DAY BEFORE MEALS 270 tablet 1    atorvastatin (LIPITOR) 80 MG tablet Take 80 mg by mouth daily       glucose monitoring kit (FREESTYLE) monitoring kit 1 kit by Does not apply route daily as needed (as directed) 1 kit 0    blood glucose test strips (ASCENSIA AUTODISC VI;ONE TOUCH ULTRA TEST VI) strip 1 each by In Vitro route daily As needed. One Touch Ultra 2 Test strips DX: E11.9 100 each 3    Blood Glucose Calibration (OT ULTRA/FASTTK CNTRL SOLN) SOLN 1 Bottle by In Vitro route as needed (as directed) 1 each 3    docusate sodium (COLACE) 100 MG capsule Take 1 capsule by mouth daily as needed for Constipation      omeprazole (PRILOSEC) 20 MG delayed release capsule Take 20 mg by mouth daily PRN      nitroGLYCERIN (NITROSTAT) 0.4 MG SL tablet Place 1 tablet under the tongue every 5 minutes as needed for Chest pain 25 tablet 3    aspirin 81 MG EC tablet Take 81 mg by mouth daily. vitamin B-12 (CYANOCOBALAMIN) 1000 MCG tablet Take 1,000 mcg by mouth daily. folic acid (FOLVITE) 936 MCG tablet Take 400 mcg by mouth daily        No current facility-administered medications on file prior to visit. Physical Exam   Constitutional:  Oriented to person, place, and time. Appears well-developed and well-nourished. No acute distress. HENT: No sinus tenderness or lymphadenopathy  Head: Normocephalic and atraumatic. Eyes: Eyes exhibits no discharge. No scleral icterus present. Neck: No tracheal deviation present. No thyromegaly present. Cardiovascular: Normal rate, regular rhythm, normal heart sounds and intact distal pulses. Exam reveals no gallop nor friction rub. No murmur heard. Pulmonary: Effort normal and breath sounds normal. No respiratory distress. No wheezes or rales.     Abdomen: No signs of rigidity rebound or organomegaly  Musculoskeletal: No tenderness to palpation  Neurological:Alert and oriented to person, place, and time. Skin: No diaphoresis. Psychiatric: Normal mood and affect. Behavior is Normal.     ASSESSMENT AND PLAN:    Assessment  Diagnoses and all orders for this visit:  Bronchitis  Other orders  -     predniSONE (DELTASONE) 10 MG tablet; 5 tab daily x 2 days, then 4 tab daily x 2 days, then 3 tab daily x 2 days, then 2 tab daily x 2 days, then 1 tab daily x 2 days then stop. -     brompheniramine-pseudoephedrine-DM 2-30-10 MG/5ML syrup; Take 5 mLs by mouth 4 times daily as needed for Cough Ok to substitute nearest mL bottle  -     doxycycline hyclate (VIBRA-TABS) 100 MG tablet;  Take 1 tablet by mouth 2 times daily for 7 days      Electronically signed by Tripp Villalobos DO on 12/14/2022 at [address] [facility] DO

## 2023-02-20 ENCOUNTER — OFFICE VISIT (OUTPATIENT)
Dept: PRIMARY CARE CLINIC | Age: 88
End: 2023-02-20
Payer: MEDICARE

## 2023-02-20 VITALS
WEIGHT: 163 LBS | TEMPERATURE: 97.4 F | SYSTOLIC BLOOD PRESSURE: 120 MMHG | OXYGEN SATURATION: 97 % | HEART RATE: 86 BPM | DIASTOLIC BLOOD PRESSURE: 62 MMHG | BODY MASS INDEX: 26.31 KG/M2

## 2023-02-20 DIAGNOSIS — D46.20 LOW GRADE MYELODYSPLASTIC SYNDROME LESIONS (HCC): ICD-10-CM

## 2023-02-20 DIAGNOSIS — E11.51 TYPE II DIABETES MELLITUS WITH PERIPHERAL CIRCULATORY DISORDER (HCC): ICD-10-CM

## 2023-02-20 DIAGNOSIS — N18.30 STAGE 3 CHRONIC KIDNEY DISEASE, UNSPECIFIED WHETHER STAGE 3A OR 3B CKD (HCC): ICD-10-CM

## 2023-02-20 DIAGNOSIS — M48.07 SPINAL STENOSIS, LUMBOSACRAL REGION: Primary | ICD-10-CM

## 2023-02-20 PROCEDURE — 1036F TOBACCO NON-USER: CPT | Performed by: INTERNAL MEDICINE

## 2023-02-20 PROCEDURE — G8417 CALC BMI ABV UP PARAM F/U: HCPCS | Performed by: INTERNAL MEDICINE

## 2023-02-20 PROCEDURE — 99214 OFFICE O/P EST MOD 30 MIN: CPT | Performed by: INTERNAL MEDICINE

## 2023-02-20 PROCEDURE — 1123F ACP DISCUSS/DSCN MKR DOCD: CPT | Performed by: INTERNAL MEDICINE

## 2023-02-20 PROCEDURE — G8484 FLU IMMUNIZE NO ADMIN: HCPCS | Performed by: INTERNAL MEDICINE

## 2023-02-20 PROCEDURE — G8427 DOCREV CUR MEDS BY ELIG CLIN: HCPCS | Performed by: INTERNAL MEDICINE

## 2023-02-20 RX ORDER — METHSCOPOLAMINE BROMIDE 2.5 MG/1
TABLET ORAL
COMMUNITY
Start: 2023-01-17

## 2023-02-20 ASSESSMENT — PATIENT HEALTH QUESTIONNAIRE - PHQ9
SUM OF ALL RESPONSES TO PHQ QUESTIONS 1-9: 0
SUM OF ALL RESPONSES TO PHQ QUESTIONS 1-9: 0
1. LITTLE INTEREST OR PLEASURE IN DOING THINGS: 0
2. FEELING DOWN, DEPRESSED OR HOPELESS: 0
SUM OF ALL RESPONSES TO PHQ QUESTIONS 1-9: 0
SUM OF ALL RESPONSES TO PHQ QUESTIONS 1-9: 0
SUM OF ALL RESPONSES TO PHQ9 QUESTIONS 1 & 2: 0

## 2023-02-20 NOTE — PROGRESS NOTES
Delonte Mendez, a male of 82 y.o. presents today for 6 Month Follow-Up    He follows with Dr. Emile Weiss     He has been having issues with his gait. He has been going to PT. He denies chest pain shortness of breath palpitations or edema. He denies any issues or side effects with medications. Diagnoses and all orders for this visit:  Spinal stenosis, lumbosacral region  Stage 3 chronic kidney disease, unspecified whether stage 3a or 3b CKD ([facility]  -     CBC; Future  Low grade myelodysplastic syndrome lesions (HCC)  Type II diabetes mellitus with peripheral circulatory disorder (HCC)  -     Comprehensive Metabolic Panel; Future  -     Hemoglobin A1C; Future  -     Lipid Panel; Future  -     Microalbumin / Creatinine Urine Ratio; Future       Plan    Obtain routine blood work  Continue current medication regimen  Following with Cardiology and Physical Therapy      /62   Pulse 86   Temp 97.4 °F (36.3 °C)   Wt 163 lb (73.9 kg)   SpO2 97%   BMI 26.31 kg/m²     Review of Systems  Constitutional:Negative for activity change, appetite change, chills, fatigue and fever. Respiratory: Negative for choking, chest tightness, shortness of breath and wheezing. Cardiovascular: Negative for chest pain, palpitations and leg swelling. Gastrointestinal: Negative for abdominal distention, constipation, diarrhea, nausea and vomiting. Musculoskeletal: Negative for arthralgias, back pain, gait problem and joint swelling. Neurological: Negative for dizziness, weakness,numbness and headaches.      Patient Active Problem List    Diagnosis Date Noted    Chronic renal disease, stage III ([facility] [117485] 02/20/2023    Influenzal pneumonia 12/03/2022    Hyperlipidemia, mixed 02/04/2019    Acute pancreatitis 03/03/2018    Chest pain 03/02/2018    Lower GI bleed 09/25/2017    Cardiac enzymes elevated 09/11/2017    Postprocedural hypotension 09/11/2017    Lumbar stenosis     Low back pain 07/21/2017    Pain of left lower extremity 07/21/2017    Hypotension     Type 2 diabetes mellitus with diabetic peripheral angiopathy without gangrene, without long-term current use of insulin ([facility]     Pure hypercholesterolemia     Low grade myelodysplastic syndrome lesions ([facility] 06/13/2017    Diabetic peripheral neuropathy ([facility] 05/05/2017    Spinal stenosis, lumbosacral region 03/07/2017    Anal fissure 10/27/2016    OA (osteoarthritis) 10/27/2016    GERD (gastroesophageal reflux disease) 10/27/2016    Non morbid obesity due to excess calories 10/05/2015    H/O total knee replacement 10/05/2015    Anemia of chronic disease 65/49/8753    Diastolic dysfunction, left ventricle 07/15/2015    JANE (dyspnea on exertion) 07/15/2015    Mitral regurgitation 07/15/2015    Over weight 07/15/2015    Coronary artery disease involving native coronary artery of native heart without angina pectoris 09/23/2011    Dyslipidemia 09/23/2011    DM (diabetes mellitus) ([facility] 09/23/2011     Allergies   Allergen Reactions    Iodine Rash     IV Iodine    Benadryl [Diphenhydramine]      IV BENADRYL     Ramipril      cough     Current Outpatient Medications on File Prior to Visit   Medication Sig Dispense Refill    methscopolamine (PAMINE FORTE) 2.5 MG tablet TAKE ONE TABLET BY MOUTH AT BEDTIME      hyoscyamine (LEVSIN/SL) 125 MCG sublingual tablet DISSOLVE ONE TABLET UNDER TONGUE DAILY WITH BREAKFAST      LORazepam (ATIVAN) 1 MG tablet Take 1 tablet by mouth daily as needed for Anxiety (For anxiety and insomnia) for up to 360 days. 15 tablet 4    zolpidem (AMBIEN) 5 MG tablet Take 1 tablet by mouth nightly as needed for Sleep.  10 tablet 3    Handicap Placard MISC by Does not apply route Dx - Gait instability  Duration - 8/27 1 each 0    alirocumab (PRALUENT) 75 MG/ML SOAJ injection pen Inject 1 mL into the skin every 14 days 2.24 mL 6    empagliflozin (JARDIANCE) 10 MG tablet Take 1 tablet by mouth daily 30 tablet 6    isosorbide mononitrate (IMDUR) 30 MG extended release tablet TAKE ONE TABLET BY MOUTH DAILY 90 tablet 3    allopurinol (ZYLOPRIM) 300 MG tablet TAKE ONE TABLET BY MOUTH EVERY DAY 90 tablet 3    carvedilol (COREG) 3.125 MG tablet TAKE ONE TABLET BY MOUTH TWO TIMES A  tablet 0    vitamin D (ERGOCALCIFEROL) 1.25 MG (94425 UT) CAPS capsule Take 50,000 Units by mouth every 30 days      patiromer sorbitex calcium (VELTASSA) 8.4 g PACK packet Take 8.4 g by mouth every other day      sodium bicarbonate 650 MG tablet Take 650 mg by mouth 2 times daily      tamsulosin (FLOMAX) 0.4 MG capsule Take 0.4 mg by mouth daily      ezetimibe (ZETIA) 10 MG tablet Take 1 tablet by mouth daily 30 tablet 6    metFORMIN (GLUCOPHAGE) 1000 MG tablet Take 1 tablet by mouth 2 times daily (with meals) 180 tablet 3    nateglinide (STARLIX) 120 MG tablet TAKE ONE TABLET BY MOUTH THREE TIMES A DAY BEFORE MEALS 270 tablet 1    atorvastatin (LIPITOR) 80 MG tablet Take 80 mg by mouth daily       glucose monitoring kit (FREESTYLE) monitoring kit 1 kit by Does not apply route daily as needed (as directed) 1 kit 0    blood glucose test strips (ASCENSIA AUTODISC VI;ONE TOUCH ULTRA TEST VI) strip 1 each by In Vitro route daily As needed. One Touch Ultra 2 Test strips DX: E11.9 100 each 3    Blood Glucose Calibration (OT ULTRA/FASTTK CNT SOLN) SOLN 1 Bottle by In Vitro route as needed (as directed) 1 each 3    docusate sodium (COLACE) 100 MG capsule Take 1 capsule by mouth daily as needed for Constipation      omeprazole (PRILOSEC) 20 MG delayed release capsule Take 20 mg by mouth daily PRN      nitroGLYCERIN (NITROSTAT) 0.4 MG SL tablet Place 1 tablet under the tongue every 5 minutes as needed for Chest pain 25 tablet 3    aspirin 81 MG EC tablet Take 81 mg by mouth daily. vitamin B-12 (CYANOCOBALAMIN) 1000 MCG tablet Take 1,000 mcg by mouth daily. folic acid (FOLVITE) 397 MCG tablet Take 400 mcg by mouth daily        No current facility-administered medications on file prior to visit. Physical Exam   Constitutional:  Oriented to person, place, and time. Appears well-developed and well-nourished. No acute distress. HENT: No sinus tenderness or lymphadenopathy  Head: Normocephalic and atraumatic. Eyes: Eyes exhibits no discharge. No scleral icterus present. Neck: No tracheal deviation present. No thyromegaly present. Cardiovascular: Normal rate, regular rhythm, normal heart sounds and intact distal pulses. Exam reveals no gallop nor friction rub. No murmur heard. Pulmonary: Effort normal and breath sounds normal. No respiratory distress. No wheezes or rales. Abdomen: No signs of rigidity rebound or organomegaly  Musculoskeletal:  No tenderness to palpation  Neurological:Alert and oriented to person, place, and time. Skin: No diaphoresis. Psychiatric: Normal mood and affect. Behavior is Normal.     ASSESSMENT AND PLAN:        Return in about 6 months (around 8/20/2023). Electronically signed by Rand Vieira DO on 2/21/2023 at 12:35 PM    Rand Vieira DO    Assessment  Diagnoses and all orders for this visit:  Spinal stenosis, lumbosacral region  Stage 3 chronic kidney disease, unspecified whether stage 3a or 3b CKD ([facility] [address]  -     CBC; Future  Low grade myelodysplastic syndrome lesions (HCC)  Type II diabetes mellitus with peripheral circulatory disorder (HCC)  -     Comprehensive Metabolic Panel; Future  -     Hemoglobin A1C; Future  -     Lipid Panel; Future  -     Microalbumin / Creatinine Urine Ratio;  Future

## 2023-02-22 DIAGNOSIS — E11.51 TYPE II DIABETES MELLITUS WITH PERIPHERAL CIRCULATORY DISORDER (HCC): ICD-10-CM

## 2023-02-22 DIAGNOSIS — N18.30 STAGE 3 CHRONIC KIDNEY DISEASE, UNSPECIFIED WHETHER STAGE 3A OR 3B CKD (HCC): ICD-10-CM

## 2023-02-22 LAB
ALBUMIN SERPL-MCNC: 4.3 G/DL (ref 3.5–5.2)
ALP BLD-CCNC: 42 U/L (ref 40–129)
ALT SERPL-CCNC: 16 U/L (ref 0–40)
ANION GAP SERPL CALCULATED.3IONS-SCNC: 12 MMOL/L (ref 7–16)
AST SERPL-CCNC: 30 U/L (ref 0–39)
BILIRUB SERPL-MCNC: 0.4 MG/DL (ref 0–1.2)
BILIRUBIN URINE: NEGATIVE
BLOOD, URINE: NEGATIVE
BUN BLDV-MCNC: 18 MG/DL (ref 6–23)
CALCIUM SERPL-MCNC: 9.1 MG/DL (ref 8.6–10.2)
CHLORIDE BLD-SCNC: 102 MMOL/L (ref 98–107)
CHOLESTEROL, TOTAL: 88 MG/DL (ref 0–199)
CLARITY: CLEAR
CO2: 22 MMOL/L (ref 22–29)
COLOR: YELLOW
CREAT SERPL-MCNC: 1.2 MG/DL (ref 0.7–1.2)
CREATININE URINE: 41 MG/DL (ref 40–278)
GFR SERPL CREATININE-BSD FRML MDRD: 59 ML/MIN/1.73
GLUCOSE BLD-MCNC: 98 MG/DL (ref 74–99)
GLUCOSE URINE: NEGATIVE MG/DL
HBA1C MFR BLD: 6.1 % (ref 4–5.6)
HCT VFR BLD CALC: 34.3 % (ref 37–54)
HDLC SERPL-MCNC: 38 MG/DL
HEMOGLOBIN: 10.7 G/DL (ref 12.5–16.5)
KETONES, URINE: NEGATIVE MG/DL
LDL CHOLESTEROL CALCULATED: 36 MG/DL (ref 0–99)
LEUKOCYTE ESTERASE, URINE: NEGATIVE
MCH RBC QN AUTO: 27.1 PG (ref 26–35)
MCHC RBC AUTO-ENTMCNC: 31.2 % (ref 32–34.5)
MCV RBC AUTO: 86.8 FL (ref 80–99.9)
MICROALBUMIN UR-MCNC: <12 MG/L
MICROALBUMIN/CREAT UR-RTO: ABNORMAL (ref 0–30)
NITRITE, URINE: NEGATIVE
PDW BLD-RTO: 16.1 FL (ref 11.5–15)
PH UA: 6 (ref 5–9)
PLATELET # BLD: 393 E9/L (ref 130–450)
PMV BLD AUTO: 13 FL (ref 7–12)
POTASSIUM SERPL-SCNC: 5 MMOL/L (ref 3.5–5)
PROTEIN UA: NEGATIVE MG/DL
RBC # BLD: 3.95 E12/L (ref 3.8–5.8)
SODIUM BLD-SCNC: 136 MMOL/L (ref 132–146)
SPECIFIC GRAVITY UA: <=1.005 (ref 1–1.03)
TOTAL PROTEIN: 8.1 G/DL (ref 6.4–8.3)
TRIGL SERPL-MCNC: 70 MG/DL (ref 0–149)
UROBILINOGEN, URINE: 0.2 E.U./DL
VLDLC SERPL CALC-MCNC: 14 MG/DL
WBC # BLD: 12.1 E9/L (ref 4.5–11.5)

## 2023-04-24 LAB
ALBUMIN SERPL-MCNC: 4.3 G/DL (ref 3.5–5.2)
ALP SERPL-CCNC: 43 U/L (ref 40–129)
ALT SERPL-CCNC: 16 U/L (ref 0–40)
ANION GAP SERPL CALCULATED.3IONS-SCNC: 22 MMOL/L (ref 7–16)
AST SERPL-CCNC: 33 U/L (ref 0–39)
BILIRUB SERPL-MCNC: 0.3 MG/DL (ref 0–1.2)
BUN SERPL-MCNC: 18 MG/DL (ref 6–23)
CALCIUM SERPL-MCNC: 9.6 MG/DL (ref 8.6–10.2)
CHLORIDE SERPL-SCNC: 99 MMOL/L (ref 98–107)
CO2 SERPL-SCNC: 15 MMOL/L (ref 22–29)
CREAT SERPL-MCNC: 1.3 MG/DL (ref 0.7–1.2)
ERYTHROCYTE [DISTWIDTH] IN BLOOD BY AUTOMATED COUNT: 16.9 FL (ref 11.5–15)
GLUCOSE SERPL-MCNC: 95 MG/DL (ref 74–99)
HCT VFR BLD AUTO: 38.7 % (ref 37–54)
HGB BLD-MCNC: 11.4 G/DL (ref 12.5–16.5)
MCH RBC QN AUTO: 27.3 PG (ref 26–35)
MCHC RBC AUTO-ENTMCNC: 29.5 % (ref 32–34.5)
MCV RBC AUTO: 92.8 FL (ref 80–99.9)
PHOSPHATE SERPL-MCNC: 4.2 MG/DL (ref 2.5–4.5)
PLATELET # BLD AUTO: 383 E9/L (ref 130–450)
PMV BLD AUTO: 13.2 FL (ref 7–12)
POTASSIUM SERPL-SCNC: 5.2 MMOL/L (ref 3.5–5)
PROT SERPL-MCNC: 8.6 G/DL (ref 6.4–8.3)
PSA SERPL-MCNC: 0.93 NG/ML (ref 0–4)
PTH-INTACT SERPL-MCNC: 29 PG/ML (ref 15–65)
RBC # BLD AUTO: 4.17 E12/L (ref 3.8–5.8)
SODIUM SERPL-SCNC: 136 MMOL/L (ref 132–146)
VITAMIN D 25-HYDROXY: 29 NG/ML (ref 30–100)
WBC # BLD: 12.9 E9/L (ref 4.5–11.5)

## 2023-05-24 ENCOUNTER — OFFICE VISIT (OUTPATIENT)
Dept: CARDIOLOGY CLINIC | Age: 88
End: 2023-05-24
Payer: MEDICARE

## 2023-05-24 VITALS
HEART RATE: 74 BPM | DIASTOLIC BLOOD PRESSURE: 62 MMHG | OXYGEN SATURATION: 96 % | BODY MASS INDEX: 26.03 KG/M2 | SYSTOLIC BLOOD PRESSURE: 116 MMHG | HEIGHT: 66 IN | RESPIRATION RATE: 16 BRPM | WEIGHT: 162 LBS

## 2023-05-24 DIAGNOSIS — I34.0 NONRHEUMATIC MITRAL VALVE REGURGITATION: ICD-10-CM

## 2023-05-24 DIAGNOSIS — I10 ESSENTIAL HYPERTENSION: ICD-10-CM

## 2023-05-24 DIAGNOSIS — I25.10 CORONARY ARTERY DISEASE INVOLVING NATIVE CORONARY ARTERY OF NATIVE HEART WITHOUT ANGINA PECTORIS: Primary | ICD-10-CM

## 2023-05-24 DIAGNOSIS — E78.5 DYSLIPIDEMIA: ICD-10-CM

## 2023-05-24 PROCEDURE — 3078F DIAST BP <80 MM HG: CPT | Performed by: INTERNAL MEDICINE

## 2023-05-24 PROCEDURE — G8427 DOCREV CUR MEDS BY ELIG CLIN: HCPCS | Performed by: INTERNAL MEDICINE

## 2023-05-24 PROCEDURE — G8417 CALC BMI ABV UP PARAM F/U: HCPCS | Performed by: INTERNAL MEDICINE

## 2023-05-24 PROCEDURE — 93000 ELECTROCARDIOGRAM COMPLETE: CPT | Performed by: INTERNAL MEDICINE

## 2023-05-24 PROCEDURE — 99213 OFFICE O/P EST LOW 20 MIN: CPT | Performed by: INTERNAL MEDICINE

## 2023-05-24 PROCEDURE — 1036F TOBACCO NON-USER: CPT | Performed by: INTERNAL MEDICINE

## 2023-05-24 PROCEDURE — 3074F SYST BP LT 130 MM HG: CPT | Performed by: INTERNAL MEDICINE

## 2023-05-24 PROCEDURE — 1123F ACP DISCUSS/DSCN MKR DOCD: CPT | Performed by: INTERNAL MEDICINE

## 2023-05-24 NOTE — PROGRESS NOTES
OFFICE VISIT     PRIMARY CARE PHYSICIAN:      Jarett Wilkins DO       ALLERGIES / SENSITIVITIES:        Allergies   Allergen Reactions    Iodine Rash     IV Iodine    Benadryl [Diphenhydramine]      IV BENADRYL     Ramipril      cough          REVIEWED MEDICATIONS:        Current Outpatient Medications:     LORazepam (ATIVAN) 1 MG tablet, Take 1 tablet by mouth daily as needed for Anxiety (For anxiety and insomnia) for up to 360 days. , Disp: 15 tablet, Rfl: 4    zolpidem (AMBIEN) 5 MG tablet, Take 1 tablet by mouth nightly as needed for Sleep., Disp: 10 tablet, Rfl: 3    Handicap Placard MISC, by Does not apply route Dx - Gait instability Duration - 8/27, Disp: 1 each, Rfl: 0    alirocumab (PRALUENT) 75 MG/ML SOAJ injection pen, Inject 1 mL into the skin every 14 days, Disp: 2.24 mL, Rfl: 6    isosorbide mononitrate (IMDUR) 30 MG extended release tablet, TAKE ONE TABLET BY MOUTH DAILY, Disp: 90 tablet, Rfl: 3    allopurinol (ZYLOPRIM) 300 MG tablet, TAKE ONE TABLET BY MOUTH EVERY DAY, Disp: 90 tablet, Rfl: 3    carvedilol (COREG) 3.125 MG tablet, TAKE ONE TABLET BY MOUTH TWO TIMES A DAY, Disp: 180 tablet, Rfl: 0    vitamin D (ERGOCALCIFEROL) 1.25 MG (42845 UT) CAPS capsule, Take 1 capsule by mouth every 30 days, Disp: , Rfl:     sodium bicarbonate 650 MG tablet, Take 1 tablet by mouth 2 times daily, Disp: , Rfl:     tamsulosin (FLOMAX) 0.4 MG capsule, Take 1 capsule by mouth daily, Disp: , Rfl:     ezetimibe (ZETIA) 10 MG tablet, Take 1 tablet by mouth daily, Disp: 30 tablet, Rfl: 6    metFORMIN (GLUCOPHAGE) 1000 MG tablet, Take 1 tablet by mouth 2 times daily (with meals), Disp: 180 tablet, Rfl: 3    nateglinide (STARLIX) 120 MG tablet, TAKE ONE TABLET BY MOUTH THREE TIMES A DAY BEFORE MEALS, Disp: 270 tablet, Rfl: 1    atorvastatin (LIPITOR) 80 MG tablet, Take 1 tablet by mouth daily, Disp: , Rfl:     glucose monitoring kit (FREESTYLE) monitoring kit, 1 kit by Does not apply route daily as needed (as

## 2023-05-30 ENCOUNTER — APPOINTMENT (OUTPATIENT)
Dept: GENERAL RADIOLOGY | Age: 88
End: 2023-05-30
Attending: EMERGENCY MEDICINE
Payer: MEDICARE

## 2023-05-30 ENCOUNTER — HOSPITAL ENCOUNTER (INPATIENT)
Age: 88
LOS: 3 days | Discharge: HOME | End: 2023-06-02
Attending: EMERGENCY MEDICINE | Admitting: INTERNAL MEDICINE
Payer: MEDICARE

## 2023-05-30 DIAGNOSIS — U07.1 COVID-19 VIRUS INFECTION: ICD-10-CM

## 2023-05-30 DIAGNOSIS — R55 SYNCOPE AND COLLAPSE: ICD-10-CM

## 2023-05-30 DIAGNOSIS — N28.9 ACUTE RENAL INSUFFICIENCY: ICD-10-CM

## 2023-05-30 DIAGNOSIS — J96.01 ACUTE RESPIRATORY FAILURE WITH HYPOXIA (HCC): Primary | ICD-10-CM

## 2023-05-30 PROBLEM — N25.89 RENAL TUBULAR ACIDOSIS: Status: ACTIVE | Noted: 2023-05-30

## 2023-05-30 PROBLEM — N18.31 STAGE 3A CHRONIC KIDNEY DISEASE (CKD) (HCC): Status: ACTIVE | Noted: 2023-02-20

## 2023-05-30 LAB
ALBUMIN SERPL-MCNC: 3.9 G/DL (ref 3.5–5.2)
ALP SERPL-CCNC: 31 U/L (ref 40–129)
ALT SERPL-CCNC: 20 U/L (ref 0–40)
ANION GAP SERPL CALCULATED.3IONS-SCNC: 11 MMOL/L (ref 7–16)
AST SERPL-CCNC: 24 U/L (ref 0–39)
BACTERIA URNS QL MICRO: ABNORMAL /HPF
BILIRUB DIRECT SERPL-MCNC: <0.2 MG/DL (ref 0–0.3)
BILIRUB INDIRECT SERPL-MCNC: ABNORMAL MG/DL (ref 0–1)
BILIRUB SERPL-MCNC: 0.3 MG/DL (ref 0–1.2)
BILIRUB UR QL STRIP: NEGATIVE
BUN SERPL-MCNC: 21 MG/DL (ref 6–23)
CALCIUM SERPL-MCNC: 8.6 MG/DL (ref 8.6–10.2)
CHLORIDE SERPL-SCNC: 101 MMOL/L (ref 98–107)
CHP ED QC CHECK: NORMAL
CLARITY UR: CLEAR
CO2 SERPL-SCNC: 23 MMOL/L (ref 22–29)
COLOR UR: YELLOW
CREAT SERPL-MCNC: 1.5 MG/DL (ref 0.7–1.2)
CRP SERPL HS-MCNC: 3.7 MG/DL (ref 0–0.4)
D DIMER: 303 NG/ML DDU
EKG ATRIAL RATE: 73 BPM
EKG P AXIS: 59 DEGREES
EKG P-R INTERVAL: 170 MS
EKG Q-T INTERVAL: 366 MS
EKG QRS DURATION: 84 MS
EKG QTC CALCULATION (BAZETT): 403 MS
EKG R AXIS: -12 DEGREES
EKG T AXIS: 38 DEGREES
EKG VENTRICULAR RATE: 73 BPM
ERYTHROCYTE [DISTWIDTH] IN BLOOD BY AUTOMATED COUNT: 16.3 FL (ref 11.5–15)
GLUCOSE BLD-MCNC: 137 MG/DL
GLUCOSE SERPL-MCNC: 135 MG/DL (ref 74–99)
GLUCOSE UR STRIP-MCNC: 250 MG/DL
HCT VFR BLD AUTO: 34.5 % (ref 37–54)
HGB BLD-MCNC: 10.7 G/DL (ref 12.5–16.5)
HGB UR QL STRIP: NEGATIVE
INFLUENZA A BY PCR: NOT DETECTED
INFLUENZA B BY PCR: NOT DETECTED
KETONES UR STRIP-MCNC: NEGATIVE MG/DL
LACTATE BLDV-SCNC: 1.8 MMOL/L (ref 0.5–2.2)
LDH SERPL-CCNC: 160 U/L (ref 135–225)
LEUKOCYTE ESTERASE UR QL STRIP: NEGATIVE
LIPASE: 84 U/L (ref 13–60)
MCH RBC QN AUTO: 27.7 PG (ref 26–35)
MCHC RBC AUTO-ENTMCNC: 31 % (ref 32–34.5)
MCV RBC AUTO: 89.4 FL (ref 80–99.9)
METER GLUCOSE: 112 MG/DL (ref 74–99)
METER GLUCOSE: 137 MG/DL (ref 74–99)
METER GLUCOSE: 228 MG/DL (ref 74–99)
NITRITE UR QL STRIP: NEGATIVE
PH UR STRIP: 6 [PH] (ref 5–9)
PLATELET # BLD AUTO: 238 E9/L (ref 130–450)
PMV BLD AUTO: 13.1 FL (ref 7–12)
POTASSIUM SERPL-SCNC: 4.1 MMOL/L (ref 3.5–5)
PROT SERPL-MCNC: 7.9 G/DL (ref 6.4–8.3)
PROT UR STRIP-MCNC: NEGATIVE MG/DL
RBC # BLD AUTO: 3.86 E12/L (ref 3.8–5.8)
RBC #/AREA URNS HPF: ABNORMAL /HPF (ref 0–2)
SARS-COV-2 RDRP RESP QL NAA+PROBE: DETECTED
SODIUM SERPL-SCNC: 135 MMOL/L (ref 132–146)
SP GR UR STRIP: <=1.005 (ref 1–1.03)
STREP GRP A PCR: NEGATIVE
TROPONIN, HIGH SENSITIVITY: 20 NG/L (ref 0–11)
TSH SERPL-MCNC: 1.84 UIU/ML (ref 0.27–4.2)
UROBILINOGEN UR STRIP-ACNC: 0.2 E.U./DL
WBC # BLD: 24.4 E9/L (ref 4.5–11.5)
WBC #/AREA URNS HPF: ABNORMAL /HPF (ref 0–5)

## 2023-05-30 PROCEDURE — 87502 INFLUENZA DNA AMP PROBE: CPT

## 2023-05-30 PROCEDURE — 83615 LACTATE (LD) (LDH) ENZYME: CPT

## 2023-05-30 PROCEDURE — 6360000002 HC RX W HCPCS: Performed by: EMERGENCY MEDICINE

## 2023-05-30 PROCEDURE — 85378 FIBRIN DEGRADE SEMIQUANT: CPT

## 2023-05-30 PROCEDURE — 36415 COLL VENOUS BLD VENIPUNCTURE: CPT

## 2023-05-30 PROCEDURE — 87880 STREP A ASSAY W/OPTIC: CPT

## 2023-05-30 PROCEDURE — 80048 BASIC METABOLIC PNL TOTAL CA: CPT

## 2023-05-30 PROCEDURE — 6360000002 HC RX W HCPCS: Performed by: INTERNAL MEDICINE

## 2023-05-30 PROCEDURE — 99285 EMERGENCY DEPT VISIT HI MDM: CPT

## 2023-05-30 PROCEDURE — 6370000000 HC RX 637 (ALT 250 FOR IP): Performed by: INTERNAL MEDICINE

## 2023-05-30 PROCEDURE — 2580000003 HC RX 258: Performed by: EMERGENCY MEDICINE

## 2023-05-30 PROCEDURE — 96374 THER/PROPH/DIAG INJ IV PUSH: CPT

## 2023-05-30 PROCEDURE — 83605 ASSAY OF LACTIC ACID: CPT

## 2023-05-30 PROCEDURE — XW0G7M6 INTRODUCTION OF BARICITINIB INTO UPPER GI, VIA NATURAL OR ARTIFICIAL OPENING, NEW TECHNOLOGY GROUP 6: ICD-10-PCS | Performed by: INTERNAL MEDICINE

## 2023-05-30 PROCEDURE — 84443 ASSAY THYROID STIM HORMONE: CPT

## 2023-05-30 PROCEDURE — 86140 C-REACTIVE PROTEIN: CPT

## 2023-05-30 PROCEDURE — 87635 SARS-COV-2 COVID-19 AMP PRB: CPT

## 2023-05-30 PROCEDURE — 94640 AIRWAY INHALATION TREATMENT: CPT

## 2023-05-30 PROCEDURE — 83690 ASSAY OF LIPASE: CPT

## 2023-05-30 PROCEDURE — 85027 COMPLETE CBC AUTOMATED: CPT

## 2023-05-30 PROCEDURE — 1200000000 HC SEMI PRIVATE

## 2023-05-30 PROCEDURE — 80076 HEPATIC FUNCTION PANEL: CPT

## 2023-05-30 PROCEDURE — 81001 URINALYSIS AUTO W/SCOPE: CPT

## 2023-05-30 PROCEDURE — 71045 X-RAY EXAM CHEST 1 VIEW: CPT

## 2023-05-30 PROCEDURE — 93005 ELECTROCARDIOGRAM TRACING: CPT | Performed by: EMERGENCY MEDICINE

## 2023-05-30 PROCEDURE — 2580000003 HC RX 258: Performed by: INTERNAL MEDICINE

## 2023-05-30 PROCEDURE — 93010 ELECTROCARDIOGRAM REPORT: CPT | Performed by: INTERNAL MEDICINE

## 2023-05-30 PROCEDURE — 82962 GLUCOSE BLOOD TEST: CPT

## 2023-05-30 PROCEDURE — 96361 HYDRATE IV INFUSION ADD-ON: CPT

## 2023-05-30 PROCEDURE — 84484 ASSAY OF TROPONIN QUANT: CPT

## 2023-05-30 PROCEDURE — 99223 1ST HOSP IP/OBS HIGH 75: CPT | Performed by: INTERNAL MEDICINE

## 2023-05-30 RX ORDER — INSULIN LISPRO 100 [IU]/ML
0-4 INJECTION, SOLUTION INTRAVENOUS; SUBCUTANEOUS
Status: DISCONTINUED | OUTPATIENT
Start: 2023-05-30 | End: 2023-05-31

## 2023-05-30 RX ORDER — PANTOPRAZOLE SODIUM 40 MG/1
40 TABLET, DELAYED RELEASE ORAL
Status: DISCONTINUED | OUTPATIENT
Start: 2023-05-31 | End: 2023-06-02 | Stop reason: HOSPADM

## 2023-05-30 RX ORDER — ATORVASTATIN CALCIUM 40 MG/1
80 TABLET, FILM COATED ORAL DAILY
Status: DISCONTINUED | OUTPATIENT
Start: 2023-05-30 | End: 2023-06-02 | Stop reason: HOSPADM

## 2023-05-30 RX ORDER — SODIUM CHLORIDE 0.9 % (FLUSH) 0.9 %
5-40 SYRINGE (ML) INJECTION PRN
Status: DISCONTINUED | OUTPATIENT
Start: 2023-05-30 | End: 2023-06-02 | Stop reason: HOSPADM

## 2023-05-30 RX ORDER — ONDANSETRON 2 MG/ML
4 INJECTION INTRAMUSCULAR; INTRAVENOUS EVERY 6 HOURS PRN
Status: DISCONTINUED | OUTPATIENT
Start: 2023-05-30 | End: 2023-06-02 | Stop reason: HOSPADM

## 2023-05-30 RX ORDER — SODIUM BICARBONATE 650 MG/1
650 TABLET ORAL 2 TIMES DAILY
Status: DISCONTINUED | OUTPATIENT
Start: 2023-05-30 | End: 2023-06-02 | Stop reason: HOSPADM

## 2023-05-30 RX ORDER — POLYETHYLENE GLYCOL 3350 17 G/17G
17 POWDER, FOR SOLUTION ORAL DAILY PRN
Status: DISCONTINUED | OUTPATIENT
Start: 2023-05-30 | End: 2023-06-02 | Stop reason: HOSPADM

## 2023-05-30 RX ORDER — BUDESONIDE 0.5 MG/2ML
0.5 INHALANT ORAL 2 TIMES DAILY
Status: DISCONTINUED | OUTPATIENT
Start: 2023-05-30 | End: 2023-06-02 | Stop reason: HOSPADM

## 2023-05-30 RX ORDER — LANOLIN ALCOHOL/MO/W.PET/CERES
400 CREAM (GRAM) TOPICAL DAILY
Status: DISCONTINUED | OUTPATIENT
Start: 2023-05-30 | End: 2023-05-30 | Stop reason: CLARIF

## 2023-05-30 RX ORDER — ISOSORBIDE MONONITRATE 30 MG/1
30 TABLET, EXTENDED RELEASE ORAL DAILY
Status: DISCONTINUED | OUTPATIENT
Start: 2023-05-30 | End: 2023-06-02 | Stop reason: HOSPADM

## 2023-05-30 RX ORDER — SODIUM CHLORIDE 0.9 % (FLUSH) 0.9 %
10 SYRINGE (ML) INJECTION PRN
Status: DISCONTINUED | OUTPATIENT
Start: 2023-05-30 | End: 2023-06-02 | Stop reason: HOSPADM

## 2023-05-30 RX ORDER — EZETIMIBE 10 MG/1
10 TABLET ORAL DAILY
Status: DISCONTINUED | OUTPATIENT
Start: 2023-05-30 | End: 2023-06-02 | Stop reason: HOSPADM

## 2023-05-30 RX ORDER — ALBUTEROL SULFATE 2.5 MG/3ML
2.5 SOLUTION RESPIRATORY (INHALATION) 4 TIMES DAILY
Status: DISCONTINUED | OUTPATIENT
Start: 2023-05-30 | End: 2023-06-02 | Stop reason: HOSPADM

## 2023-05-30 RX ORDER — SODIUM CHLORIDE 0.9 % (FLUSH) 0.9 %
5-40 SYRINGE (ML) INJECTION EVERY 12 HOURS SCHEDULED
Status: DISCONTINUED | OUTPATIENT
Start: 2023-05-30 | End: 2023-06-02 | Stop reason: HOSPADM

## 2023-05-30 RX ORDER — SODIUM CHLORIDE, SODIUM LACTATE, POTASSIUM CHLORIDE, CALCIUM CHLORIDE 600; 310; 30; 20 MG/100ML; MG/100ML; MG/100ML; MG/100ML
INJECTION, SOLUTION INTRAVENOUS CONTINUOUS
Status: ACTIVE | OUTPATIENT
Start: 2023-05-30 | End: 2023-05-31

## 2023-05-30 RX ORDER — SODIUM CHLORIDE 9 MG/ML
25 INJECTION, SOLUTION INTRAVENOUS PRN
Status: DISCONTINUED | OUTPATIENT
Start: 2023-05-30 | End: 2023-06-02 | Stop reason: HOSPADM

## 2023-05-30 RX ORDER — INSULIN LISPRO 100 [IU]/ML
0-4 INJECTION, SOLUTION INTRAVENOUS; SUBCUTANEOUS NIGHTLY
Status: DISCONTINUED | OUTPATIENT
Start: 2023-05-30 | End: 2023-05-31

## 2023-05-30 RX ORDER — ACETAMINOPHEN 650 MG/1
650 SUPPOSITORY RECTAL EVERY 6 HOURS PRN
Status: DISCONTINUED | OUTPATIENT
Start: 2023-05-30 | End: 2023-06-02 | Stop reason: HOSPADM

## 2023-05-30 RX ORDER — 0.9 % SODIUM CHLORIDE 0.9 %
1000 INTRAVENOUS SOLUTION INTRAVENOUS ONCE
Status: COMPLETED | OUTPATIENT
Start: 2023-05-30 | End: 2023-05-30

## 2023-05-30 RX ORDER — ACETAMINOPHEN 325 MG/1
650 TABLET ORAL EVERY 6 HOURS PRN
Status: DISCONTINUED | OUTPATIENT
Start: 2023-05-30 | End: 2023-06-02 | Stop reason: HOSPADM

## 2023-05-30 RX ORDER — DEXTROSE MONOHYDRATE 100 MG/ML
INJECTION, SOLUTION INTRAVENOUS CONTINUOUS PRN
Status: DISCONTINUED | OUTPATIENT
Start: 2023-05-30 | End: 2023-06-02 | Stop reason: HOSPADM

## 2023-05-30 RX ORDER — DEXAMETHASONE SODIUM PHOSPHATE 10 MG/ML
6 INJECTION INTRAMUSCULAR; INTRAVENOUS ONCE
Status: COMPLETED | OUTPATIENT
Start: 2023-05-30 | End: 2023-05-30

## 2023-05-30 RX ORDER — ONDANSETRON 4 MG/1
4 TABLET, ORALLY DISINTEGRATING ORAL EVERY 8 HOURS PRN
Status: DISCONTINUED | OUTPATIENT
Start: 2023-05-30 | End: 2023-06-02 | Stop reason: HOSPADM

## 2023-05-30 RX ORDER — NITROGLYCERIN 0.4 MG/1
0.4 TABLET SUBLINGUAL EVERY 5 MIN PRN
Status: DISCONTINUED | OUTPATIENT
Start: 2023-05-30 | End: 2023-06-02 | Stop reason: HOSPADM

## 2023-05-30 RX ORDER — CARVEDILOL 3.12 MG/1
3.12 TABLET ORAL 2 TIMES DAILY
Status: DISCONTINUED | OUTPATIENT
Start: 2023-05-30 | End: 2023-06-02 | Stop reason: HOSPADM

## 2023-05-30 RX ORDER — ASPIRIN 81 MG/1
81 TABLET ORAL DAILY
Status: DISCONTINUED | OUTPATIENT
Start: 2023-05-30 | End: 2023-06-02 | Stop reason: HOSPADM

## 2023-05-30 RX ORDER — DOCUSATE SODIUM 100 MG/1
100 CAPSULE, LIQUID FILLED ORAL DAILY PRN
Status: DISCONTINUED | OUTPATIENT
Start: 2023-05-30 | End: 2023-06-02 | Stop reason: HOSPADM

## 2023-05-30 RX ORDER — LANOLIN ALCOHOL/MO/W.PET/CERES
1000 CREAM (GRAM) TOPICAL DAILY
Status: DISCONTINUED | OUTPATIENT
Start: 2023-05-30 | End: 2023-06-02 | Stop reason: HOSPADM

## 2023-05-30 RX ORDER — DEXAMETHASONE SODIUM PHOSPHATE 10 MG/ML
6 INJECTION INTRAMUSCULAR; INTRAVENOUS EVERY 24 HOURS
Status: DISCONTINUED | OUTPATIENT
Start: 2023-05-30 | End: 2023-06-02 | Stop reason: HOSPADM

## 2023-05-30 RX ORDER — TAMSULOSIN HYDROCHLORIDE 0.4 MG/1
0.4 CAPSULE ORAL DAILY
Status: DISCONTINUED | OUTPATIENT
Start: 2023-05-30 | End: 2023-06-02 | Stop reason: HOSPADM

## 2023-05-30 RX ORDER — ENOXAPARIN SODIUM 100 MG/ML
40 INJECTION SUBCUTANEOUS DAILY
Status: DISCONTINUED | OUTPATIENT
Start: 2023-05-30 | End: 2023-06-02 | Stop reason: HOSPADM

## 2023-05-30 RX ORDER — FOLIC ACID 1 MG/1
1 TABLET ORAL DAILY
Status: DISCONTINUED | OUTPATIENT
Start: 2023-05-31 | End: 2023-06-02 | Stop reason: HOSPADM

## 2023-05-30 RX ORDER — ALLOPURINOL 300 MG/1
300 TABLET ORAL DAILY
Status: DISCONTINUED | OUTPATIENT
Start: 2023-05-30 | End: 2023-06-02 | Stop reason: HOSPADM

## 2023-05-30 RX ADMIN — BARICITINIB 2 MG: 2 TABLET, FILM COATED ORAL at 18:18

## 2023-05-30 RX ADMIN — ENOXAPARIN SODIUM 40 MG: 100 INJECTION SUBCUTANEOUS at 18:22

## 2023-05-30 RX ADMIN — ALBUTEROL SULFATE 2.5 MG: 2.5 SOLUTION RESPIRATORY (INHALATION) at 16:24

## 2023-05-30 RX ADMIN — BUDESONIDE 500 MCG: 0.5 SUSPENSION RESPIRATORY (INHALATION) at 20:03

## 2023-05-30 RX ADMIN — ALBUTEROL SULFATE 2.5 MG: 2.5 SOLUTION RESPIRATORY (INHALATION) at 20:03

## 2023-05-30 RX ADMIN — SODIUM CHLORIDE 1000 ML: 9 INJECTION, SOLUTION INTRAVENOUS at 12:23

## 2023-05-30 RX ADMIN — SODIUM BICARBONATE 650 MG: 650 TABLET ORAL at 20:40

## 2023-05-30 RX ADMIN — SODIUM CHLORIDE, POTASSIUM CHLORIDE, SODIUM LACTATE AND CALCIUM CHLORIDE: 600; 310; 30; 20 INJECTION, SOLUTION INTRAVENOUS at 18:20

## 2023-05-30 RX ADMIN — DEXAMETHASONE SODIUM PHOSPHATE 6 MG: 10 INJECTION INTRAMUSCULAR; INTRAVENOUS at 18:18

## 2023-05-30 RX ADMIN — DEXAMETHASONE SODIUM PHOSPHATE 6 MG: 10 INJECTION INTRAMUSCULAR; INTRAVENOUS at 13:51

## 2023-05-30 ASSESSMENT — ENCOUNTER SYMPTOMS
VOMITING: 1
SORE THROAT: 1
BACK PAIN: 0
COUGH: 0
DIARRHEA: 1
ABDOMINAL PAIN: 0
SHORTNESS OF BREATH: 0
NAUSEA: 1
WHEEZING: 0

## 2023-05-30 ASSESSMENT — PAIN SCALES - GENERAL: PAINLEVEL_OUTOF10: 0

## 2023-05-30 NOTE — ED NOTES
ED to Inpatient Handoff Report    Notified Delfino Hough that electronic handoff available and patient ready for transport to room 531. Safety Risks: Risk of falls    Patient in Restraints: no    Constant Observer or Patient : no    Telemetry Monitoring Ordered :Yes           Order to transfer to unit without monitor:YES    Last MEWS: 1 Time completed: 1445    Vitals:    05/30/23 1136 05/30/23 1233 05/30/23 1351 05/30/23 1445   BP:  (!) 106/56 (!) 99/55 (!) 102/57   Pulse:  73 69 72   Resp:  18 20 20   Temp:    98.5 °F (36.9 °C)   TempSrc:    Oral   SpO2: 94% 97% 100% 100%   Weight:       Height:           Opportunity for questions and clarification was provided.          Melanie Hou RN  05/30/23 [id number]

## 2023-05-30 NOTE — ED PROVIDER NOTES
(coronary artery disease), Diabetes mellitus ([facility] GERD (gastroesophageal reflux disease), Gout, History of blood transfusion, Hyperkalemia, Hyperlipidemia, Low back pain, Low grade myelodysplastic syndrome lesions ([facility] OA (osteoarthritis), Pain of left lower extremity, and Rectal fissure. Past Surgical History:  has a past surgical history that includes Diagnostic Cardiac Cath Lab Procedure (01/11/89); Diagnostic Cardiac Cath Lab Procedure (01/16/89); Diagnostic Cardiac Cath Lab Procedure (09/22/11); Coronary angioplasty (01/16/89); Coronary angioplasty (04/12/89); Coronary angioplasty (09/22/11); Coronary angioplasty (10/19/11); NM Tumor Localization Spect Multi Day (08/15/11); Total knee arthroplasty (10/99); Colonoscopy; Endoscopy, colon, diagnostic; Coronary angioplasty with stent (12/3/15); joint replacement (1999); lumbar laminectomy (09/08/2017); Upper gastrointestinal endoscopy (09/26/2017); and Cholecystectomy, laparoscopic (04/10/2018). Social History:  reports that he has never smoked. He has never used smokeless tobacco. He reports current alcohol use. He reports that he does not use drugs. Family History: family history includes Diabetes in his brother; Heart Disease in his brother; Parkinsonism in his brother. The patients home medications have been reviewed.     Allergies: Iodine, Benadryl [diphenhydramine], and Ramipril    -------------------------------------------------- RESULTS -------------------------------------------------    LABS:  Results for orders placed or performed during the hospital encounter of 05/30/23   COVID-19, Rapid    Specimen: Nasopharyngeal Swab   Result Value Ref Range    SARS-CoV-2, NAAT DETECTED (A) Not Detected   Rapid influenza A/B antigens    Specimen: Nasopharyngeal   Result Value Ref Range    Influenza A by PCR Not Detected Not Detected    Influenza B by PCR Not Detected Not Detected   Strep Screen Group A Throat    Specimen: Throat   Result Value Ref

## 2023-05-30 NOTE — H&P
[place] [address] [facility]   History and Physical      CHIEF COMPLAINT: Cough, sore throat, diarrhea, syncope    History of Present Illness:  82 y.o. male with a history of coronary artery disease status post multiple angioplasties and stent placement, type 2 diabetes mellitus, GERD, osteoarthritis, chronic anemia, myelodysplastic syndrome, renal tubular acidosis and chronic kidney disease presenting with above complaints. Over the last few days he has had worsening fatigue along with above complaints. Diarrhea worsened today, and he had an episode of syncope with prodrome of lightheadedness and blurry vision. No provoking or palliating factors of above complaints. Denies any chest pain, shortness of breath, melena, hematochezia. He was seen in urgent care yesterday and tested negative for COVID-19. In the ED today, blood pressure borderline hypotensive but negative orthostatics. Hypoxic on room air. Rapid COVID-positive. Chemistries consistent with stage IIIa chronic kidney disease, CBC with leukocytosis which is consistent with past labs. Informant(s) for H&P: Patient, ED physician, chart review    REVIEW OF SYSTEMS:  no fevers, chills, cp, sob, n/v, ha, vision/hearing changes, wt changes, hot/cold flashes, other open skin lesions, diarrhea, constipation, dysuria/hematuria unless noted in HPI. Complete ROS performed with the patient and is otherwise negative.       PMH:  Past Medical History:   Diagnosis Date    Anal fissure 10/27/2016    Anemia     Arthritis     CAD (coronary artery disease)     Diabetes mellitus ([address]     ADULT ONSET    GERD (gastroesophageal reflux disease) 10/27/2016    Gout     History of blood transfusion     Hyperkalemia     Hyperlipidemia     Low back pain 07/21/2017    Low grade myelodysplastic syndrome lesions ([address] 06/13/2017    OA (osteoarthritis) 10/27/2016    Pain of left lower extremity 07/21/2017    Rectal fissure 1987       Surgical

## 2023-05-31 PROBLEM — J96.01 ACUTE RESPIRATORY FAILURE WITH HYPOXIA (HCC): Status: ACTIVE | Noted: 2023-05-31

## 2023-05-31 LAB
ACANTHOCYTES: ABNORMAL
ALBUMIN SERPL-MCNC: 3.7 G/DL (ref 3.5–5.2)
ALP SERPL-CCNC: 31 U/L (ref 40–129)
ALT SERPL-CCNC: 18 U/L (ref 0–40)
ANION GAP SERPL CALCULATED.3IONS-SCNC: 12 MMOL/L (ref 7–16)
ANISOCYTOSIS: ABNORMAL
AST SERPL-CCNC: 20 U/L (ref 0–39)
BASOPHILS # BLD: 0 E9/L (ref 0–0.2)
BASOPHILS NFR BLD: 0 % (ref 0–2)
BILIRUB SERPL-MCNC: 0.3 MG/DL (ref 0–1.2)
BUN SERPL-MCNC: 24 MG/DL (ref 6–23)
BURR CELLS: ABNORMAL
CALCIUM SERPL-MCNC: 8.5 MG/DL (ref 8.6–10.2)
CHLORIDE SERPL-SCNC: 104 MMOL/L (ref 98–107)
CO2 SERPL-SCNC: 20 MMOL/L (ref 22–29)
CREAT SERPL-MCNC: 1.3 MG/DL (ref 0.7–1.2)
EOSINOPHIL # BLD: 0 E9/L (ref 0.05–0.5)
EOSINOPHIL NFR BLD: 0 % (ref 0–6)
ERYTHROCYTE [DISTWIDTH] IN BLOOD BY AUTOMATED COUNT: 16.1 FL (ref 11.5–15)
GLUCOSE SERPL-MCNC: 232 MG/DL (ref 74–99)
HCT VFR BLD AUTO: 33.5 % (ref 37–54)
HGB BLD-MCNC: 10.6 G/DL (ref 12.5–16.5)
LYMPHOCYTES # BLD: 1.07 E9/L (ref 1.5–4)
LYMPHOCYTES NFR BLD: 3.5 % (ref 20–42)
MAGNESIUM SERPL-MCNC: 1.7 MG/DL (ref 1.6–2.6)
MCH RBC QN AUTO: 27.7 PG (ref 26–35)
MCHC RBC AUTO-ENTMCNC: 31.6 % (ref 32–34.5)
MCV RBC AUTO: 87.5 FL (ref 80–99.9)
METAMYELOCYTES NFR BLD MANUAL: 3.5 % (ref 0–1)
METER GLUCOSE: 160 MG/DL (ref 74–99)
METER GLUCOSE: 207 MG/DL (ref 74–99)
METER GLUCOSE: 246 MG/DL (ref 74–99)
METER GLUCOSE: 377 MG/DL (ref 74–99)
MONOCYTES # BLD: 4.81 E9/L (ref 0.1–0.95)
MONOCYTES NFR BLD: 18.3 % (ref 2–12)
MYELOCYTES NFR BLD MANUAL: 1.7 % (ref 0–0)
NEUTROPHILS # BLD: 20.83 E9/L (ref 1.8–7.3)
NEUTS SEG NFR BLD: 73 % (ref 43–80)
NRBC BLD-RTO: 0 /100 WBC
PHOSPHATE SERPL-MCNC: 4.5 MG/DL (ref 2.5–4.5)
PLATELET # BLD AUTO: 256 E9/L (ref 130–450)
PMV BLD AUTO: 13.5 FL (ref 7–12)
POIKILOCYTES: ABNORMAL
POLYCHROMASIA: ABNORMAL
POTASSIUM SERPL-SCNC: 4.2 MMOL/L (ref 3.5–5)
PROCALCITONIN: 0.16 NG/ML (ref 0–0.08)
PROT SERPL-MCNC: 7.7 G/DL (ref 6.4–8.3)
RBC # BLD AUTO: 3.83 E12/L (ref 3.8–5.8)
REASON FOR REJECTION: NORMAL
REJECTED TEST: NORMAL
SCHISTOCYTES: ABNORMAL
SODIUM SERPL-SCNC: 136 MMOL/L (ref 132–146)
WBC # BLD: 26.7 E9/L (ref 4.5–11.5)

## 2023-05-31 PROCEDURE — 2580000003 HC RX 258: Performed by: INTERNAL MEDICINE

## 2023-05-31 PROCEDURE — 6360000002 HC RX W HCPCS: Performed by: INTERNAL MEDICINE

## 2023-05-31 PROCEDURE — 83735 ASSAY OF MAGNESIUM: CPT

## 2023-05-31 PROCEDURE — 82962 GLUCOSE BLOOD TEST: CPT

## 2023-05-31 PROCEDURE — 94640 AIRWAY INHALATION TREATMENT: CPT

## 2023-05-31 PROCEDURE — 1200000000 HC SEMI PRIVATE

## 2023-05-31 PROCEDURE — 6370000000 HC RX 637 (ALT 250 FOR IP): Performed by: INTERNAL MEDICINE

## 2023-05-31 PROCEDURE — 84145 PROCALCITONIN (PCT): CPT

## 2023-05-31 PROCEDURE — 97165 OT EVAL LOW COMPLEX 30 MIN: CPT

## 2023-05-31 PROCEDURE — 85025 COMPLETE CBC W/AUTO DIFF WBC: CPT

## 2023-05-31 PROCEDURE — 99233 SBSQ HOSP IP/OBS HIGH 50: CPT | Performed by: INTERNAL MEDICINE

## 2023-05-31 PROCEDURE — 80053 COMPREHEN METABOLIC PANEL: CPT

## 2023-05-31 PROCEDURE — 84100 ASSAY OF PHOSPHORUS: CPT

## 2023-05-31 PROCEDURE — 36415 COLL VENOUS BLD VENIPUNCTURE: CPT

## 2023-05-31 RX ORDER — INSULIN LISPRO 100 [IU]/ML
0-16 INJECTION, SOLUTION INTRAVENOUS; SUBCUTANEOUS
Status: DISCONTINUED | OUTPATIENT
Start: 2023-05-31 | End: 2023-06-02 | Stop reason: HOSPADM

## 2023-05-31 RX ORDER — INSULIN LISPRO 100 [IU]/ML
0-4 INJECTION, SOLUTION INTRAVENOUS; SUBCUTANEOUS NIGHTLY
Status: DISCONTINUED | OUTPATIENT
Start: 2023-05-31 | End: 2023-06-02 | Stop reason: HOSPADM

## 2023-05-31 RX ADMIN — ALLOPURINOL 300 MG: 300 TABLET ORAL at 09:49

## 2023-05-31 RX ADMIN — INSULIN LISPRO 1 UNITS: 100 INJECTION, SOLUTION INTRAVENOUS; SUBCUTANEOUS at 06:08

## 2023-05-31 RX ADMIN — ALBUTEROL SULFATE 2.5 MG: 2.5 SOLUTION RESPIRATORY (INHALATION) at 09:04

## 2023-05-31 RX ADMIN — DESMOPRESSIN ACETATE 80 MG: 0.2 TABLET ORAL at 09:50

## 2023-05-31 RX ADMIN — ALBUTEROL SULFATE 2.5 MG: 2.5 SOLUTION RESPIRATORY (INHALATION) at 20:03

## 2023-05-31 RX ADMIN — PANTOPRAZOLE SODIUM 40 MG: 40 TABLET, DELAYED RELEASE ORAL at 06:04

## 2023-05-31 RX ADMIN — ASPIRIN 81 MG: 81 TABLET, COATED ORAL at 09:50

## 2023-05-31 RX ADMIN — ENOXAPARIN SODIUM 40 MG: 100 INJECTION SUBCUTANEOUS at 09:51

## 2023-05-31 RX ADMIN — SODIUM BICARBONATE 650 MG: 650 TABLET ORAL at 09:52

## 2023-05-31 RX ADMIN — BUDESONIDE 500 MCG: 0.5 SUSPENSION RESPIRATORY (INHALATION) at 20:03

## 2023-05-31 RX ADMIN — Medication 10 ML: at 09:52

## 2023-05-31 RX ADMIN — FOLIC ACID 1 MG: 1 TABLET ORAL at 09:51

## 2023-05-31 RX ADMIN — DEXAMETHASONE SODIUM PHOSPHATE 6 MG: 10 INJECTION INTRAMUSCULAR; INTRAVENOUS at 15:27

## 2023-05-31 RX ADMIN — INSULIN LISPRO 16 UNITS: 100 INJECTION, SOLUTION INTRAVENOUS; SUBCUTANEOUS at 11:30

## 2023-05-31 RX ADMIN — ALBUTEROL SULFATE 2.5 MG: 2.5 SOLUTION RESPIRATORY (INHALATION) at 12:27

## 2023-05-31 RX ADMIN — BARICITINIB 2 MG: 2 TABLET, FILM COATED ORAL at 09:50

## 2023-05-31 RX ADMIN — SODIUM BICARBONATE 650 MG: 650 TABLET ORAL at 21:57

## 2023-05-31 RX ADMIN — CYANOCOBALAMIN TAB 1000 MCG 1000 MCG: 1000 TAB at 09:52

## 2023-05-31 RX ADMIN — Medication 10 ML: at 21:57

## 2023-05-31 RX ADMIN — EZETIMIBE 10 MG: 10 TABLET ORAL at 09:51

## 2023-05-31 RX ADMIN — BUDESONIDE 500 MCG: 0.5 SUSPENSION RESPIRATORY (INHALATION) at 09:04

## 2023-05-31 ASSESSMENT — PAIN SCALES - GENERAL: PAINLEVEL_OUTOF10: 0

## 2023-05-31 NOTE — ACP (ADVANCE CARE PLANNING)
Advance Care Planning   Healthcare Decision Maker:    Primary Decision Maker: Renetta Bonds - [facility] - [phone]

## 2023-05-31 NOTE — CARE COORDINATION
Covid +. CM called into pt.'s room and spoke w/spouse to complete assessment. Spouse states the two reside together in a two story home, two steps to enter and pt is independent w/the occasional use of a cane or walker. Pt is established w/Dr. Xin Reveles and uses [facility] in [place]. No hx of HHC, but SNF stay at [address] [place] [address] following back surgery. Pt [facility] No home O2, cpap or nebulizer. Pt is currently on 2l, goal is to wean. If unable to wean O2, pt has no preference of O2 supplier w/referral made to [facility]. Will need ambulatory pulse ox testing prior to discharge. Pt will return home upon discharge w/spouse to transport and will resume outpt therapy. CM instructed spouse to reach out if [address] is needed. PT/OT to evaluate. Will follow. Case Management Assessment  Initial Evaluation    Date/Time of Evaluation: 5/31/2023 11:51 AM  Assessment Completed by: Rui Becerra RN    If patient is discharged prior to next notation, then this note serves as note for discharge by case management. Patient Name: Stella Salomon                   YOB: 1935  Diagnosis: Syncope and collapse [R55]  Acute renal insufficiency [N28.9]  Acute respiratory failure with hypoxia ([facility] [J96.01]  Acute hypoxemic respiratory failure due to COVID-19 ([facility] [U07.1, J96.01]  COVID-19 virus infection [U07.1]                   Date / Time: 5/30/2023 11:26 AM    Patient Admission Status: Inpatient   Readmission Risk (Low < 19, Mod (19-27), High > 27): Readmission Risk Score: 16.6    Current PCP: Brandon Teixeira, DO  PCP verified by CM? Yes    Chart Reviewed: Yes      History Provided by: Spouse  Patient Orientation: Alert and Oriented    Patient Cognition: Alert    Hospitalization in the last 30 days (Readmission):  No    If yes, Readmission Assessment in CM Navigator will be completed.     Advance Directives:      Code Status: Full Code   Patient's Primary Decision Maker is: Legal Next of

## 2023-05-31 NOTE — ACP (ADVANCE CARE PLANNING)
Advance Care Planning     Advance Care Planning Activator (Inpatient)  Conversation Note      Date of ACP Conversation: 5/31/2023     Conversation Conducted with: Patient with Decision Making Capacity    ACP Activator: Andrei Joaquin RN      Health Care Decision Maker:     Current Designated Health Care Decision Maker:     Primary Decision Maker: Hilary Unger - Spouse - [phone]    Care Preferences    Ventilation: \"If you were in your present state of health and suddenly became very ill and were unable to breathe on your own, what would your preference be about the use of a ventilator (breathing machine) if it were available to you? \"      Would the patient desire the use of ventilator (breathing machine)?: no    \"If your health worsens and it becomes clear that your chance of recovery is unlikely, what would your preference be about the use of a ventilator (breathing machine) if it were available to you? \"     Would the patient desire the use of ventilator (breathing machine)?: No      Resuscitation  \"CPR works best to restart the heart when there is a sudden event, like a heart attack, in someone who is otherwise healthy. Unfortunately, CPR does not typically restart the heart for people who have serious health conditions or who are very sick. \"    \"In the event your heart stopped as a result of an underlying serious health condition, would you want attempts to be made to restart your heart (answer \"yes\" for attempt to resuscitate) or would you prefer a natural death (answer \"no\" for do not attempt to resuscitate)? \" yes       [] Yes   [] No   Educated Patient / Asad Estevez regarding differences between Advance Directives and portable DNR orders.     Length of ACP Conversation in minutes:      Conversation Outcomes:  ACP discussion completed    Follow-up plan:    [] Schedule follow-up conversation to continue planning  [] Referred individual to Provider for additional questions/concerns   [] Advised

## 2023-06-01 PROBLEM — D72.829 LEUKOCYTOSIS: Status: ACTIVE | Noted: 2023-06-01

## 2023-06-01 PROBLEM — E87.20 ACIDOSIS: Status: ACTIVE | Noted: 2023-06-01

## 2023-06-01 PROBLEM — E11.65 UNCONTROLLED TYPE 2 DIABETES MELLITUS WITH HYPERGLYCEMIA (HCC): Status: ACTIVE | Noted: 2023-06-01

## 2023-06-01 PROBLEM — U07.1 COVID-19 VIRUS INFECTION: Status: ACTIVE | Noted: 2023-06-01

## 2023-06-01 LAB
ALBUMIN SERPL-MCNC: 4.4 G/DL (ref 3.5–5.2)
ALP SERPL-CCNC: 34 U/L (ref 40–129)
ALT SERPL-CCNC: 23 U/L (ref 0–40)
ANION GAP SERPL CALCULATED.3IONS-SCNC: 14 MMOL/L (ref 7–16)
ANISOCYTOSIS: ABNORMAL
AST SERPL-CCNC: 26 U/L (ref 0–39)
BASOPHILS # BLD: 0 E9/L (ref 0–0.2)
BASOPHILS NFR BLD: 0 % (ref 0–2)
BILIRUB SERPL-MCNC: 0.3 MG/DL (ref 0–1.2)
BUN SERPL-MCNC: 26 MG/DL (ref 6–23)
BURR CELLS: ABNORMAL
CALCIUM SERPL-MCNC: 9.1 MG/DL (ref 8.6–10.2)
CHLORIDE SERPL-SCNC: 98 MMOL/L (ref 98–107)
CO2 SERPL-SCNC: 24 MMOL/L (ref 22–29)
CREAT SERPL-MCNC: 1 MG/DL (ref 0.7–1.2)
EOSINOPHIL # BLD: 0 E9/L (ref 0.05–0.5)
EOSINOPHIL NFR BLD: 0 % (ref 0–6)
ERYTHROCYTE [DISTWIDTH] IN BLOOD BY AUTOMATED COUNT: 16.3 FL (ref 11.5–15)
GLUCOSE SERPL-MCNC: 197 MG/DL (ref 74–99)
HCT VFR BLD AUTO: 35.5 % (ref 37–54)
HGB BLD-MCNC: 11.1 G/DL (ref 12.5–16.5)
LYMPHOCYTES # BLD: 1.63 E9/L (ref 1.5–4)
LYMPHOCYTES NFR BLD: 4.3 % (ref 20–42)
MAGNESIUM SERPL-MCNC: 1.7 MG/DL (ref 1.6–2.6)
MCH RBC QN AUTO: 27.8 PG (ref 26–35)
MCHC RBC AUTO-ENTMCNC: 31.3 % (ref 32–34.5)
MCV RBC AUTO: 89 FL (ref 80–99.9)
METER GLUCOSE: 189 MG/DL (ref 74–99)
METER GLUCOSE: 203 MG/DL (ref 74–99)
METER GLUCOSE: 209 MG/DL (ref 74–99)
METER GLUCOSE: 252 MG/DL (ref 74–99)
MONOCYTES # BLD: 6.94 E9/L (ref 0.1–0.95)
MONOCYTES NFR BLD: 17.4 % (ref 2–12)
MYELOCYTES NFR BLD MANUAL: 0.9 % (ref 0–0)
NEUTROPHILS # BLD: 31.82 E9/L (ref 1.8–7.3)
NEUTS SEG NFR BLD: 77.4 % (ref 43–80)
NRBC BLD-RTO: 0 /100 WBC
PHOSPHATE SERPL-MCNC: 3.1 MG/DL (ref 2.5–4.5)
PLATELET # BLD AUTO: 347 E9/L (ref 130–450)
PMV BLD AUTO: 13.3 FL (ref 7–12)
POIKILOCYTES: ABNORMAL
POLYCHROMASIA: ABNORMAL
POTASSIUM SERPL-SCNC: 3.8 MMOL/L (ref 3.5–5)
PROT SERPL-MCNC: 8.4 G/DL (ref 6.4–8.3)
RBC # BLD AUTO: 3.99 E12/L (ref 3.8–5.8)
SODIUM SERPL-SCNC: 136 MMOL/L (ref 132–146)
WBC # BLD: 40.8 E9/L (ref 4.5–11.5)

## 2023-06-01 PROCEDURE — 85025 COMPLETE CBC W/AUTO DIFF WBC: CPT

## 2023-06-01 PROCEDURE — 6360000002 HC RX W HCPCS: Performed by: INTERNAL MEDICINE

## 2023-06-01 PROCEDURE — 94640 AIRWAY INHALATION TREATMENT: CPT

## 2023-06-01 PROCEDURE — 2700000000 HC OXYGEN THERAPY PER DAY

## 2023-06-01 PROCEDURE — 99232 SBSQ HOSP IP/OBS MODERATE 35: CPT | Performed by: INTERNAL MEDICINE

## 2023-06-01 PROCEDURE — 97161 PT EVAL LOW COMPLEX 20 MIN: CPT

## 2023-06-01 PROCEDURE — 2580000003 HC RX 258: Performed by: EMERGENCY MEDICINE

## 2023-06-01 PROCEDURE — 82962 GLUCOSE BLOOD TEST: CPT

## 2023-06-01 PROCEDURE — 6370000000 HC RX 637 (ALT 250 FOR IP): Performed by: INTERNAL MEDICINE

## 2023-06-01 PROCEDURE — 84100 ASSAY OF PHOSPHORUS: CPT

## 2023-06-01 PROCEDURE — 2580000003 HC RX 258: Performed by: INTERNAL MEDICINE

## 2023-06-01 PROCEDURE — 83735 ASSAY OF MAGNESIUM: CPT

## 2023-06-01 PROCEDURE — 80053 COMPREHEN METABOLIC PANEL: CPT

## 2023-06-01 PROCEDURE — 1200000000 HC SEMI PRIVATE

## 2023-06-01 PROCEDURE — 36415 COLL VENOUS BLD VENIPUNCTURE: CPT

## 2023-06-01 PROCEDURE — 99231 SBSQ HOSP IP/OBS SF/LOW 25: CPT | Performed by: NURSE PRACTITIONER

## 2023-06-01 RX ADMIN — SODIUM BICARBONATE 650 MG: 650 TABLET ORAL at 09:37

## 2023-06-01 RX ADMIN — INSULIN LISPRO 4 UNITS: 100 INJECTION, SOLUTION INTRAVENOUS; SUBCUTANEOUS at 06:43

## 2023-06-01 RX ADMIN — PANTOPRAZOLE SODIUM 40 MG: 40 TABLET, DELAYED RELEASE ORAL at 06:42

## 2023-06-01 RX ADMIN — ASPIRIN 81 MG: 81 TABLET, COATED ORAL at 09:37

## 2023-06-01 RX ADMIN — SODIUM BICARBONATE 650 MG: 650 TABLET ORAL at 20:33

## 2023-06-01 RX ADMIN — FOLIC ACID 1 MG: 1 TABLET ORAL at 09:37

## 2023-06-01 RX ADMIN — Medication 10 ML: at 20:33

## 2023-06-01 RX ADMIN — BUDESONIDE 500 MCG: 0.5 SUSPENSION RESPIRATORY (INHALATION) at 08:17

## 2023-06-01 RX ADMIN — ALBUTEROL SULFATE 2.5 MG: 2.5 SOLUTION RESPIRATORY (INHALATION) at 16:33

## 2023-06-01 RX ADMIN — DOCUSATE SODIUM 100 MG: 100 CAPSULE, LIQUID FILLED ORAL at 14:54

## 2023-06-01 RX ADMIN — Medication 10 ML: at 09:38

## 2023-06-01 RX ADMIN — CARVEDILOL 3.12 MG: 3.12 TABLET, FILM COATED ORAL at 20:33

## 2023-06-01 RX ADMIN — ALBUTEROL SULFATE 2.5 MG: 2.5 SOLUTION RESPIRATORY (INHALATION) at 12:38

## 2023-06-01 RX ADMIN — ALBUTEROL SULFATE 2.5 MG: 2.5 SOLUTION RESPIRATORY (INHALATION) at 08:16

## 2023-06-01 RX ADMIN — CYANOCOBALAMIN TAB 1000 MCG 1000 MCG: 1000 TAB at 09:37

## 2023-06-01 RX ADMIN — INSULIN LISPRO 4 UNITS: 100 INJECTION, SOLUTION INTRAVENOUS; SUBCUTANEOUS at 16:22

## 2023-06-01 RX ADMIN — EZETIMIBE 10 MG: 10 TABLET ORAL at 09:37

## 2023-06-01 RX ADMIN — DEXAMETHASONE SODIUM PHOSPHATE 6 MG: 10 INJECTION INTRAMUSCULAR; INTRAVENOUS at 14:54

## 2023-06-01 RX ADMIN — POLYETHYLENE GLYCOL 3350 17 G: 17 POWDER, FOR SOLUTION ORAL at 14:54

## 2023-06-01 RX ADMIN — ALLOPURINOL 300 MG: 300 TABLET ORAL at 09:37

## 2023-06-01 RX ADMIN — BUDESONIDE 500 MCG: 0.5 SUSPENSION RESPIRATORY (INHALATION) at 21:21

## 2023-06-01 RX ADMIN — BARICITINIB 2 MG: 2 TABLET, FILM COATED ORAL at 09:37

## 2023-06-01 RX ADMIN — SODIUM CHLORIDE, PRESERVATIVE FREE 10 ML: 5 INJECTION INTRAVENOUS at 14:56

## 2023-06-01 RX ADMIN — ALBUTEROL SULFATE 2.5 MG: 2.5 SOLUTION RESPIRATORY (INHALATION) at 21:21

## 2023-06-01 RX ADMIN — ENOXAPARIN SODIUM 40 MG: 100 INJECTION SUBCUTANEOUS at 09:37

## 2023-06-01 RX ADMIN — DESMOPRESSIN ACETATE 80 MG: 0.2 TABLET ORAL at 09:37

## 2023-06-01 ASSESSMENT — PAIN SCALES - GENERAL: PAINLEVEL_OUTOF10: 0

## 2023-06-01 NOTE — CARE COORDINATION
+ covid; pox testing noted; still needs 02 2lnc(new). [facility] DME following. Would need orders/pox testing again prior to discharge. Lives with family. No other needs identified. Declines HHC at present. Still awaitjng PT eval to be done. Will follow. Pt [facility]. Will follow. Monae Powell.

## 2023-06-02 VITALS
TEMPERATURE: 97.5 F | WEIGHT: 158.5 LBS | SYSTOLIC BLOOD PRESSURE: 167 MMHG | OXYGEN SATURATION: 95 % | HEART RATE: 50 BPM | BODY MASS INDEX: 25.47 KG/M2 | HEIGHT: 66 IN | RESPIRATION RATE: 18 BRPM | DIASTOLIC BLOOD PRESSURE: 72 MMHG

## 2023-06-02 LAB
ALBUMIN SERPL-MCNC: 4 G/DL (ref 3.5–5.2)
ALP SERPL-CCNC: 31 U/L (ref 40–129)
ALT SERPL-CCNC: 29 U/L (ref 0–40)
ANION GAP SERPL CALCULATED.3IONS-SCNC: 14 MMOL/L (ref 7–16)
AST SERPL-CCNC: 29 U/L (ref 0–39)
BILIRUB SERPL-MCNC: 0.3 MG/DL (ref 0–1.2)
BUN SERPL-MCNC: 25 MG/DL (ref 6–23)
CALCIUM SERPL-MCNC: 8.7 MG/DL (ref 8.6–10.2)
CHLORIDE SERPL-SCNC: 100 MMOL/L (ref 98–107)
CO2 SERPL-SCNC: 21 MMOL/L (ref 22–29)
CREAT SERPL-MCNC: 1 MG/DL (ref 0.7–1.2)
ERYTHROCYTE [DISTWIDTH] IN BLOOD BY AUTOMATED COUNT: 16.1 FL (ref 11.5–15)
GLUCOSE SERPL-MCNC: 256 MG/DL (ref 74–99)
HCT VFR BLD AUTO: 34.8 % (ref 37–54)
HGB BLD-MCNC: 10.8 G/DL (ref 12.5–16.5)
MCH RBC QN AUTO: 27.4 PG (ref 26–35)
MCHC RBC AUTO-ENTMCNC: 31 % (ref 32–34.5)
MCV RBC AUTO: 88.3 FL (ref 80–99.9)
METER GLUCOSE: 195 MG/DL (ref 74–99)
METER GLUCOSE: 199 MG/DL (ref 74–99)
PLATELET # BLD AUTO: 344 E9/L (ref 130–450)
PMV BLD AUTO: 13.5 FL (ref 7–12)
POTASSIUM SERPL-SCNC: 3.9 MMOL/L (ref 3.5–5)
PROT SERPL-MCNC: 7.8 G/DL (ref 6.4–8.3)
RBC # BLD AUTO: 3.94 E12/L (ref 3.8–5.8)
SODIUM SERPL-SCNC: 135 MMOL/L (ref 132–146)
WBC # BLD: 25.5 E9/L (ref 4.5–11.5)

## 2023-06-02 PROCEDURE — 2580000003 HC RX 258: Performed by: INTERNAL MEDICINE

## 2023-06-02 PROCEDURE — 36415 COLL VENOUS BLD VENIPUNCTURE: CPT

## 2023-06-02 PROCEDURE — 82962 GLUCOSE BLOOD TEST: CPT

## 2023-06-02 PROCEDURE — 99239 HOSP IP/OBS DSCHRG MGMT >30: CPT | Performed by: INTERNAL MEDICINE

## 2023-06-02 PROCEDURE — 6360000002 HC RX W HCPCS: Performed by: INTERNAL MEDICINE

## 2023-06-02 PROCEDURE — 85027 COMPLETE CBC AUTOMATED: CPT

## 2023-06-02 PROCEDURE — 80053 COMPREHEN METABOLIC PANEL: CPT

## 2023-06-02 PROCEDURE — 99239 HOSP IP/OBS DSCHRG MGMT >30: CPT | Performed by: NURSE PRACTITIONER

## 2023-06-02 PROCEDURE — 6360000002 HC RX W HCPCS: Performed by: NURSE PRACTITIONER

## 2023-06-02 PROCEDURE — 6370000000 HC RX 637 (ALT 250 FOR IP): Performed by: INTERNAL MEDICINE

## 2023-06-02 PROCEDURE — 94640 AIRWAY INHALATION TREATMENT: CPT

## 2023-06-02 RX ORDER — MAGNESIUM SULFATE IN WATER 40 MG/ML
2000 INJECTION, SOLUTION INTRAVENOUS ONCE
Status: COMPLETED | OUTPATIENT
Start: 2023-06-02 | End: 2023-06-02

## 2023-06-02 RX ORDER — DEXAMETHASONE 6 MG/1
6 TABLET ORAL
Qty: 5 TABLET | Refills: 0 | Status: SHIPPED | OUTPATIENT
Start: 2023-06-02 | End: 2023-06-07

## 2023-06-02 RX ADMIN — ISOSORBIDE MONONITRATE 30 MG: 30 TABLET, EXTENDED RELEASE ORAL at 08:46

## 2023-06-02 RX ADMIN — DOCUSATE SODIUM 100 MG: 100 CAPSULE, LIQUID FILLED ORAL at 10:10

## 2023-06-02 RX ADMIN — PANTOPRAZOLE SODIUM 40 MG: 40 TABLET, DELAYED RELEASE ORAL at 06:19

## 2023-06-02 RX ADMIN — DESMOPRESSIN ACETATE 80 MG: 0.2 TABLET ORAL at 08:47

## 2023-06-02 RX ADMIN — POLYETHYLENE GLYCOL 3350 17 G: 17 POWDER, FOR SOLUTION ORAL at 10:10

## 2023-06-02 RX ADMIN — ALBUTEROL SULFATE 2.5 MG: 2.5 SOLUTION RESPIRATORY (INHALATION) at 09:30

## 2023-06-02 RX ADMIN — ALBUTEROL SULFATE 2.5 MG: 2.5 SOLUTION RESPIRATORY (INHALATION) at 14:08

## 2023-06-02 RX ADMIN — EZETIMIBE 10 MG: 10 TABLET ORAL at 08:46

## 2023-06-02 RX ADMIN — CYANOCOBALAMIN TAB 1000 MCG 1000 MCG: 1000 TAB at 08:47

## 2023-06-02 RX ADMIN — BUDESONIDE 500 MCG: 0.5 SUSPENSION RESPIRATORY (INHALATION) at 09:30

## 2023-06-02 RX ADMIN — BARICITINIB 2 MG: 2 TABLET, FILM COATED ORAL at 08:46

## 2023-06-02 RX ADMIN — CARVEDILOL 3.12 MG: 3.12 TABLET, FILM COATED ORAL at 08:46

## 2023-06-02 RX ADMIN — FOLIC ACID 1 MG: 1 TABLET ORAL at 08:46

## 2023-06-02 RX ADMIN — SODIUM BICARBONATE 650 MG: 650 TABLET ORAL at 08:46

## 2023-06-02 RX ADMIN — ASPIRIN 81 MG: 81 TABLET, COATED ORAL at 08:46

## 2023-06-02 RX ADMIN — ENOXAPARIN SODIUM 40 MG: 100 INJECTION SUBCUTANEOUS at 08:48

## 2023-06-02 RX ADMIN — Medication 10 ML: at 08:49

## 2023-06-02 RX ADMIN — ALLOPURINOL 300 MG: 300 TABLET ORAL at 08:46

## 2023-06-02 RX ADMIN — MAGNESIUM SULFATE HEPTAHYDRATE 2000 MG: 40 INJECTION, SOLUTION INTRAVENOUS at 08:43

## 2023-06-02 ASSESSMENT — PAIN SCALES - GENERAL: PAINLEVEL_OUTOF10: 0

## 2023-06-02 NOTE — CARE COORDINATION
Iv decadron/ baricinitib. Pox 95% r/a ambulating; [facility] 22/24. [facility] DME was following for poss 02 needs. Plan is home, no needs. Family can transport. Governor Shahzad.

## 2023-06-02 NOTE — DISCHARGE SUMMARY
[facility] Physician Discharge Summary       Toma Quintana [place] [id number]    Follow up  follow up, As needed      Activity level: As tolerated     Dispo:Home      Condition on discharge: Stable     Patient ID:  Tracy Aldana  [id number]  82 y.o.  1935    Admit date: 5/30/2023    Discharge date and time:  6/2/2023  3:18 PM    Admission Diagnoses: Principal Problem:    Acute hypoxemic respiratory failure [facility] [place] [address])  Active Problems:    Stage 3a chronic kidney disease (CKD) ([facility]    Coronary artery disease involving native coronary artery of native heart without angina pectoris    Anemia of chronic disease    GERD (gastroesophageal reflux disease)    Low grade myelodysplastic syndrome lesions (HCC)    Hypotension due to hypovolemia    Type 2 diabetes mellitus with diabetic peripheral angiopathy without gangrene, without long-term current use of insulin (HCC)    Hyperlipidemia, mixed    Syncope and collapse    Renal tubular acidosis    Acute respiratory failure with hypoxia (HCC)    Leukocytosis    Acidosis    COVID-19 virus infection    Uncontrolled type 2 diabetes mellitus with hyperglycemia ([facility]  Resolved Problems:    * No resolved hospital problems.  *      Discharge Diagnoses: Principal Problem:    Acute hypoxemic respiratory failure due [facility] [place] [address])  Active Problems:    Stage 3a chronic kidney disease (CKD) (HCC)    Coronary artery disease involving native coronary artery of native heart without angina pectoris    Anemia of chronic disease    GERD (gastroesophageal reflux disease)    Low grade myelodysplastic syndrome lesions (HCC)    Hypotension due to hypovolemia    Type 2 diabetes mellitus with diabetic peripheral angiopathy without gangrene, without long-term current use of insulin (HCC)    Hyperlipidemia, mixed    Syncope and collapse    Renal tubular acidosis    Acute respiratory failure with hypoxia (HCC)    Leukocytosis

## 2023-06-02 NOTE — PROGRESS NOTES
-Consulted for COVID therapeutics. -D-dimer is elevated. -Patient is on supplemental oxygen, steroids, and anticoagulation. -eGFR 30-60.  -Will place order for baricitinib 2 mg PO once daily for 14 days or until hospital discharge.
CLINICAL PHARMACY NOTE: MEDS TO BEDS    Total # of Prescriptions Filled: 1   The following medications were delivered to the patient:  Dexamethasone 6 mg    Additional Documentation:  Picked up
Database initiated. Patient is A&O from home with wife. States he uses a walker sometimes and is RA at baseline. He has been having diarrhea.
[facility] ER Progress Note    Assessment / Plan  Acute hypoxemic respiratory failure due to COVID-19   -Continue IV Decadron, anticipate switch to PO tomorrow  -Continue baricitinib 2 mg p.o. daily  -Scheduled ICS and albuterol nebulizer  -Wean nasal cannula oxygen as tolerated while treating as noted above     Hypotension due to hypovolemia, syncope, resolved     Coronary artery disease, hyperlipidemia  -No anginal symptoms at this time  -Hold carvedilol and Imdur today, can restart tomorrow if BP remains ok  -Continue aspirin, atorvastatin, Zetia     Type 2 diabetes mellitus  -Hold home oral medications for now  -Sliding scale switched to high due to steroid-induced hyperglycemia     Stage IIIa chronic kidney disease and renal tubular acidosis  -Continue oral bicarbonate supplementation     Anemia of chronic disease  -Monitor     GERD  -continue PPI     Low-grade myelodysplastic syndrome lesions  -monitor CBC    Anticipated disposition  To be determined  Anticipated length of stay  To be determined   DVT prophylaxis     Lovenox  --------------------------------------------------------------------------------------    Admission Date  5/30/2023 11:26 AM  Chief Complaint Emesis, diarrhea, fatigue    Subjective  Seen at bedside, says feeling much better. No real SOB at rest, still some Madrigal but much improved. No fevers, chills, CP, n/v/d. Nonproductive cough is improving. No new issues today.       Review of Systems - 12-point review of systems has been reviewed and is otherwise negative except as listed in the HPI    Objective  Physical Exam  Vitals: BP (!) 147/70   Pulse 60   Temp 97.5 °F (36.4 °C) (Oral)   Resp 18   Ht 5' 6\" (1.676 m)   Wt 158 lb 5 oz (71.8 kg)   SpO2 95%   BMI 25.55 kg/m²   General: well-developed, well-nourished, no acute distress, cooperative  Skin: generally warm, dry, and intact, with normal color  HEENT: normocephalic, atraumatic, no gross abnormalities,
Occupational Therapy    OCCUPATIONAL THERAPY INITIAL EVALUATION     [address] [facility] [place], [address]                                                  Patient Name: Sahra Dennison    MRN: [id number]    : 1935    Room: [address] [place] [address]      Evaluating OT: Amanda David OTR/L   [id number]      Referring Provider:Willam Ospina DO    Specific Provider Orders/Date:OT eval and treat 2023      Diagnosis:  Syncope and collapse [R55]  Acute renal insufficiency [N28.9]  Acute respiratory failure with hypoxia ([address] [J96.01]  Acute hypoxemic respiratory failure due to COVID-19 ([address] [U07.1, J96.01]  COVID-19 virus infection [U07.1]     Pertinent Medical History: gout, low back pain , back surgery       Precautions:  Fall Risk, DROPLET PLUS, O2      Assessment of current deficits    [x] Functional mobility  [x]ADLs  [x] Strength               []Cognition    [x] Functional transfers   [x] IADLs         [x] Safety Awareness   [x]Endurance    [] Fine Coordination              [x] Balance      [] Vision/perception   []Sensation     []Gross Motor Coordination  [] ROM  [] Delirium                   [] Motor Control     OT PLAN OF CARE   OT POC based on physician orders, patient diagnosis and results of clinical assessment    Frequency/Duration  1-3 days/wk for 2 weeks PRN   Specific OT Treatment Interventions to include:   ADL retraining/adapted techniques and AE recommendations to increase functional independence within precautions                    Energy conservation techniques to improve tolerance for selfcare routine   Functional transfer/mobility training/DME recommendations for increased independence, safety and fall prevention         Patient/family education to increase safety and functional independence             Environmental modifications for safe mobility and completion of ADLs                             Therapeutic
Physical Therapy  Facility/Department: [address] MED SURG/TELE  Physical Therapy Initial Assessment    Name: Marie Gomez  : 1935  MRN: [id number]  Date of Service: 2023    Attending Provider:  Corwin Marquez DO    Evaluating PT:  Alem Albarado. Agnieszka Dyson P.T. Room #:  [facility]/6258-W  Diagnosis:  Syncope and collapse [R55]  Acute renal insufficiency [N28.9]  Acute respiratory failure with hypoxia (HCC) [J96.01]  Acute hypoxemic respiratory failure due to COVID-19 ([facility] [U07.1, J96.01]  COVID-19 virus infection [U07.1]  Precautions:  falls, droplet + isolation    SUBJECTIVE:    Pt lives with his wife in a 2 story home with 1+1 or 2 stairs and no rail to enter. There are 12-14 steps and 1 rail to 2nd floor bed and bath. Pt ambulated with no AD, but used a cane or ww PRN. OBJECTIVE:   Initial Evaluation  Date: 23 Treatment Short Term/ Long Term   Goals   Was pt agreeable to Eval/treatment? yes     Does pt have pain? No c/o pain     Bed Mobility  Rolling: Independent  Supine to sit: Independent  Sit to supine: Independent  Scooting: Independent  Independent   Transfers Sit to stand: Independent  Stand to sit: Independent  Stand pivot: Independent with ww  Independent    Ambulation   120 feet with ww supervision  200 feet with ww if needed Independent    Stair negotiation: ascended and descended NA  If out of isolation: 12 steps with 1 rail supervision   AM-PAC 6 Clicks 92/75       BLE ROM is WFL. BLE strength is grossly 4/5 to 4+/5. Balance: sitting is Independent and standing with ww is Independent  Endurance: fair+    Vitals:  Pt was on RA throughout PT session. O2 sat at rest was 93% and after amb pt had no SOB and was able to hold a conversation comfortably while walking and O2 sat was 91-95%.      ASSESSMENT:    Conditions Requiring Skilled Therapeutic Intervention:    [x]Decreased strength     []Decreased ROM  [x]Decreased functional mobility  [x]Decreased balance   [x]Decreased endurance
Pox 95 % room air ambulating.
Pt requested for colace and glycolol since no bm still after receiving colace and glycolol yesterday.
Pt states he has not had a bowel movement since 5/30, requested meds for constipation. Colace and glycolol given.
Pulse ox was 96 on room air at rest.  O2 saturation 84% on room air while ambulating. Recovery pulse ox was 96 % without o2 applied. Pt denies feeling sob while ambulating. Results were same x2.
Verbal and written discharge instructions given, voiced understanding. Pt discharged with wife via wheelchair to car.
profile, past medical, family and social history when applicable), examination, review of lab and test data, diagnostic impressions and medical decision making - performed by VIKTORIYA Hall CNP, were discussed with me on the date of service and I agree with clinical information herein unless otherwise noted. The patient has been evaluated by me personally earlier today. Pt reports no fevers, chills,n/v.     Exam: heart reg at rate of 54, lungs cta, abd pos bs soft nt, ext neg for le edema    I agree with the assessment and plan of VIKTORIYA Hall CNP    27 min spent myself reviewing records, interviewing/examining pt, analyzing data, discussing with nursing, formulating treatment plan as noted in NP's note. Acute respiratory failure with hypoxia([id number]%)POA pt dropping to 84% and symptomatic with walking today)  Covid 19 infection  Dehydration/hypovolemia  gerd  hyperlipidemia  Acidosis  Dm type 2 uncontrolled  Leukocytosis  alen        Electronically signed by Josefa Vega D.O.   Hospitalist  [facility] Hospitalist Service at [facility]

## 2023-06-05 ENCOUNTER — CLINICAL DOCUMENTATION ONLY (OUTPATIENT)
Facility: CLINIC | Age: 88
End: 2023-06-05

## 2023-06-27 ENCOUNTER — OFFICE VISIT (OUTPATIENT)
Dept: FAMILY MEDICINE CLINIC | Age: 88
End: 2023-06-27
Payer: MEDICARE

## 2023-06-27 VITALS
TEMPERATURE: 97.3 F | DIASTOLIC BLOOD PRESSURE: 74 MMHG | HEART RATE: 88 BPM | OXYGEN SATURATION: 98 % | SYSTOLIC BLOOD PRESSURE: 122 MMHG | WEIGHT: 159 LBS | BODY MASS INDEX: 25.66 KG/M2

## 2023-06-27 DIAGNOSIS — R05.3 POST-COVID-19 SYNDROME MANIFESTING AS CHRONIC COUGH: Primary | ICD-10-CM

## 2023-06-27 DIAGNOSIS — R05.1 ACUTE COUGH: ICD-10-CM

## 2023-06-27 DIAGNOSIS — U09.9 POST-COVID-19 SYNDROME MANIFESTING AS CHRONIC COUGH: Primary | ICD-10-CM

## 2023-06-27 PROCEDURE — 1111F DSCHRG MED/CURRENT MED MERGE: CPT

## 2023-06-27 PROCEDURE — 1036F TOBACCO NON-USER: CPT

## 2023-06-27 PROCEDURE — G8417 CALC BMI ABV UP PARAM F/U: HCPCS

## 2023-06-27 PROCEDURE — 99213 OFFICE O/P EST LOW 20 MIN: CPT

## 2023-06-27 PROCEDURE — G8427 DOCREV CUR MEDS BY ELIG CLIN: HCPCS

## 2023-06-27 PROCEDURE — 1123F ACP DISCUSS/DSCN MKR DOCD: CPT

## 2023-06-27 RX ORDER — DEXTROMETHORPHAN POLISTIREX 30 MG/5ML
60 SUSPENSION ORAL 2 TIMES DAILY PRN
Qty: 148 ML | Refills: 0 | Status: SHIPPED | OUTPATIENT
Start: 2023-06-27 | End: 2023-07-04

## 2023-07-07 ENCOUNTER — OFFICE VISIT (OUTPATIENT)
Dept: PRIMARY CARE CLINIC | Age: 88
End: 2023-07-07

## 2023-07-07 VITALS
DIASTOLIC BLOOD PRESSURE: 62 MMHG | WEIGHT: 156 LBS | BODY MASS INDEX: 25.07 KG/M2 | SYSTOLIC BLOOD PRESSURE: 110 MMHG | TEMPERATURE: 97.2 F | HEART RATE: 83 BPM | HEIGHT: 66 IN | RESPIRATION RATE: 16 BRPM | OXYGEN SATURATION: 96 %

## 2023-07-07 DIAGNOSIS — J98.01 POST-INFECTION BRONCHOSPASM: Primary | ICD-10-CM

## 2023-07-07 DIAGNOSIS — E11.51 TYPE II DIABETES MELLITUS WITH PERIPHERAL CIRCULATORY DISORDER (HCC): ICD-10-CM

## 2023-07-07 RX ORDER — BROMPHENIRAMINE MALEATE, PSEUDOEPHEDRINE HYDROCHLORIDE, AND DEXTROMETHORPHAN HYDROBROMIDE 2; 30; 10 MG/5ML; MG/5ML; MG/5ML
5 SYRUP ORAL 4 TIMES DAILY PRN
Qty: 250 ML | Refills: 0 | Status: SHIPPED | OUTPATIENT
Start: 2023-07-07

## 2023-07-07 RX ORDER — ALBUTEROL SULFATE 90 UG/1
2 AEROSOL, METERED RESPIRATORY (INHALATION) 4 TIMES DAILY PRN
Qty: 18 G | Refills: 0 | Status: SHIPPED | OUTPATIENT
Start: 2023-07-07

## 2023-07-07 SDOH — ECONOMIC STABILITY: FOOD INSECURITY: WITHIN THE PAST 12 MONTHS, THE FOOD YOU BOUGHT JUST DIDN'T LAST AND YOU DIDN'T HAVE MONEY TO GET MORE.: NEVER TRUE

## 2023-07-07 SDOH — ECONOMIC STABILITY: FOOD INSECURITY: WITHIN THE PAST 12 MONTHS, YOU WORRIED THAT YOUR FOOD WOULD RUN OUT BEFORE YOU GOT MONEY TO BUY MORE.: NEVER TRUE

## 2023-07-07 SDOH — ECONOMIC STABILITY: HOUSING INSECURITY
IN THE LAST 12 MONTHS, WAS THERE A TIME WHEN YOU DID NOT HAVE A STEADY PLACE TO SLEEP OR SLEPT IN A SHELTER (INCLUDING NOW)?: NO

## 2023-07-07 SDOH — ECONOMIC STABILITY: INCOME INSECURITY: HOW HARD IS IT FOR YOU TO PAY FOR THE VERY BASICS LIKE FOOD, HOUSING, MEDICAL CARE, AND HEATING?: NOT HARD AT ALL

## 2023-07-11 ENCOUNTER — TELEPHONE (OUTPATIENT)
Dept: PRIMARY CARE CLINIC | Age: 88
End: 2023-07-11

## 2023-07-20 LAB
ANION GAP SERPL CALCULATED.3IONS-SCNC: 11 MMOL/L (ref 7–16)
BUN SERPL-MCNC: 19 MG/DL (ref 6–23)
CALCIUM SERPL-MCNC: 9.1 MG/DL (ref 8.6–10.2)
CHLORIDE SERPL-SCNC: 102 MMOL/L (ref 98–107)
CO2 SERPL-SCNC: 23 MMOL/L (ref 22–29)
CREAT SERPL-MCNC: 1.2 MG/DL (ref 0.7–1.2)
GFR SERPL CREATININE-BSD FRML MDRD: 59 ML/MIN/1.73M2
GLUCOSE SERPL-MCNC: 100 MG/DL (ref 74–99)
POTASSIUM SERPL-SCNC: 5.3 MMOL/L (ref 3.5–5)
SODIUM SERPL-SCNC: 136 MMOL/L (ref 132–146)

## 2023-07-24 RX ORDER — ALLOPURINOL 300 MG/1
TABLET ORAL
Qty: 90 TABLET | Refills: 0 | Status: SHIPPED | OUTPATIENT
Start: 2023-07-24

## 2023-08-21 ENCOUNTER — OFFICE VISIT (OUTPATIENT)
Dept: PRIMARY CARE CLINIC | Age: 88
End: 2023-08-21
Payer: MEDICARE

## 2023-08-21 VITALS
OXYGEN SATURATION: 97 % | DIASTOLIC BLOOD PRESSURE: 62 MMHG | SYSTOLIC BLOOD PRESSURE: 104 MMHG | WEIGHT: 157 LBS | RESPIRATION RATE: 14 BRPM | TEMPERATURE: 97.8 F | HEIGHT: 66 IN | BODY MASS INDEX: 25.23 KG/M2 | HEART RATE: 78 BPM

## 2023-08-21 DIAGNOSIS — I25.10 CORONARY ARTERY DISEASE INVOLVING NATIVE CORONARY ARTERY OF NATIVE HEART WITHOUT ANGINA PECTORIS: Primary | Chronic | ICD-10-CM

## 2023-08-21 DIAGNOSIS — N18.31 STAGE 3A CHRONIC KIDNEY DISEASE (CKD) (HCC): ICD-10-CM

## 2023-08-21 DIAGNOSIS — I10 ESSENTIAL HYPERTENSION: ICD-10-CM

## 2023-08-21 PROCEDURE — G8417 CALC BMI ABV UP PARAM F/U: HCPCS | Performed by: INTERNAL MEDICINE

## 2023-08-21 PROCEDURE — G8427 DOCREV CUR MEDS BY ELIG CLIN: HCPCS | Performed by: INTERNAL MEDICINE

## 2023-08-21 PROCEDURE — 1036F TOBACCO NON-USER: CPT | Performed by: INTERNAL MEDICINE

## 2023-08-21 PROCEDURE — 99213 OFFICE O/P EST LOW 20 MIN: CPT | Performed by: INTERNAL MEDICINE

## 2023-08-21 PROCEDURE — 1123F ACP DISCUSS/DSCN MKR DOCD: CPT | Performed by: INTERNAL MEDICINE

## 2023-08-21 NOTE — PROGRESS NOTES
Does not apply route Dx - Gait instability  Duration - 8/27 1 each 0    alirocumab (PRALUENT) 75 MG/ML SOAJ injection pen Inject 1 mL into the skin every 14 days 2.24 mL 6    isosorbide mononitrate (IMDUR) 30 MG extended release tablet TAKE ONE TABLET BY MOUTH DAILY 90 tablet 3    carvedilol (COREG) 3.125 MG tablet TAKE ONE TABLET BY MOUTH TWO TIMES A  tablet 0    vitamin D (ERGOCALCIFEROL) 1.25 MG (81695 UT) CAPS capsule Take 1 capsule by mouth every 30 days      sodium bicarbonate 650 MG tablet Take 1 tablet by mouth 2 times daily      tamsulosin (FLOMAX) 0.4 MG capsule Take 1 capsule by mouth daily      ezetimibe (ZETIA) 10 MG tablet Take 1 tablet by mouth daily 30 tablet 6    metFORMIN (GLUCOPHAGE) 1000 MG tablet Take 1 tablet by mouth 2 times daily (with meals) 180 tablet 3    nateglinide (STARLIX) 120 MG tablet TAKE ONE TABLET BY MOUTH THREE TIMES A DAY BEFORE MEALS 270 tablet 1    atorvastatin (LIPITOR) 80 MG tablet Take 1 tablet by mouth daily      glucose monitoring kit (FREESTYLE) monitoring kit 1 kit by Does not apply route daily as needed (as directed) 1 kit 0    blood glucose test strips (ASCENSIA AUTODISC VI;ONE TOUCH ULTRA TEST VI) strip 1 each by In Vitro route daily As needed.  One Touch Ultra 2 Test strips DX: E11.9 100 each 3    Blood Glucose Calibration (OT ULTRA/FASTTK CNTRL SOLN) SOLN 1 Bottle by In Vitro route as needed (as directed) 1 each 3    docusate sodium (COLACE) 100 MG capsule Take 1 capsule by mouth daily as needed for Constipation      omeprazole (PRILOSEC) 20 MG delayed release capsule Take 1 capsule by mouth daily PRN      nitroGLYCERIN (NITROSTAT) 0.4 MG SL tablet Place 1 tablet under the tongue every 5 minutes as needed for Chest pain 25 tablet 3    aspirin 81 MG EC tablet Take 1 tablet by mouth daily      vitamin B-12 (CYANOCOBALAMIN) 1000 MCG tablet Take 1 tablet by mouth daily      folic acid (FOLVITE) 844 MCG tablet Take 1 tablet by mouth daily

## 2023-10-02 LAB
25(OH)D3 SERPL-MCNC: 35.1 NG/ML (ref 30–100)
ALBUMIN SERPL-MCNC: 4.2 G/DL (ref 3.5–5.2)
ALP SERPL-CCNC: 41 U/L (ref 40–129)
ALT SERPL-CCNC: 38 U/L (ref 0–40)
ANION GAP SERPL CALCULATED.3IONS-SCNC: 15 MMOL/L (ref 7–16)
AST SERPL-CCNC: 47 U/L (ref 0–39)
BILIRUB SERPL-MCNC: 0.3 MG/DL (ref 0–1.2)
BUN SERPL-MCNC: 24 MG/DL (ref 6–23)
CALCIUM SERPL-MCNC: 9.1 MG/DL (ref 8.6–10.2)
CHLORIDE SERPL-SCNC: 106 MMOL/L (ref 98–107)
CO2 SERPL-SCNC: 22 MMOL/L (ref 22–29)
CREAT SERPL-MCNC: 1.7 MG/DL (ref 0.7–1.2)
ERYTHROCYTE [DISTWIDTH] IN BLOOD BY AUTOMATED COUNT: 15.5 % (ref 11.5–15)
GFR SERPL CREATININE-BSD FRML MDRD: 39 ML/MIN/1.73M2
GLUCOSE SERPL-MCNC: 88 MG/DL (ref 74–99)
HCT VFR BLD AUTO: 31.7 % (ref 37–54)
HGB BLD-MCNC: 9.9 G/DL (ref 12.5–16.5)
MCH RBC QN AUTO: 28.2 PG (ref 26–35)
MCHC RBC AUTO-ENTMCNC: 31.2 G/DL (ref 32–34.5)
MCV RBC AUTO: 90.3 FL (ref 80–99.9)
PHOSPHATE SERPL-MCNC: 4.6 MG/DL (ref 2.5–4.5)
PLATELET # BLD AUTO: 209 K/UL (ref 130–450)
PMV BLD AUTO: 12.5 FL (ref 7–12)
POTASSIUM SERPL-SCNC: 4.4 MMOL/L (ref 3.5–5)
PROT SERPL-MCNC: 8.3 G/DL (ref 6.4–8.3)
PTH-INTACT SERPL-MCNC: 27.5 PG/ML (ref 15–65)
RBC # BLD AUTO: 3.51 M/UL (ref 3.8–5.8)
SODIUM SERPL-SCNC: 143 MMOL/L (ref 132–146)
WBC OTHER # BLD: 86.3 K/UL (ref 4.5–11.5)

## 2023-10-16 NOTE — PROGRESS NOTES
[facility] Cardiology Office Note      Patient name: Tamra Rand    Reason for visit:  SOB, LE swelling     Cardiologist: Dr. Nelly Rendon    Primary Care Physician: Jake Yuen DO    Date of service: 10/17/2023    Chief Complaint: SOB, LE swelling    HPI: 81 yo male who presents with complaints of SOB/JANE and LE swelling. He relates chronic JANE due to longstanding anemia for several years. However over the last week, he has noted SOB both at rest and with exertion, worse compared to baseline. Symptoms are also associated with 5 lb weight gain, LE swelling and orthopnea. He denies chest pain, palpitations or near syncope. He spoke to Dr. Nelly Rendon and was started on furosemide 40 mg BID (started on 10/14/23). He has noted improvement and states he has lost a couple pounds, however response was not as brisk as he had hoped. He has avoided ER evaluation due to prolonged stay a few months ago. Allergies: Allergies   Allergen Reactions    Iodine Rash     IV Iodine    Benadryl [Diphenhydramine]      IV BENADRYL     Ramipril      cough       Home medications:    Current Outpatient Medications   Medication Sig Dispense Refill    furosemide (LASIX) 40 MG tablet Take 1 tablet by mouth 2 times daily      allopurinol (ZYLOPRIM) 300 MG tablet TAKE ONE TABLET BY MOUTH EVERY DAY 90 tablet 0    LORazepam (ATIVAN) 1 MG tablet Take 1 tablet by mouth daily as needed for Anxiety (For anxiety and insomnia) for up to 360 days. 15 tablet 4    zolpidem (AMBIEN) 5 MG tablet Take 1 tablet by mouth nightly as needed for Sleep.  10 tablet 3    Handicap Placard MISC by Does not apply route Dx - Gait instability  Duration - 8/27 1 each 0    alirocumab (PRALUENT) 75 MG/ML SOAJ injection pen Inject 1 mL into the skin every 14 days 2.24 mL 6    isosorbide mononitrate (IMDUR) 30 MG extended release tablet TAKE ONE TABLET BY MOUTH DAILY 90 tablet 3    carvedilol (COREG) 3.125 MG tablet TAKE ONE TABLET BY MOUTH TWO

## 2023-10-17 ENCOUNTER — OFFICE VISIT (OUTPATIENT)
Dept: CARDIOLOGY CLINIC | Age: 88
End: 2023-10-17
Payer: MEDICARE

## 2023-10-17 VITALS
HEIGHT: 66 IN | SYSTOLIC BLOOD PRESSURE: 148 MMHG | OXYGEN SATURATION: 95 % | WEIGHT: 167.4 LBS | TEMPERATURE: 97.6 F | DIASTOLIC BLOOD PRESSURE: 72 MMHG | HEART RATE: 78 BPM | BODY MASS INDEX: 26.9 KG/M2

## 2023-10-17 DIAGNOSIS — N18.31 STAGE 3A CHRONIC KIDNEY DISEASE (CKD) (HCC): ICD-10-CM

## 2023-10-17 DIAGNOSIS — I50.33 ACUTE ON CHRONIC HEART FAILURE WITH PRESERVED EJECTION FRACTION (HFPEF) (HCC): ICD-10-CM

## 2023-10-17 DIAGNOSIS — R06.02 SOBOE (SHORTNESS OF BREATH ON EXERTION): ICD-10-CM

## 2023-10-17 DIAGNOSIS — I25.10 CORONARY ARTERY DISEASE INVOLVING NATIVE CORONARY ARTERY OF NATIVE HEART WITHOUT ANGINA PECTORIS: Primary | ICD-10-CM

## 2023-10-17 PROCEDURE — G8417 CALC BMI ABV UP PARAM F/U: HCPCS | Performed by: PHYSICIAN ASSISTANT

## 2023-10-17 PROCEDURE — 93000 ELECTROCARDIOGRAM COMPLETE: CPT | Performed by: INTERNAL MEDICINE

## 2023-10-17 PROCEDURE — G8484 FLU IMMUNIZE NO ADMIN: HCPCS | Performed by: PHYSICIAN ASSISTANT

## 2023-10-17 PROCEDURE — 1123F ACP DISCUSS/DSCN MKR DOCD: CPT | Performed by: PHYSICIAN ASSISTANT

## 2023-10-17 PROCEDURE — G8427 DOCREV CUR MEDS BY ELIG CLIN: HCPCS | Performed by: PHYSICIAN ASSISTANT

## 2023-10-17 PROCEDURE — 99204 OFFICE O/P NEW MOD 45 MIN: CPT | Performed by: PHYSICIAN ASSISTANT

## 2023-10-17 PROCEDURE — 1036F TOBACCO NON-USER: CPT | Performed by: PHYSICIAN ASSISTANT

## 2023-10-17 RX ORDER — FUROSEMIDE 40 MG/1
40 TABLET ORAL 2 TIMES DAILY
COMMUNITY

## 2023-10-17 ASSESSMENT — EJECTION FRACTION
EF_VALUE: 60%
EF_SOURCE: 2D ECHO

## 2023-10-17 NOTE — PATIENT INSTRUCTIONS
[facility]   [address] [place] [address], [address] [place] [address]     [phone]

## 2023-10-18 ENCOUNTER — HOSPITAL ENCOUNTER (OUTPATIENT)
Age: 88
Discharge: HOME | End: 2023-10-20
Payer: MEDICARE

## 2023-10-18 ENCOUNTER — HOSPITAL ENCOUNTER (OUTPATIENT)
Dept: GENERAL RADIOLOGY | Age: 88
Discharge: HOME | End: 2023-10-20
Payer: MEDICARE

## 2023-10-18 ENCOUNTER — HOSPITAL ENCOUNTER (OUTPATIENT)
Dept: OTHER | Age: 88
Setting detail: THERAPIES SERIES
Discharge: HOME | End: 2023-10-18
Payer: MEDICARE

## 2023-10-18 ENCOUNTER — HOSPITAL ENCOUNTER (OUTPATIENT)
Age: 88
Discharge: HOME | End: 2023-10-18
Payer: MEDICARE

## 2023-10-18 ENCOUNTER — HOSPITAL ENCOUNTER (OUTPATIENT)
Dept: OTHER | Age: 88
Discharge: HOME | End: 2023-10-18

## 2023-10-18 ENCOUNTER — TELEPHONE (OUTPATIENT)
Dept: OTHER | Age: 88
End: 2023-10-18

## 2023-10-18 VITALS
WEIGHT: 161 LBS | RESPIRATION RATE: 18 BRPM | OXYGEN SATURATION: 93 % | BODY MASS INDEX: 25.99 KG/M2 | DIASTOLIC BLOOD PRESSURE: 82 MMHG | HEART RATE: 76 BPM | SYSTOLIC BLOOD PRESSURE: 132 MMHG

## 2023-10-18 DIAGNOSIS — R06.02 SOBOE (SHORTNESS OF BREATH ON EXERTION): ICD-10-CM

## 2023-10-18 LAB
ANION GAP SERPL CALCULATED.3IONS-SCNC: 15 MMOL/L (ref 7–16)
BNP SERPL-MCNC: 2110 PG/ML (ref 0–450)
BUN SERPL-MCNC: 29 MG/DL (ref 6–23)
CALCIUM SERPL-MCNC: 8 MG/DL (ref 8.6–10.2)
CHLORIDE SERPL-SCNC: 100 MMOL/L (ref 98–107)
CO2 SERPL-SCNC: 20 MMOL/L (ref 22–29)
CREAT SERPL-MCNC: 2.2 MG/DL (ref 0.7–1.2)
ERYTHROCYTE [DISTWIDTH] IN BLOOD BY AUTOMATED COUNT: 16.4 % (ref 11.5–15)
GFR SERPL CREATININE-BSD FRML MDRD: 29 ML/MIN/1.73M2
GLUCOSE SERPL-MCNC: 117 MG/DL (ref 74–99)
HCT VFR BLD AUTO: 24.8 % (ref 37–54)
HGB BLD-MCNC: 7.8 G/DL (ref 12.5–16.5)
INR PPP: 1.4
MCH RBC QN AUTO: 26.9 PG (ref 26–35)
MCHC RBC AUTO-ENTMCNC: 31.5 G/DL (ref 32–34.5)
MCV RBC AUTO: 85.5 FL (ref 80–99.9)
PLATELET # BLD AUTO: 97 K/UL (ref 130–450)
PLATELET CONFIRMATION: NORMAL
PMV BLD AUTO: 12.4 FL (ref 7–12)
POTASSIUM SERPL-SCNC: 3.4 MMOL/L (ref 3.5–5)
PROTHROMBIN TIME: 15.9 SEC (ref 9.3–12.4)
RBC # BLD AUTO: 2.9 M/UL (ref 3.8–5.8)
SODIUM SERPL-SCNC: 135 MMOL/L (ref 132–146)
WBC OTHER # BLD: 128.5 K/UL (ref 4.5–11.5)

## 2023-10-18 PROCEDURE — 80048 BASIC METABOLIC PNL TOTAL CA: CPT

## 2023-10-18 PROCEDURE — 36415 COLL VENOUS BLD VENIPUNCTURE: CPT

## 2023-10-18 PROCEDURE — 99205 OFFICE O/P NEW HI 60 MIN: CPT

## 2023-10-18 PROCEDURE — 83880 ASSAY OF NATRIURETIC PEPTIDE: CPT

## 2023-10-18 PROCEDURE — 6360000002 HC RX W HCPCS

## 2023-10-18 PROCEDURE — 2580000003 HC RX 258

## 2023-10-18 PROCEDURE — 96374 THER/PROPH/DIAG INJ IV PUSH: CPT

## 2023-10-18 PROCEDURE — 85610 PROTHROMBIN TIME: CPT

## 2023-10-18 PROCEDURE — 71046 X-RAY EXAM CHEST 2 VIEWS: CPT

## 2023-10-18 PROCEDURE — 85027 COMPLETE CBC AUTOMATED: CPT

## 2023-10-18 RX ORDER — SODIUM CHLORIDE 0.9 % (FLUSH) 0.9 %
10 SYRINGE (ML) INJECTION ONCE
Status: COMPLETED | OUTPATIENT
Start: 2023-10-18 | End: 2023-10-18

## 2023-10-18 RX ORDER — FUROSEMIDE 10 MG/ML
40 INJECTION INTRAMUSCULAR; INTRAVENOUS ONCE
Status: COMPLETED | OUTPATIENT
Start: 2023-10-18 | End: 2023-10-18

## 2023-10-18 RX ORDER — SODIUM CHLORIDE 0.9 % (FLUSH) 0.9 %
SYRINGE (ML) INJECTION
Status: COMPLETED
Start: 2023-10-18 | End: 2023-10-18

## 2023-10-18 RX ORDER — FUROSEMIDE 10 MG/ML
INJECTION INTRAMUSCULAR; INTRAVENOUS
Status: COMPLETED
Start: 2023-10-18 | End: 2023-10-18

## 2023-10-18 RX ADMIN — FUROSEMIDE 40 MG: 10 INJECTION INTRAMUSCULAR; INTRAVENOUS at 15:41

## 2023-10-18 RX ADMIN — Medication 10 ML: at 15:42

## 2023-10-18 RX ADMIN — FUROSEMIDE 40 MG: 10 INJECTION, SOLUTION INTRAMUSCULAR; INTRAVENOUS at 15:41

## 2023-10-18 NOTE — TELEPHONE ENCOUNTER
3:48 PM   Inbasket message sent to Dr. Bertha Miranda re: assessment, BP, and abnormal labs from today's CHF clinic.      Electronically signed by Sha Weeks RN on 10/18/2023 at 3:49 PM        Labs also faxed to Dr. Basilio Roman (nephrology) per patient's request.

## 2023-10-18 NOTE — PROGRESS NOTES
Euvolemic          [x] Hypervolemic, with increase from baseline:  [x] Shortness of breath/JANE  [x] JVD  [] HJR  [x] Abnormal lung assessment:   [x] Orthopnea  [] PND  [] Decreased urinary response to oral diuretic   [] Scrotal swelling   [x] Lower extremity edema  [] Compression stockings provided  [x] Decline in functional capacity (unable to perform activities they had previously been able to do)  [x] Weight gain     [] IV diuretics given IV LASIX 40 mg  [] Provider notified of recurrent IV diuretic use    Additional Notes:     Patient seen by Bala Burton in office yesterday; felt to be hypervolemic and sent to CHF clinic today for a visit. Patient reports increase in weight, pitting pedal edema (+2) with worsening SOB. +JVD, +fine crackles in RLL. Lasix recently increased to 40 mg BID. Patient does report a good urinary response to diuretic increase. On assessment patient still hypervolemic. Agreeable to IVP diuretic. Does have a follow up with Dr. Bora Martin next week. Abnormal labs routed to nephrology. Message (Cryptonator) sent to Dr. Bora Martin re: patient's assessment, VS, and labs.      [x]Lab work obtained    [x] Patient/Family Educated On:  [x] HF zones (Green, Yellow, Red) and aware of when to take action   [x] Daily weights  [] Scale provided   [x] Low sodium diet (2000 mg)  Barriers to compliance  [] Refuses to monitor diet  [] Socioeconomic difficulties  [] Unable to cook for self (use of frozen meals, can goods, etc)  [] CHF CHW consulted  [] Low sodium meal delivery options given to patient  [] Dietician consulted   [] Low sodium recipes provided  [] Sodium free seasoning provided   [x] Fluid intake 6-8 cups (around 64 oz)  [x] Reviewed currently prescribed medications with patient, educated on importance of compliance and answered any questions regarding their medication  [] Pill box provided to patient  [] Patient using pill packing pharmacy   [] CPAP/BiPAP use  [] Low impact exercise /

## 2023-10-19 ENCOUNTER — TELEPHONE (OUTPATIENT)
Dept: CARDIOLOGY CLINIC | Age: 88
End: 2023-10-19

## 2023-10-19 NOTE — TELEPHONE ENCOUNTER
----- Message from Burke Easton MD sent at 10/19/2023  7:47 AM EDT -----  Regarding: Harleen Sepulveda - Call Dr Valentina Matute and ask him to eat 1/2 banana or drink some orange juice daily due to low Potassium (3.4). He had history of Hyperkalemia, so I would not Rx any Potassium at this time. Will check BMP next week  during OV      ----- Message -----  From: Marisel Boyer RN  Sent: 10/18/2023   3:48 PM EDT  To: Burke Easton MD; Linda Carranzaonur  Subject: Abnormal labs in chf clinic - new pt             Brendan Patel,    We seen Dr. Valentina Matute in clinic today. Hypervolemic. Oral lasix increased 40 mg BID a few days ago; was given IV Lasix 40 mg today in clinic.  /82    Labs are:    10/18/23 14:00  Sodium: 135  Potassium: 3.4 (L)  Chloride: 100  CO2: 20 (L)  BUN,BUNPL: 29 (H)  Creatinine: 2.2 (H)  Anion Gap: 15  Est, Glom Filt Rate: 29 (L)  Glucose, Random: 117 (H)  CALCIUM, SERUM, 406034: 8.0 (L)  Pro-BNP: 2,110 (H)    --Is not on oral potassium- K 3.4 today. Has hx: hyperkalemia. - SCr 2.2     You do see patient in office next week. Please advise.      Thank you \,    Marisel Boyer RN

## 2023-10-19 NOTE — TELEPHONE ENCOUNTER
----- Message from Rudolph Demarco MD sent at 10/19/2023  7:50 AM EDT -----  Regarding: Andres Jamison that his CxR showed some fluid in left lung (Pleural effusion)   Continue Lasix. Call if more SOB.   I see him next week      ----- Message -----  From: Juliane Varghese Incoming Radiant Results From [facility]  Sent: 10/19/2023   6:43 AM EDT  To: Rudolph Demarco MD

## 2023-10-24 DIAGNOSIS — D46.9 MDS (MYELODYSPLASTIC SYNDROME) (HCC): Primary | ICD-10-CM

## 2023-10-24 RX ORDER — SODIUM CHLORIDE 9 MG/ML
25 INJECTION, SOLUTION INTRAVENOUS PRN
Status: CANCELLED | OUTPATIENT
Start: 2023-10-25

## 2023-10-24 RX ORDER — EPINEPHRINE 1 MG/ML
0.3 INJECTION, SOLUTION, CONCENTRATE INTRAVENOUS PRN
OUTPATIENT
Start: 2023-10-25

## 2023-10-24 RX ORDER — ACETAMINOPHEN 325 MG/1
650 TABLET ORAL ONCE
Status: CANCELLED | OUTPATIENT
Start: 2023-10-25 | End: 2023-10-25

## 2023-10-24 RX ORDER — SODIUM CHLORIDE 9 MG/ML
20 INJECTION, SOLUTION INTRAVENOUS CONTINUOUS
Status: CANCELLED | OUTPATIENT
Start: 2023-10-25

## 2023-10-24 RX ORDER — SODIUM CHLORIDE 9 MG/ML
25 INJECTION, SOLUTION INTRAVENOUS PRN
Status: CANCELLED | OUTPATIENT
Start: 2023-10-24

## 2023-10-24 RX ORDER — ACETAMINOPHEN 325 MG/1
650 TABLET ORAL
OUTPATIENT
Start: 2023-10-25

## 2023-10-24 RX ORDER — SODIUM CHLORIDE 9 MG/ML
INJECTION, SOLUTION INTRAVENOUS CONTINUOUS
OUTPATIENT
Start: 2023-10-25

## 2023-10-24 RX ORDER — SODIUM CHLORIDE 9 MG/ML
20 INJECTION, SOLUTION INTRAVENOUS CONTINUOUS
Status: CANCELLED | OUTPATIENT
Start: 2023-10-24

## 2023-10-24 RX ORDER — ACETAMINOPHEN 325 MG/1
650 TABLET ORAL
OUTPATIENT
Start: 2023-10-24

## 2023-10-24 RX ORDER — ALBUTEROL SULFATE 90 UG/1
4 AEROSOL, METERED RESPIRATORY (INHALATION) PRN
OUTPATIENT
Start: 2023-10-24

## 2023-10-24 RX ORDER — DIPHENHYDRAMINE HCL 25 MG
25 TABLET ORAL ONCE
Status: CANCELLED | OUTPATIENT
Start: 2023-10-25 | End: 2023-10-25

## 2023-10-24 RX ORDER — SODIUM CHLORIDE 9 MG/ML
INJECTION, SOLUTION INTRAVENOUS CONTINUOUS
OUTPATIENT
Start: 2023-10-24

## 2023-10-24 RX ORDER — SODIUM CHLORIDE 0.9 % (FLUSH) 0.9 %
5-40 SYRINGE (ML) INJECTION PRN
Status: CANCELLED | OUTPATIENT
Start: 2023-10-25

## 2023-10-24 RX ORDER — EPINEPHRINE 1 MG/ML
0.3 INJECTION, SOLUTION, CONCENTRATE INTRAVENOUS PRN
OUTPATIENT
Start: 2023-10-24

## 2023-10-24 RX ORDER — SODIUM CHLORIDE 0.9 % (FLUSH) 0.9 %
5-40 SYRINGE (ML) INJECTION PRN
Status: CANCELLED | OUTPATIENT
Start: 2023-10-24

## 2023-10-24 RX ORDER — DIPHENHYDRAMINE HYDROCHLORIDE 50 MG/ML
50 INJECTION INTRAMUSCULAR; INTRAVENOUS
OUTPATIENT
Start: 2023-10-25

## 2023-10-24 RX ORDER — ACETAMINOPHEN 325 MG/1
650 TABLET ORAL ONCE
Status: CANCELLED | OUTPATIENT
Start: 2023-10-24 | End: 2023-10-24

## 2023-10-24 RX ORDER — ONDANSETRON 2 MG/ML
8 INJECTION INTRAMUSCULAR; INTRAVENOUS
OUTPATIENT
Start: 2023-10-25

## 2023-10-24 RX ORDER — ALBUTEROL SULFATE 90 UG/1
4 AEROSOL, METERED RESPIRATORY (INHALATION) PRN
OUTPATIENT
Start: 2023-10-25

## 2023-10-24 RX ORDER — ONDANSETRON 2 MG/ML
8 INJECTION INTRAMUSCULAR; INTRAVENOUS
OUTPATIENT
Start: 2023-10-24

## 2023-10-24 RX ORDER — DIPHENHYDRAMINE HYDROCHLORIDE 50 MG/ML
50 INJECTION INTRAMUSCULAR; INTRAVENOUS
OUTPATIENT
Start: 2023-10-24

## 2023-10-24 RX ORDER — FAMOTIDINE 10 MG/ML
20 INJECTION, SOLUTION INTRAVENOUS
OUTPATIENT
Start: 2023-10-25

## 2023-10-24 RX ORDER — DIPHENHYDRAMINE HCL 25 MG
25 TABLET ORAL ONCE
Status: CANCELLED | OUTPATIENT
Start: 2023-10-24 | End: 2023-10-24

## 2023-10-25 ENCOUNTER — OFFICE VISIT (OUTPATIENT)
Dept: CARDIOLOGY CLINIC | Age: 88
End: 2023-10-25
Payer: MEDICARE

## 2023-10-25 ENCOUNTER — HOSPITAL ENCOUNTER (OUTPATIENT)
Dept: INFUSION THERAPY | Age: 88
Discharge: HOME | End: 2023-10-25
Payer: MEDICARE

## 2023-10-25 VITALS
SYSTOLIC BLOOD PRESSURE: 133 MMHG | OXYGEN SATURATION: 97 % | HEART RATE: 79 BPM | DIASTOLIC BLOOD PRESSURE: 68 MMHG | TEMPERATURE: 97.5 F | RESPIRATION RATE: 18 BRPM

## 2023-10-25 VITALS
HEIGHT: 66 IN | RESPIRATION RATE: 16 BRPM | SYSTOLIC BLOOD PRESSURE: 140 MMHG | BODY MASS INDEX: 25.23 KG/M2 | WEIGHT: 157 LBS | DIASTOLIC BLOOD PRESSURE: 68 MMHG | HEART RATE: 80 BPM | OXYGEN SATURATION: 97 %

## 2023-10-25 DIAGNOSIS — N18.9 CHRONIC KIDNEY DISEASE, UNSPECIFIED CKD STAGE: ICD-10-CM

## 2023-10-25 DIAGNOSIS — G47.00 INSOMNIA, UNSPECIFIED TYPE: ICD-10-CM

## 2023-10-25 DIAGNOSIS — I10 ESSENTIAL HYPERTENSION: ICD-10-CM

## 2023-10-25 DIAGNOSIS — I25.10 CORONARY ARTERY DISEASE INVOLVING NATIVE CORONARY ARTERY OF NATIVE HEART WITHOUT ANGINA PECTORIS: Primary | ICD-10-CM

## 2023-10-25 DIAGNOSIS — D46.9 MDS (MYELODYSPLASTIC SYNDROME) (HCC): Primary | ICD-10-CM

## 2023-10-25 DIAGNOSIS — I50.33 ACUTE ON CHRONIC HEART FAILURE WITH PRESERVED EJECTION FRACTION (HFPEF) (HCC): ICD-10-CM

## 2023-10-25 PROCEDURE — G8427 DOCREV CUR MEDS BY ELIG CLIN: HCPCS | Performed by: INTERNAL MEDICINE

## 2023-10-25 PROCEDURE — 93000 ELECTROCARDIOGRAM COMPLETE: CPT | Performed by: INTERNAL MEDICINE

## 2023-10-25 PROCEDURE — G8417 CALC BMI ABV UP PARAM F/U: HCPCS | Performed by: INTERNAL MEDICINE

## 2023-10-25 PROCEDURE — 2580000003 HC RX 258: Performed by: NURSE PRACTITIONER

## 2023-10-25 PROCEDURE — 99214 OFFICE O/P EST MOD 30 MIN: CPT | Performed by: INTERNAL MEDICINE

## 2023-10-25 PROCEDURE — 1036F TOBACCO NON-USER: CPT | Performed by: INTERNAL MEDICINE

## 2023-10-25 PROCEDURE — P9016 RBC LEUKOCYTES REDUCED: HCPCS

## 2023-10-25 PROCEDURE — 6370000000 HC RX 637 (ALT 250 FOR IP): Performed by: NURSE PRACTITIONER

## 2023-10-25 PROCEDURE — 1123F ACP DISCUSS/DSCN MKR DOCD: CPT | Performed by: INTERNAL MEDICINE

## 2023-10-25 PROCEDURE — 96375 TX/PRO/DX INJ NEW DRUG ADDON: CPT

## 2023-10-25 PROCEDURE — G8484 FLU IMMUNIZE NO ADMIN: HCPCS | Performed by: INTERNAL MEDICINE

## 2023-10-25 PROCEDURE — 96374 THER/PROPH/DIAG INJ IV PUSH: CPT

## 2023-10-25 PROCEDURE — 36430 TRANSFUSION BLD/BLD COMPNT: CPT

## 2023-10-25 PROCEDURE — 6360000002 HC RX W HCPCS: Performed by: NURSE PRACTITIONER

## 2023-10-25 RX ORDER — SODIUM CHLORIDE 0.9 % (FLUSH) 0.9 %
5-40 SYRINGE (ML) INJECTION PRN
Status: DISCONTINUED | OUTPATIENT
Start: 2023-10-25 | End: 2023-10-26 | Stop reason: HOSPADM

## 2023-10-25 RX ORDER — ZOLPIDEM TARTRATE 5 MG/1
5 TABLET ORAL NIGHTLY PRN
Qty: 20 TABLET | Refills: 3 | Status: SHIPPED | OUTPATIENT
Start: 2023-10-25 | End: 2024-01-23

## 2023-10-25 RX ORDER — SODIUM CHLORIDE 9 MG/ML
25 INJECTION, SOLUTION INTRAVENOUS PRN
Status: DISCONTINUED | OUTPATIENT
Start: 2023-10-25 | End: 2023-10-26 | Stop reason: HOSPADM

## 2023-10-25 RX ORDER — ACETAMINOPHEN 325 MG/1
650 TABLET ORAL ONCE
Status: COMPLETED | OUTPATIENT
Start: 2023-10-25 | End: 2023-10-25

## 2023-10-25 RX ORDER — FUROSEMIDE 40 MG/1
40 TABLET ORAL DAILY
Qty: 30 TABLET | Refills: 6 | Status: SHIPPED | OUTPATIENT
Start: 2023-10-25

## 2023-10-25 RX ORDER — SODIUM CHLORIDE 9 MG/ML
20 INJECTION, SOLUTION INTRAVENOUS CONTINUOUS
Status: DISCONTINUED | OUTPATIENT
Start: 2023-10-25 | End: 2023-10-26 | Stop reason: HOSPADM

## 2023-10-25 RX ORDER — HYDROXYUREA 500 MG/1
CAPSULE ORAL
COMMUNITY
Start: 2023-10-17

## 2023-10-25 RX ORDER — DIPHENHYDRAMINE HCL 25 MG
25 TABLET ORAL ONCE
Status: COMPLETED | OUTPATIENT
Start: 2023-10-25 | End: 2023-10-25

## 2023-10-25 RX ADMIN — SODIUM CHLORIDE 20 ML/HR: 9 INJECTION, SOLUTION INTRAVENOUS at 10:04

## 2023-10-25 RX ADMIN — HYDROCORTISONE SODIUM SUCCINATE 25 MG: 100 INJECTION, POWDER, FOR SOLUTION INTRAMUSCULAR; INTRAVENOUS at 10:02

## 2023-10-25 RX ADMIN — DIPHENHYDRAMINE HYDROCHLORIDE 25 MG: 25 TABLET ORAL at 10:01

## 2023-10-25 RX ADMIN — ACETAMINOPHEN 650 MG: 325 TABLET, FILM COATED ORAL at 10:01

## 2023-10-25 NOTE — PROGRESS NOTES
9/2017     S/p LAP cholecystectomy: 4/2018     Anxiety - mild, stable on prn Lorazepam (ATIVAN) 1 Mg     Preventive Cardiology: Low cholesterol diet, regular exercise as tolerate, and gradual weight loss discussed. Other orders  -     furosemide (LASIX) 40 MG tablet; Take 1 tablet by mouth daily     Above recommendations discussed with him and his wife, Mortimer Spar. Decrease Lasix to once daily, monitor labs/BMP in 2 weeks   Current medications, problem list and results of prior tests (as available) were reviewed at today's visit   All questions answered about cardiac diagnoses and cardiac medications. Continue current medications. Monitor BP and heart rates. Compliance with medications and f/u with all physicians discussed. Risk factor modification based on risk profile discussed. Call if any exertional chest pain, short of breath, dizzy or palpitations. Follow up in 2 weeks ths or earlier if needed.          [facility] Cardiology  [address] [facility], [address] [place] [address]  [phone]

## 2023-10-26 DIAGNOSIS — D46.4: Primary | ICD-10-CM

## 2023-10-26 LAB
ABO/RH: NORMAL
ANTIBODY SCREEN: NEGATIVE
ARM BAND NUMBER: NORMAL
BLOOD BANK BLOOD PRODUCT EXPIRATION DATE: NORMAL
BLOOD BANK DISPENSE STATUS: NORMAL
BLOOD BANK ISBT PRODUCT BLOOD TYPE: 7300
BLOOD BANK PRODUCT CODE: NORMAL
BLOOD BANK SAMPLE EXPIRATION: NORMAL
BLOOD BANK UNIT TYPE AND RH: NORMAL
BPU ID: NORMAL
COMPONENT: NORMAL
CROSSMATCH RESULT: NORMAL
TRANSFUSION STATUS: NORMAL
UNIT DIVISION: 0
UNIT ISSUE DATE/TIME: NORMAL

## 2023-10-27 ENCOUNTER — HOSPITAL ENCOUNTER (OUTPATIENT)
Dept: INTERVENTIONAL RADIOLOGY/VASCULAR | Age: 88
End: 2023-10-27
Payer: MEDICARE

## 2023-10-27 VITALS
OXYGEN SATURATION: 98 % | SYSTOLIC BLOOD PRESSURE: 148 MMHG | RESPIRATION RATE: 18 BRPM | DIASTOLIC BLOOD PRESSURE: 72 MMHG | BODY MASS INDEX: 24.91 KG/M2 | TEMPERATURE: 97.6 F | WEIGHT: 155 LBS | HEIGHT: 66 IN | HEART RATE: 83 BPM

## 2023-10-27 DIAGNOSIS — D46.4 ANEMIA, REFRACTORY (HCC): ICD-10-CM

## 2023-10-27 DIAGNOSIS — D46.4: ICD-10-CM

## 2023-10-27 LAB
ERYTHROCYTE [DISTWIDTH] IN BLOOD BY AUTOMATED COUNT: 17.3 % (ref 11.5–15)
HCT VFR BLD AUTO: 27.9 % (ref 37–54)
HGB BLD-MCNC: 8.7 G/DL (ref 12.5–16.5)
INR PPP: 1.2
MCH RBC QN AUTO: 27.2 PG (ref 26–35)
MCHC RBC AUTO-ENTMCNC: 31.2 G/DL (ref 32–34.5)
MCV RBC AUTO: 87.2 FL (ref 80–99.9)
PLATELET CONFIRMATION: NORMAL
PLATELET, FLUORESCENCE: 64 K/UL (ref 130–450)
PMV BLD AUTO: ABNORMAL FL (ref 7–12)
PROTHROMBIN TIME: 12.9 SEC (ref 9.3–12.4)
RBC # BLD AUTO: 3.2 M/UL (ref 3.8–5.8)
WBC OTHER # BLD: 20.3 K/UL (ref 4.5–11.5)

## 2023-10-27 PROCEDURE — 36415 COLL VENOUS BLD VENIPUNCTURE: CPT

## 2023-10-27 PROCEDURE — 85610 PROTHROMBIN TIME: CPT

## 2023-10-27 PROCEDURE — 7100000011 HC PHASE II RECOVERY - ADDTL 15 MIN

## 2023-10-27 PROCEDURE — 85027 COMPLETE CBC AUTOMATED: CPT

## 2023-10-27 PROCEDURE — 7100000010 HC PHASE II RECOVERY - FIRST 15 MIN

## 2023-10-27 PROCEDURE — 2709999900 IR BIOPSY SUPERFICIAL BONE

## 2023-10-27 PROCEDURE — 38222 DX BONE MARROW BX & ASPIR: CPT

## 2023-10-27 PROCEDURE — 20220 BONE BIOPSY TROCAR/NDL SUPFC: CPT

## 2023-10-27 PROCEDURE — 77002 NEEDLE LOCALIZATION BY XRAY: CPT

## 2023-10-27 PROCEDURE — 6360000002 HC RX W HCPCS: Performed by: RADIOLOGY

## 2023-10-27 RX ORDER — MIDAZOLAM HYDROCHLORIDE 2 MG/2ML
INJECTION, SOLUTION INTRAMUSCULAR; INTRAVENOUS PRN
Status: COMPLETED | OUTPATIENT
Start: 2023-10-27 | End: 2023-10-27

## 2023-10-27 RX ORDER — FENTANYL CITRATE 50 UG/ML
INJECTION, SOLUTION INTRAMUSCULAR; INTRAVENOUS PRN
Status: COMPLETED | OUTPATIENT
Start: 2023-10-27 | End: 2023-10-27

## 2023-10-27 RX ADMIN — FENTANYL CITRATE 25 MCG: 50 INJECTION, SOLUTION INTRAMUSCULAR; INTRAVENOUS at 13:44

## 2023-10-27 RX ADMIN — MIDAZOLAM HYDROCHLORIDE 0.5 MG: 1 INJECTION, SOLUTION INTRAMUSCULAR; INTRAVENOUS at 13:43

## 2023-10-27 NOTE — PROCEDURES
1408Patient returned from bone marrow biopsy. Dressing checked, clean, dry, and intact. Patient stable. No s/s of complications noted or reported. Vitals will be checked q 15min, see flow sheets. 1430Patient eating and drinking well with no s/s of complications noted or reported. 1440 Site was checked with every set of vitals. Site clean dry and intact. Discharge papers reviewed with patient, questions answered, discharge paper signed. Patient taken to door. Patient in stable condition, no s/s of complications noted or reported.

## 2023-10-30 NOTE — PROGRESS NOTES
Patient's wife called to cancel tomorrow's chf clinic appointment and does not wish to reschedule at this time.

## 2023-11-01 ENCOUNTER — HOSPITAL ENCOUNTER (OUTPATIENT)
Dept: OTHER | Age: 88
Discharge: HOME | End: 2023-11-01

## 2023-11-07 DIAGNOSIS — D46.9 MDS (MYELODYSPLASTIC SYNDROME) (HCC): Primary | ICD-10-CM

## 2023-11-07 DIAGNOSIS — D46.9 MDS (MYELODYSPLASTIC SYNDROME) (HCC): ICD-10-CM

## 2023-11-07 RX ORDER — SODIUM CHLORIDE 9 MG/ML
INJECTION, SOLUTION INTRAVENOUS CONTINUOUS
Status: CANCELLED | OUTPATIENT
Start: 2023-11-08

## 2023-11-07 RX ORDER — ACETAMINOPHEN 325 MG/1
650 TABLET ORAL
Status: CANCELLED | OUTPATIENT
Start: 2023-11-08

## 2023-11-07 RX ORDER — ONDANSETRON 2 MG/ML
8 INJECTION INTRAMUSCULAR; INTRAVENOUS
Status: CANCELLED | OUTPATIENT
Start: 2023-11-08

## 2023-11-07 RX ORDER — FAMOTIDINE 10 MG/ML
20 INJECTION, SOLUTION INTRAVENOUS
Status: CANCELLED | OUTPATIENT
Start: 2023-11-08

## 2023-11-07 RX ORDER — EPINEPHRINE 1 MG/ML
0.3 INJECTION, SOLUTION, CONCENTRATE INTRAVENOUS PRN
Status: CANCELLED | OUTPATIENT
Start: 2023-11-08

## 2023-11-07 RX ORDER — SODIUM CHLORIDE 9 MG/ML
20 INJECTION, SOLUTION INTRAVENOUS CONTINUOUS
Status: CANCELLED | OUTPATIENT
Start: 2023-11-08

## 2023-11-07 RX ORDER — SODIUM CHLORIDE 9 MG/ML
25 INJECTION, SOLUTION INTRAVENOUS PRN
Status: CANCELLED | OUTPATIENT
Start: 2023-11-08

## 2023-11-07 RX ORDER — DIPHENHYDRAMINE HYDROCHLORIDE 50 MG/ML
50 INJECTION INTRAMUSCULAR; INTRAVENOUS
Status: CANCELLED | OUTPATIENT
Start: 2023-11-08

## 2023-11-07 RX ORDER — ALBUTEROL SULFATE 90 UG/1
4 AEROSOL, METERED RESPIRATORY (INHALATION) PRN
Status: CANCELLED | OUTPATIENT
Start: 2023-11-08

## 2023-11-07 RX ORDER — SODIUM CHLORIDE 0.9 % (FLUSH) 0.9 %
5-40 SYRINGE (ML) INJECTION PRN
Status: CANCELLED | OUTPATIENT
Start: 2023-11-08

## 2023-11-07 RX ORDER — ACETAMINOPHEN 325 MG/1
650 TABLET ORAL ONCE
Status: CANCELLED | OUTPATIENT
Start: 2023-11-08 | End: 2023-11-08

## 2023-11-07 NOTE — PROGRESS NOTES
Pt was scheduled for 1 unit of blood for 11/8, no current order in Epic for blood transfusion. Attempted reach [facility] [place] to place order multiple times with no answer. Call placed to pt's wife at this time, notified to call [facility] [place] first thing on 11/8 to reschedule. Pt's wife voiced understanding.

## 2023-11-08 ENCOUNTER — HOSPITAL ENCOUNTER (OUTPATIENT)
Dept: INFUSION THERAPY | Age: 88
Discharge: HOME | End: 2023-11-08

## 2023-11-08 DIAGNOSIS — D46.9 MDS (MYELODYSPLASTIC SYNDROME) (HCC): Primary | ICD-10-CM

## 2023-11-08 RX ORDER — ALBUTEROL SULFATE 90 UG/1
4 AEROSOL, METERED RESPIRATORY (INHALATION) PRN
OUTPATIENT
Start: 2023-11-10

## 2023-11-08 RX ORDER — ONDANSETRON 2 MG/ML
8 INJECTION INTRAMUSCULAR; INTRAVENOUS
OUTPATIENT
Start: 2023-11-10

## 2023-11-08 RX ORDER — FAMOTIDINE 10 MG/ML
20 INJECTION, SOLUTION INTRAVENOUS
OUTPATIENT
Start: 2023-11-10

## 2023-11-08 RX ORDER — SODIUM CHLORIDE 9 MG/ML
INJECTION, SOLUTION INTRAVENOUS CONTINUOUS
OUTPATIENT
Start: 2023-11-10

## 2023-11-08 RX ORDER — ACETAMINOPHEN 325 MG/1
650 TABLET ORAL
OUTPATIENT
Start: 2023-11-10

## 2023-11-08 RX ORDER — SODIUM CHLORIDE 9 MG/ML
20 INJECTION, SOLUTION INTRAVENOUS CONTINUOUS
Status: CANCELLED | OUTPATIENT
Start: 2023-11-10

## 2023-11-08 RX ORDER — ACETAMINOPHEN 325 MG/1
650 TABLET ORAL ONCE
Status: CANCELLED | OUTPATIENT
Start: 2023-11-10 | End: 2023-11-10

## 2023-11-08 RX ORDER — EPINEPHRINE 1 MG/ML
0.3 INJECTION, SOLUTION, CONCENTRATE INTRAVENOUS PRN
OUTPATIENT
Start: 2023-11-10

## 2023-11-08 RX ORDER — SODIUM CHLORIDE 9 MG/ML
25 INJECTION, SOLUTION INTRAVENOUS PRN
Status: CANCELLED | OUTPATIENT
Start: 2023-11-10

## 2023-11-08 RX ORDER — SODIUM CHLORIDE 0.9 % (FLUSH) 0.9 %
5-40 SYRINGE (ML) INJECTION PRN
Status: CANCELLED | OUTPATIENT
Start: 2023-11-10

## 2023-11-08 RX ORDER — DIPHENHYDRAMINE HYDROCHLORIDE 50 MG/ML
50 INJECTION INTRAMUSCULAR; INTRAVENOUS
OUTPATIENT
Start: 2023-11-10

## 2023-11-09 ENCOUNTER — OFFICE VISIT (OUTPATIENT)
Dept: CARDIOLOGY CLINIC | Age: 88
End: 2023-11-09

## 2023-11-09 VITALS
HEIGHT: 66 IN | DIASTOLIC BLOOD PRESSURE: 58 MMHG | RESPIRATION RATE: 16 BRPM | WEIGHT: 143 LBS | HEART RATE: 79 BPM | SYSTOLIC BLOOD PRESSURE: 112 MMHG | BODY MASS INDEX: 22.98 KG/M2

## 2023-11-09 DIAGNOSIS — I10 ESSENTIAL HYPERTENSION: ICD-10-CM

## 2023-11-09 DIAGNOSIS — N18.31 STAGE 3A CHRONIC KIDNEY DISEASE (CKD) (HCC): ICD-10-CM

## 2023-11-09 DIAGNOSIS — I34.0 NONRHEUMATIC MITRAL VALVE REGURGITATION: ICD-10-CM

## 2023-11-09 DIAGNOSIS — I25.10 CORONARY ARTERY DISEASE INVOLVING NATIVE CORONARY ARTERY OF NATIVE HEART WITHOUT ANGINA PECTORIS: Primary | ICD-10-CM

## 2023-11-09 DIAGNOSIS — I50.33 ACUTE ON CHRONIC HEART FAILURE WITH PRESERVED EJECTION FRACTION (HFPEF) (HCC): ICD-10-CM

## 2023-11-09 DIAGNOSIS — I50.32 CHRONIC HEART FAILURE WITH PRESERVED EJECTION FRACTION (HFPEF) (HCC): ICD-10-CM

## 2023-11-09 DIAGNOSIS — I25.10 CORONARY ARTERY DISEASE INVOLVING NATIVE CORONARY ARTERY OF NATIVE HEART WITHOUT ANGINA PECTORIS: ICD-10-CM

## 2023-11-09 DIAGNOSIS — N18.9 CHRONIC KIDNEY DISEASE, UNSPECIFIED CKD STAGE: ICD-10-CM

## 2023-11-09 LAB
ANION GAP SERPL CALCULATED.3IONS-SCNC: 11 MMOL/L (ref 7–16)
BUN BLDV-MCNC: 27 MG/DL (ref 6–23)
CALCIUM SERPL-MCNC: 8.1 MG/DL (ref 8.6–10.2)
CHLORIDE BLD-SCNC: 103 MMOL/L (ref 98–107)
CO2: 21 MMOL/L (ref 22–29)
CREAT SERPL-MCNC: 1.8 MG/DL (ref 0.7–1.2)
GFR SERPL CREATININE-BSD FRML MDRD: 37 ML/MIN/1.73M2
GLUCOSE BLD-MCNC: 90 MG/DL (ref 74–99)
POTASSIUM SERPL-SCNC: 4.9 MMOL/L (ref 3.5–5)
SODIUM BLD-SCNC: 135 MMOL/L (ref 132–146)

## 2023-11-09 RX ORDER — DIPHENHYDRAMINE HYDROCHLORIDE 50 MG/ML
50 INJECTION INTRAMUSCULAR; INTRAVENOUS
OUTPATIENT
Start: 2023-11-09

## 2023-11-09 RX ORDER — SODIUM CHLORIDE 9 MG/ML
20 INJECTION, SOLUTION INTRAVENOUS CONTINUOUS
Status: CANCELLED | OUTPATIENT
Start: 2023-11-09

## 2023-11-09 RX ORDER — ONDANSETRON 2 MG/ML
8 INJECTION INTRAMUSCULAR; INTRAVENOUS
OUTPATIENT
Start: 2023-11-09

## 2023-11-09 RX ORDER — ACETAMINOPHEN 325 MG/1
650 TABLET ORAL
OUTPATIENT
Start: 2023-11-09

## 2023-11-09 RX ORDER — EPINEPHRINE 1 MG/ML
0.3 INJECTION, SOLUTION, CONCENTRATE INTRAVENOUS PRN
OUTPATIENT
Start: 2023-11-09

## 2023-11-09 RX ORDER — ALBUTEROL SULFATE 90 UG/1
4 AEROSOL, METERED RESPIRATORY (INHALATION) PRN
OUTPATIENT
Start: 2023-11-09

## 2023-11-09 RX ORDER — SODIUM CHLORIDE 0.9 % (FLUSH) 0.9 %
5-40 SYRINGE (ML) INJECTION PRN
Status: CANCELLED | OUTPATIENT
Start: 2023-11-09

## 2023-11-09 RX ORDER — FUROSEMIDE 40 MG/1
20 TABLET ORAL DAILY
Qty: 30 TABLET | Refills: 6 | Status: SHIPPED
Start: 2023-11-09

## 2023-11-09 RX ORDER — SODIUM CHLORIDE 9 MG/ML
25 INJECTION, SOLUTION INTRAVENOUS PRN
Status: CANCELLED | OUTPATIENT
Start: 2023-11-09

## 2023-11-09 RX ORDER — MIRTAZAPINE 15 MG/1
15 TABLET, FILM COATED ORAL NIGHTLY
COMMUNITY
Start: 2023-10-24

## 2023-11-09 RX ORDER — ACETAMINOPHEN 325 MG/1
650 TABLET ORAL ONCE
Status: CANCELLED | OUTPATIENT
Start: 2023-11-09 | End: 2023-11-09

## 2023-11-09 RX ORDER — SODIUM CHLORIDE 9 MG/ML
INJECTION, SOLUTION INTRAVENOUS CONTINUOUS
OUTPATIENT
Start: 2023-11-09

## 2023-11-09 NOTE — PROGRESS NOTES
OFFICE VISIT     PRIMARY CARE PHYSICIAN:      Brian Butterfield DO       ALLERGIES / SENSITIVITIES:        Allergies   Allergen Reactions    Iodine Rash     IV Iodine    Benadryl [Diphenhydramine]      IV BENADRYL     Ramipril      cough          REVIEWED MEDICATIONS:        Current Outpatient Medications:     mirtazapine (REMERON) 15 MG tablet, Take 1 tablet by mouth nightly, Disp: , Rfl:     hydroxyurea (HYDREA) 500 MG chemo capsule, Take by mouth daily, Disp: , Rfl:     zolpidem (AMBIEN) 5 MG tablet, Take 1 tablet by mouth nightly as needed for Sleep for up to 90 days. Max Daily Amount: 5 mg, Disp: 20 tablet, Rfl: 3    furosemide (LASIX) 40 MG tablet, Take 1 tablet by mouth daily, Disp: 30 tablet, Rfl: 6    allopurinol (ZYLOPRIM) 300 MG tablet, TAKE ONE TABLET BY MOUTH EVERY DAY, Disp: 90 tablet, Rfl: 0    albuterol sulfate HFA (VENTOLIN HFA) 108 (90 Base) MCG/ACT inhaler, Inhale 2 puffs into the lungs 4 times daily as needed for Wheezing, Disp: 18 g, Rfl: 0    LORazepam (ATIVAN) 1 MG tablet, Take 1 tablet by mouth daily as needed for Anxiety (For anxiety and insomnia) for up to 360 days. , Disp: 15 tablet, Rfl: 4    Handicap Placard MISC, by Does not apply route Dx - Gait instability Duration - 8/27, Disp: 1 each, Rfl: 0    alirocumab (PRALUENT) 75 MG/ML SOAJ injection pen, Inject 1 mL into the skin every 14 days, Disp: 2.24 mL, Rfl: 6    isosorbide mononitrate (IMDUR) 30 MG extended release tablet, TAKE ONE TABLET BY MOUTH DAILY, Disp: 90 tablet, Rfl: 3    carvedilol (COREG) 3.125 MG tablet, TAKE ONE TABLET BY MOUTH TWO TIMES A DAY, Disp: 180 tablet, Rfl: 0    vitamin D (ERGOCALCIFEROL) 1.25 MG (83667 UT) CAPS capsule, Take 1 capsule by mouth every 30 days, Disp: , Rfl:     sodium bicarbonate 650 MG tablet, Take 1 tablet by mouth 2 times daily, Disp: , Rfl:     tamsulosin (FLOMAX) 0.4 MG capsule, Take 1 capsule by mouth daily, Disp: , Rfl:     ezetimibe (ZETIA) 10 MG tablet, Take 1 tablet by mouth daily, Disp:

## 2023-11-10 ENCOUNTER — HOSPITAL ENCOUNTER (OUTPATIENT)
Dept: INFUSION THERAPY | Age: 88
Discharge: HOME | End: 2023-11-10
Payer: MEDICARE

## 2023-11-10 VITALS
HEART RATE: 70 BPM | OXYGEN SATURATION: 100 % | TEMPERATURE: 97 F | RESPIRATION RATE: 16 BRPM | SYSTOLIC BLOOD PRESSURE: 142 MMHG | DIASTOLIC BLOOD PRESSURE: 68 MMHG

## 2023-11-10 DIAGNOSIS — D46.9 MDS (MYELODYSPLASTIC SYNDROME) (HCC): Primary | ICD-10-CM

## 2023-11-10 PROCEDURE — 6360000002 HC RX W HCPCS: Performed by: NURSE PRACTITIONER

## 2023-11-10 PROCEDURE — 36430 TRANSFUSION BLD/BLD COMPNT: CPT

## 2023-11-10 PROCEDURE — P9016 RBC LEUKOCYTES REDUCED: HCPCS

## 2023-11-10 PROCEDURE — 96374 THER/PROPH/DIAG INJ IV PUSH: CPT

## 2023-11-10 PROCEDURE — 2580000003 HC RX 258: Performed by: NURSE PRACTITIONER

## 2023-11-10 PROCEDURE — 6370000000 HC RX 637 (ALT 250 FOR IP): Performed by: NURSE PRACTITIONER

## 2023-11-10 RX ORDER — SODIUM CHLORIDE 9 MG/ML
25 INJECTION, SOLUTION INTRAVENOUS PRN
Status: DISCONTINUED | OUTPATIENT
Start: 2023-11-10 | End: 2023-11-11 | Stop reason: HOSPADM

## 2023-11-10 RX ORDER — SODIUM CHLORIDE 9 MG/ML
20 INJECTION, SOLUTION INTRAVENOUS CONTINUOUS
Status: DISCONTINUED | OUTPATIENT
Start: 2023-11-10 | End: 2023-11-11 | Stop reason: HOSPADM

## 2023-11-10 RX ORDER — SODIUM CHLORIDE 0.9 % (FLUSH) 0.9 %
5-40 SYRINGE (ML) INJECTION PRN
Status: DISCONTINUED | OUTPATIENT
Start: 2023-11-10 | End: 2023-11-11 | Stop reason: HOSPADM

## 2023-11-10 RX ORDER — ACETAMINOPHEN 325 MG/1
650 TABLET ORAL ONCE
Status: COMPLETED | OUTPATIENT
Start: 2023-11-10 | End: 2023-11-10

## 2023-11-10 RX ADMIN — HYDROCORTISONE SODIUM SUCCINATE 25 MG: 100 INJECTION, POWDER, FOR SOLUTION INTRAMUSCULAR; INTRAVENOUS at 08:50

## 2023-11-10 RX ADMIN — ACETAMINOPHEN 650 MG: 325 TABLET, FILM COATED ORAL at 08:49

## 2023-11-10 RX ADMIN — SODIUM CHLORIDE 20 ML/HR: 9 INJECTION, SOLUTION INTRAVENOUS at 08:52

## 2023-11-17 LAB — BONE MARROW REPORT: NORMAL

## 2023-12-13 ENCOUNTER — TELEPHONE (OUTPATIENT)
Dept: PRIMARY CARE CLINIC | Age: 88
End: 2023-12-13

## 2023-12-13 DIAGNOSIS — H91.90 DIMINISHED HEARING, UNSPECIFIED LATERALITY: Primary | ICD-10-CM

## 2024-01-05 ENCOUNTER — OFFICE VISIT (OUTPATIENT)
Dept: CARDIOLOGY CLINIC | Age: 89
End: 2024-01-05

## 2024-01-05 VITALS
RESPIRATION RATE: 16 BRPM | WEIGHT: 152.4 LBS | HEIGHT: 66 IN | OXYGEN SATURATION: 98 % | BODY MASS INDEX: 24.49 KG/M2 | HEART RATE: 76 BPM | DIASTOLIC BLOOD PRESSURE: 58 MMHG | SYSTOLIC BLOOD PRESSURE: 110 MMHG

## 2024-01-05 DIAGNOSIS — I50.32 CHRONIC HEART FAILURE WITH PRESERVED EJECTION FRACTION (HFPEF) (HCC): Primary | ICD-10-CM

## 2024-01-05 DIAGNOSIS — I25.10 CORONARY ARTERY DISEASE INVOLVING NATIVE CORONARY ARTERY OF NATIVE HEART WITHOUT ANGINA PECTORIS: ICD-10-CM

## 2024-01-05 DIAGNOSIS — E78.5 DYSLIPIDEMIA: ICD-10-CM

## 2024-01-05 DIAGNOSIS — I34.0 NONRHEUMATIC MITRAL VALVE REGURGITATION: ICD-10-CM

## 2024-01-05 DIAGNOSIS — G47.00 INSOMNIA, UNSPECIFIED TYPE: ICD-10-CM

## 2024-01-05 PROBLEM — I50.22 CHRONIC SYSTOLIC (CONGESTIVE) HEART FAILURE (HCC): Status: ACTIVE | Noted: 2024-01-05

## 2024-01-05 RX ORDER — ZOLPIDEM TARTRATE 5 MG/1
5 TABLET ORAL NIGHTLY PRN
Qty: 20 TABLET | Refills: 3 | Status: SHIPPED | OUTPATIENT
Start: 2024-01-05 | End: 2024-04-04

## 2024-01-05 NOTE — PROGRESS NOTES
OFFICE VISIT     PRIMARY CARE PHYSICIAN:      Wilbert Arcos DO       ALLERGIES / SENSITIVITIES:        Allergies   Allergen Reactions    Iodine Rash     IV Iodine    Benadryl [Diphenhydramine]      IV BENADRYL     Ramipril      cough          REVIEWED MEDICATIONS:        Current Outpatient Medications:     zolpidem (AMBIEN) 5 MG tablet, Take 1 tablet by mouth nightly as needed for Sleep for up to 90 days. Max Daily Amount: 5 mg, Disp: 20 tablet, Rfl: 3    alirocumab (PRALUENT) 75 MG/ML SOAJ injection pen, Inject 1 mL into the skin every 14 days, Disp: 2.24 mL, Rfl: 9    mirtazapine (REMERON) 15 MG tablet, Take 1 tablet by mouth nightly, Disp: , Rfl:     furosemide (LASIX) 40 MG tablet, Take 0.5 tablets by mouth daily, Disp: 30 tablet, Rfl: 6    hydroxyurea (HYDREA) 500 MG chemo capsule, Take by mouth daily, Disp: , Rfl:     allopurinol (ZYLOPRIM) 300 MG tablet, TAKE ONE TABLET BY MOUTH EVERY DAY, Disp: 90 tablet, Rfl: 0    isosorbide mononitrate (IMDUR) 30 MG extended release tablet, TAKE ONE TABLET BY MOUTH DAILY, Disp: 90 tablet, Rfl: 3    carvedilol (COREG) 3.125 MG tablet, TAKE ONE TABLET BY MOUTH TWO TIMES A DAY, Disp: 180 tablet, Rfl: 0    vitamin D (ERGOCALCIFEROL) 1.25 MG (30580 UT) CAPS capsule, Take 1 capsule by mouth every 30 days, Disp: , Rfl:     sodium bicarbonate 650 MG tablet, Take 1 tablet by mouth 2 times daily, Disp: , Rfl:     tamsulosin (FLOMAX) 0.4 MG capsule, Take 1 capsule by mouth daily, Disp: , Rfl:     ezetimibe (ZETIA) 10 MG tablet, Take 1 tablet by mouth daily, Disp: 30 tablet, Rfl: 6    metFORMIN (GLUCOPHAGE) 1000 MG tablet, Take 1 tablet by mouth 2 times daily (with meals), Disp: 180 tablet, Rfl: 3    nateglinide (STARLIX) 120 MG tablet, TAKE ONE TABLET BY MOUTH THREE TIMES A DAY BEFORE MEALS, Disp: 270 tablet, Rfl: 1    atorvastatin (LIPITOR) 80 MG tablet, Take 1 tablet by mouth daily, Disp: , Rfl:     docusate sodium (COLACE) 100 MG capsule, Take 1 capsule by mouth daily as

## 2024-01-09 LAB
ALBUMIN SERPL-MCNC: 4.5 G/DL (ref 3.5–5.2)
ALP SERPL-CCNC: 48 U/L (ref 40–129)
ALT SERPL-CCNC: 9 U/L (ref 0–40)
ANION GAP SERPL CALCULATED.3IONS-SCNC: 11 MMOL/L (ref 7–16)
AST SERPL-CCNC: 19 U/L (ref 0–39)
BILIRUB SERPL-MCNC: 0.4 MG/DL (ref 0–1.2)
BUN SERPL-MCNC: 26 MG/DL (ref 6–23)
CALCIUM SERPL-MCNC: 9.4 MG/DL (ref 8.6–10.2)
CHLORIDE SERPL-SCNC: 98 MMOL/L (ref 98–107)
CO2 SERPL-SCNC: 26 MMOL/L (ref 22–29)
CREAT SERPL-MCNC: 1.3 MG/DL (ref 0.7–1.2)
GFR SERPL CREATININE-BSD FRML MDRD: 53 ML/MIN/1.73M2
GLUCOSE SERPL-MCNC: 87 MG/DL (ref 74–99)
POTASSIUM SERPL-SCNC: 4.9 MMOL/L (ref 3.5–5)
PROT SERPL-MCNC: 9.1 G/DL (ref 6.4–8.3)
SODIUM SERPL-SCNC: 135 MMOL/L (ref 132–146)

## 2024-02-22 ENCOUNTER — OFFICE VISIT (OUTPATIENT)
Dept: PRIMARY CARE CLINIC | Age: 89
End: 2024-02-22

## 2024-02-22 VITALS
BODY MASS INDEX: 25.02 KG/M2 | TEMPERATURE: 97.9 F | SYSTOLIC BLOOD PRESSURE: 116 MMHG | DIASTOLIC BLOOD PRESSURE: 60 MMHG | HEART RATE: 86 BPM | WEIGHT: 155 LBS | OXYGEN SATURATION: 98 %

## 2024-02-22 DIAGNOSIS — E11.51 TYPE II DIABETES MELLITUS WITH PERIPHERAL CIRCULATORY DISORDER (HCC): ICD-10-CM

## 2024-02-22 DIAGNOSIS — N18.31 STAGE 3A CHRONIC KIDNEY DISEASE (CKD) (HCC): Primary | ICD-10-CM

## 2024-02-22 DIAGNOSIS — Z13.220 SCREENING CHOLESTEROL LEVEL: ICD-10-CM

## 2024-02-22 DIAGNOSIS — D46.20 REFRACTORY ANEMIA WITH EXCESS OF BLASTS, UNSPECIFIED (HCC): ICD-10-CM

## 2024-02-22 PROBLEM — U07.1 ACUTE HYPOXEMIC RESPIRATORY FAILURE DUE TO COVID-19 (HCC): Status: RESOLVED | Noted: 2023-05-30 | Resolved: 2024-02-22

## 2024-02-22 PROBLEM — J96.01 ACUTE HYPOXEMIC RESPIRATORY FAILURE DUE TO COVID-19 (HCC): Status: RESOLVED | Noted: 2023-05-30 | Resolved: 2024-02-22

## 2024-02-22 PROBLEM — J96.01 ACUTE RESPIRATORY FAILURE WITH HYPOXIA (HCC): Status: RESOLVED | Noted: 2023-05-31 | Resolved: 2024-02-22

## 2024-02-22 ASSESSMENT — PATIENT HEALTH QUESTIONNAIRE - PHQ9
1. LITTLE INTEREST OR PLEASURE IN DOING THINGS: 0
SUM OF ALL RESPONSES TO PHQ9 QUESTIONS 1 & 2: 0
2. FEELING DOWN, DEPRESSED OR HOPELESS: 0
SUM OF ALL RESPONSES TO PHQ QUESTIONS 1-9: 0

## 2024-02-22 NOTE — PROGRESS NOTES
2/22/24    Frederick Santiago, a male of 88 y.o. presents today for 6 Month Follow-Up    He has been following with cardiology. He has followed with oncology for myleoid leukemia.         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   Respiratory: Negative for choking, chest tightness, shortness of breath and wheezing.  Cardiovascular: Negative for chest pain, palpitations and leg swelling.   Gastrointestinal: Negative for abdominal distention, constipation, diarrhea, nausea and vomiting.   Musculoskeletal: Negative for arthralgias, back pain, gait problem and joint swelling.   Neurological: Negative for dizziness, weakness,numbness and headaches.     Patient Active Problem List    Diagnosis Date Noted    Stage 3a chronic kidney disease (CKD) ([facility]) 02/20/2023    Influenzal pneumonia 12/03/2022    Chronic systolic (congestive) heart failure 01/05/2024    MDS (myelodysplastic syndrome) ([facility]) 10/24/2023    Leukocytosis 06/01/2023    Acidosis 06/01/2023    COVID-19 virus infection 06/01/2023    Uncontrolled type 2 diabetes mellitus with hyperglycemia ([facility]) 06/01/2023    Syncope and collapse 05/30/2023    Renal tubular acidosis 05/30/2023    Hyperlipidemia, mixed 02/04/2019    Acute pancreatitis 03/03/2018    Chest pain 03/02/2018    Lower GI

## 2024-03-05 LAB
25(OH)D3 SERPL-MCNC: 33.5 NG/ML (ref 30–100)
ALBUMIN SERPL-MCNC: 4.1 G/DL (ref 3.5–5.2)
ALP SERPL-CCNC: 41 U/L (ref 40–129)
ALT SERPL-CCNC: 12 U/L (ref 0–40)
ANION GAP SERPL CALCULATED.3IONS-SCNC: 14 MMOL/L (ref 7–16)
AST SERPL-CCNC: 20 U/L (ref 0–39)
BILIRUB SERPL-MCNC: 0.4 MG/DL (ref 0–1.2)
BUN SERPL-MCNC: 22 MG/DL (ref 6–23)
CALCIUM SERPL-MCNC: 9.4 MG/DL (ref 8.6–10.2)
CHLORIDE SERPL-SCNC: 103 MMOL/L (ref 98–107)
CHOLESTEROL: 83 MG/DL
CO2 SERPL-SCNC: 21 MMOL/L (ref 22–29)
CREAT SERPL-MCNC: 1.3 MG/DL (ref 0.7–1.2)
ERYTHROCYTE [DISTWIDTH] IN BLOOD BY AUTOMATED COUNT: 14.9 % (ref 11.5–15)
GFR SERPL CREATININE-BSD FRML MDRD: 55 ML/MIN/1.73M2
GLUCOSE SERPL-MCNC: 98 MG/DL (ref 74–99)
HCT VFR BLD AUTO: 33.4 % (ref 37–54)
HDLC SERPL-MCNC: 40 MG/DL
HGB BLD-MCNC: 10.2 G/DL (ref 12.5–16.5)
LDL CHOLESTEROL: 33 MG/DL
MCH RBC QN AUTO: 31.1 PG (ref 26–35)
MCHC RBC AUTO-ENTMCNC: 30.5 G/DL (ref 32–34.5)
MCV RBC AUTO: 101.8 FL (ref 80–99.9)
PHOSPHATE SERPL-MCNC: 4.7 MG/DL (ref 2.5–4.5)
PLATELET # BLD AUTO: 201 K/UL (ref 130–450)
PMV BLD AUTO: 12 FL (ref 7–12)
POTASSIUM SERPL-SCNC: 5 MMOL/L (ref 3.5–5)
PROT SERPL-MCNC: 8.5 G/DL (ref 6.4–8.3)
PTH-INTACT SERPL-MCNC: 25.9 PG/ML (ref 15–65)
RBC # BLD AUTO: 3.28 M/UL (ref 3.8–5.8)
SODIUM SERPL-SCNC: 138 MMOL/L (ref 132–146)
TRIGL SERPL-MCNC: 50 MG/DL
VLDLC SERPL CALC-MCNC: 10 MG/DL
WBC OTHER # BLD: 6.2 K/UL (ref 4.5–11.5)

## 2024-04-23 ENCOUNTER — TELEPHONE (OUTPATIENT)
Dept: PRIMARY CARE CLINIC | Age: 89
End: 2024-04-23

## 2024-04-23 DIAGNOSIS — M48.07 SPINAL STENOSIS, LUMBOSACRAL REGION: Primary | ICD-10-CM

## 2024-05-16 ENCOUNTER — OFFICE VISIT (OUTPATIENT)
Dept: CARDIOLOGY CLINIC | Age: 89
End: 2024-05-16

## 2024-05-16 VITALS
BODY MASS INDEX: 25.39 KG/M2 | HEART RATE: 78 BPM | SYSTOLIC BLOOD PRESSURE: 116 MMHG | WEIGHT: 158 LBS | DIASTOLIC BLOOD PRESSURE: 52 MMHG | HEIGHT: 66 IN | RESPIRATION RATE: 18 BRPM

## 2024-05-16 DIAGNOSIS — N18.31 STAGE 3A CHRONIC KIDNEY DISEASE (CKD) (HCC): ICD-10-CM

## 2024-05-16 DIAGNOSIS — I34.0 NONRHEUMATIC MITRAL VALVE REGURGITATION: ICD-10-CM

## 2024-05-16 DIAGNOSIS — I50.32 CHRONIC HEART FAILURE WITH PRESERVED EJECTION FRACTION (HFPEF) (HCC): Primary | ICD-10-CM

## 2024-05-16 DIAGNOSIS — I10 ESSENTIAL HYPERTENSION: ICD-10-CM

## 2024-05-16 RX ORDER — ZOLPIDEM TARTRATE 5 MG/1
5 TABLET ORAL NIGHTLY PRN
COMMUNITY
Start: 2024-05-07

## 2024-05-16 NOTE — PROGRESS NOTES
gradient 7.5mmHg.   Normal aortic root size.   No evidence of pericardial effusion.   No intra cardiac mass or thrombus.   Compared to prior echo from 9/11/2017, MR is less.        ASSESSMENT / PLAN:    Frederick was seen today for follow-up.    Diagnoses and all orders for this visit:    Chronic heart failure with preserved ejection fraction (HFpEF) ([facility]) -  EF 60%. Compensated. Clinically appear euvolemic. By Echo in July 2022.  Continue diuretics. He monitor salt intake. Off SGLT-2i (Jardiance) due to UTIs.  -     EKG 12 Lead     Coronary artery disease of native coronaries without angina - Continue Aspirin, Coreg, Altace, and Statin + PCSK-9 inhibitor (added due to LDL 81 on maximal tolerable Statin/Zetia). Off Jardiance due to UTI.    -     EKG 12 Lead      - S/p PTCA of Proximal and Mid LAD in Jan 1989 at [facility].   - S/p Repeat PTCA of LAD in April 1989 at [facility].  - S/p Rotational atherectomy and 2.25-mm/16-mm Scimed Ion DOUG to Mid LAD, Lengthy procedure and also PTCA of ostial Diagonal branch through LAD stent - [facility] on 9/22/2011  - S/p 3.0-mm/28-mm Xience Abbott DOUG to proximal RCA.  Also there is 50% Mid RCA, 50% distal RCA, Prox ISA 20% stenosis, Small caliber superior branch of PDA ostial 70%, larger branch of PDA ostial 50% stenosis - [facility] on 10/9/2011.  - Coronary CTA 7/20//2015 due to JANE/Angina equivalent; showed high grade RCA stenosis; Sub-optimal study due to calcified coronaries.  - S/p 3.5-mm x 18-mm Alpine-RX Abbott DOUG to distal RCA.  Also there is Prox LAD 50%; Mild LAD two areas-mid and distal edge in-stent restenosis, 70% OM1 stenosis; calcified coronaries, [facility] on 12/3/2015.        SOBOE (shortness of breath on exertion) - Mild, chronic. Multifactorial (CHF, Anemia, diastolic dysfunction).     Leukocytosis - Follows with Dr Garcia, Hem/Onc - On Chemo     Chronic Anemia - s/p PRBC as needed -  Follows with Dr Radha, Hem/Onc      CHARANJIT/Chronic kidney disease,

## 2024-06-03 ENCOUNTER — HOSPITAL ENCOUNTER (INPATIENT)
Age: 89
LOS: 2 days | Discharge: HOME | DRG: 682 | End: 2024-06-05
Attending: EMERGENCY MEDICINE | Admitting: INTERNAL MEDICINE
Payer: MEDICARE

## 2024-06-03 ENCOUNTER — APPOINTMENT (OUTPATIENT)
Dept: GENERAL RADIOLOGY | Age: 89
DRG: 682 | End: 2024-06-03
Attending: EMERGENCY MEDICINE
Payer: MEDICARE

## 2024-06-03 ENCOUNTER — APPOINTMENT (OUTPATIENT)
Dept: GENERAL RADIOLOGY | Age: 89
DRG: 682 | End: 2024-06-03
Payer: MEDICARE

## 2024-06-03 DIAGNOSIS — R07.9 EXERTIONAL CHEST PAIN: Primary | ICD-10-CM

## 2024-06-03 DIAGNOSIS — I50.22 CHRONIC SYSTOLIC (CONGESTIVE) HEART FAILURE (HCC): ICD-10-CM

## 2024-06-03 DIAGNOSIS — D64.9 SYMPTOMATIC ANEMIA: ICD-10-CM

## 2024-06-03 DIAGNOSIS — C95.00 ACUTE LEUKEMIA NOT HAVING ACHIEVED REMISSION (HCC): ICD-10-CM

## 2024-06-03 DIAGNOSIS — C92.00 ACUTE MYELOID LEUKEMIA NOT HAVING ACHIEVED REMISSION (HCC): ICD-10-CM

## 2024-06-03 PROBLEM — I10 PRIMARY HYPERTENSION: Status: ACTIVE | Noted: 2024-06-03

## 2024-06-03 PROBLEM — N17.9 AKI (ACUTE KIDNEY INJURY) (HCC): Status: ACTIVE | Noted: 2024-06-03

## 2024-06-03 PROBLEM — I50.9 ACUTE DECOMPENSATED HEART FAILURE (HCC): Status: ACTIVE | Noted: 2024-06-03

## 2024-06-03 PROBLEM — I25.5 ISCHEMIC CARDIOMYOPATHY: Status: ACTIVE | Noted: 2024-06-03

## 2024-06-03 LAB
ALBUMIN SERPL-MCNC: 4.4 G/DL (ref 3.5–5.2)
ALP SERPL-CCNC: 40 U/L (ref 40–129)
ALT SERPL-CCNC: 15 U/L (ref 0–40)
ANION GAP SERPL CALCULATED.3IONS-SCNC: 10 MMOL/L (ref 7–16)
AST SERPL-CCNC: 27 U/L (ref 0–39)
B PARAP IS1001 DNA NPH QL NAA+NON-PROBE: NOT DETECTED
B PERT DNA SPEC QL NAA+PROBE: NOT DETECTED
BASOPHILS # BLD: 0.44 K/UL (ref 0–0.2)
BASOPHILS NFR BLD: 1 % (ref 0–2)
BILIRUB SERPL-MCNC: 0.3 MG/DL (ref 0–1.2)
BILIRUB UR QL STRIP: NEGATIVE
BLASTS ABSOLUTE COUNT: 9.22 K/UL
BLASTS: 21 %
BNP SERPL-MCNC: 1264 PG/ML (ref 0–450)
BUN SERPL-MCNC: 22 MG/DL (ref 6–23)
C PNEUM DNA NPH QL NAA+NON-PROBE: NOT DETECTED
CALCIUM SERPL-MCNC: 8.9 MG/DL (ref 8.6–10.2)
CHLORIDE SERPL-SCNC: 105 MMOL/L (ref 98–107)
CLARITY UR: CLEAR
CO2 SERPL-SCNC: 23 MMOL/L (ref 22–29)
COLOR UR: YELLOW
CREAT SERPL-MCNC: 1.9 MG/DL (ref 0.7–1.2)
EKG ATRIAL RATE: 69 BPM
EKG P AXIS: 67 DEGREES
EKG P-R INTERVAL: 162 MS
EKG Q-T INTERVAL: 398 MS
EKG QRS DURATION: 88 MS
EKG QTC CALCULATION (BAZETT): 426 MS
EKG R AXIS: 6 DEGREES
EKG T AXIS: 50 DEGREES
EKG VENTRICULAR RATE: 69 BPM
EOSINOPHIL # BLD: 0.88 K/UL (ref 0.05–0.5)
EOSINOPHILS RELATIVE PERCENT: 2 % (ref 0–6)
ERYTHROCYTE [DISTWIDTH] IN BLOOD BY AUTOMATED COUNT: 18 % (ref 11.5–15)
FLUAV RNA NPH QL NAA+NON-PROBE: NOT DETECTED
FLUBV RNA NPH QL NAA+NON-PROBE: NOT DETECTED
GFR, ESTIMATED: 34 ML/MIN/1.73M2
GLUCOSE BLD-MCNC: 131 MG/DL (ref 74–99)
GLUCOSE BLD-MCNC: 176 MG/DL (ref 74–99)
GLUCOSE SERPL-MCNC: 104 MG/DL (ref 74–99)
GLUCOSE UR STRIP-MCNC: NEGATIVE MG/DL
HADV DNA NPH QL NAA+NON-PROBE: NOT DETECTED
HAPTOGLOB SERPL-MCNC: 92 MG/DL (ref 30–200)
HCOV 229E RNA NPH QL NAA+NON-PROBE: NOT DETECTED
HCOV HKU1 RNA NPH QL NAA+NON-PROBE: NOT DETECTED
HCOV NL63 RNA NPH QL NAA+NON-PROBE: NOT DETECTED
HCOV OC43 RNA NPH QL NAA+NON-PROBE: NOT DETECTED
HCT VFR BLD AUTO: 22.7 % (ref 37–54)
HCT VFR BLD AUTO: 27.7 % (ref 37–54)
HGB BLD-MCNC: 7 G/DL (ref 12.5–16.5)
HGB BLD-MCNC: 8.9 G/DL (ref 12.5–16.5)
HGB UR QL STRIP.AUTO: ABNORMAL
HMPV RNA NPH QL NAA+NON-PROBE: NOT DETECTED
HPIV1 RNA NPH QL NAA+NON-PROBE: NOT DETECTED
HPIV2 RNA NPH QL NAA+NON-PROBE: NOT DETECTED
HPIV3 RNA NPH QL NAA+NON-PROBE: NOT DETECTED
HPIV4 RNA NPH QL NAA+NON-PROBE: NOT DETECTED
INR PPP: 1.4
KETONES UR STRIP-MCNC: NEGATIVE MG/DL
LDH SERPL-CCNC: 559 U/L (ref 135–225)
LEUKOCYTE ESTERASE UR QL STRIP: NEGATIVE
LYMPHOCYTES NFR BLD: 7.46 K/UL (ref 1.5–4)
LYMPHOCYTES RELATIVE PERCENT: 17 % (ref 20–42)
M PNEUMO DNA NPH QL NAA+NON-PROBE: NOT DETECTED
MCH RBC QN AUTO: 31.5 PG (ref 26–35)
MCHC RBC AUTO-ENTMCNC: 30.8 G/DL (ref 32–34.5)
MCV RBC AUTO: 102.3 FL (ref 80–99.9)
METAMYELOCYTES ABSOLUTE COUNT: 3.07 K/UL (ref 0–0.12)
METAMYELOCYTES: 7 % (ref 0–1)
MONOCYTES NFR BLD: 0.44 K/UL (ref 0.1–0.95)
MONOCYTES NFR BLD: 1 % (ref 2–12)
NEUTROPHILS NFR BLD: 51 % (ref 43–80)
NEUTS SEG NFR BLD: 22.39 K/UL (ref 1.8–7.3)
NITRITE UR QL STRIP: NEGATIVE
PH UR STRIP: 6 [PH] (ref 5–9)
PLATELET CONFIRMATION: NORMAL
PLATELET, FLUORESCENCE: 64 K/UL (ref 130–450)
PMV BLD AUTO: 10.6 FL (ref 7–12)
POTASSIUM SERPL-SCNC: 4.2 MMOL/L (ref 3.5–5)
PROT SERPL-MCNC: 8.6 G/DL (ref 6.4–8.3)
PROT UR STRIP-MCNC: NEGATIVE MG/DL
PROTHROMBIN TIME: 15.6 SEC (ref 9.3–12.4)
RBC # BLD AUTO: 2.22 M/UL (ref 3.8–5.8)
RBC # BLD: ABNORMAL 10*6/UL
RBC #/AREA URNS HPF: ABNORMAL /HPF
RSV RNA NPH QL NAA+NON-PROBE: NOT DETECTED
RV+EV RNA NPH QL NAA+NON-PROBE: NOT DETECTED
SARS-COV-2 RDRP RESP QL NAA+PROBE: NOT DETECTED
SARS-COV-2 RNA NPH QL NAA+NON-PROBE: NOT DETECTED
SODIUM SERPL-SCNC: 138 MMOL/L (ref 132–146)
SP GR UR STRIP: <1.005 (ref 1–1.03)
SPECIMEN DESCRIPTION: NORMAL
SPECIMEN DESCRIPTION: NORMAL
TROPONIN I SERPL HS-MCNC: 23 NG/L (ref 0–11)
TROPONIN I SERPL HS-MCNC: 26 NG/L (ref 0–11)
UROBILINOGEN UR STRIP-ACNC: 0.2 EU/DL (ref 0–1)
WBC #/AREA URNS HPF: ABNORMAL /HPF
WBC OTHER # BLD: 43.9 K/UL (ref 4.5–11.5)

## 2024-06-03 PROCEDURE — 2060000000 HC ICU INTERMEDIATE R&B

## 2024-06-03 PROCEDURE — 86900 BLOOD TYPING SEROLOGIC ABO: CPT

## 2024-06-03 PROCEDURE — 84484 ASSAY OF TROPONIN QUANT: CPT

## 2024-06-03 PROCEDURE — 99223 1ST HOSP IP/OBS HIGH 75: CPT | Performed by: INTERNAL MEDICINE

## 2024-06-03 PROCEDURE — P9016 RBC LEUKOCYTES REDUCED: HCPCS

## 2024-06-03 PROCEDURE — 6360000002 HC RX W HCPCS: Performed by: INTERNAL MEDICINE

## 2024-06-03 PROCEDURE — 80053 COMPREHEN METABOLIC PANEL: CPT

## 2024-06-03 PROCEDURE — 1200000000 HC SEMI PRIVATE

## 2024-06-03 PROCEDURE — 71045 X-RAY EXAM CHEST 1 VIEW: CPT

## 2024-06-03 PROCEDURE — 83010 ASSAY OF HAPTOGLOBIN QUANT: CPT

## 2024-06-03 PROCEDURE — 85014 HEMATOCRIT: CPT

## 2024-06-03 PROCEDURE — 6370000000 HC RX 637 (ALT 250 FOR IP): Performed by: INTERNAL MEDICINE

## 2024-06-03 PROCEDURE — 0202U NFCT DS 22 TRGT SARS-COV-2: CPT

## 2024-06-03 PROCEDURE — 81001 URINALYSIS AUTO W/SCOPE: CPT

## 2024-06-03 PROCEDURE — 86901 BLOOD TYPING SEROLOGIC RH(D): CPT

## 2024-06-03 PROCEDURE — 85018 HEMOGLOBIN: CPT

## 2024-06-03 PROCEDURE — 87635 SARS-COV-2 COVID-19 AMP PRB: CPT

## 2024-06-03 PROCEDURE — APPSS60 APP SPLIT SHARED TIME 46-60 MINUTES: Performed by: CLINICAL NURSE SPECIALIST

## 2024-06-03 PROCEDURE — 93005 ELECTROCARDIOGRAM TRACING: CPT | Performed by: EMERGENCY MEDICINE

## 2024-06-03 PROCEDURE — 83615 LACTATE (LD) (LDH) ENZYME: CPT

## 2024-06-03 PROCEDURE — 83880 ASSAY OF NATRIURETIC PEPTIDE: CPT

## 2024-06-03 PROCEDURE — 99285 EMERGENCY DEPT VISIT HI MDM: CPT

## 2024-06-03 PROCEDURE — 85025 COMPLETE CBC W/AUTO DIFF WBC: CPT

## 2024-06-03 PROCEDURE — 87086 URINE CULTURE/COLONY COUNT: CPT

## 2024-06-03 PROCEDURE — 85610 PROTHROMBIN TIME: CPT

## 2024-06-03 PROCEDURE — 82962 GLUCOSE BLOOD TEST: CPT

## 2024-06-03 PROCEDURE — 93010 ELECTROCARDIOGRAM REPORT: CPT | Performed by: INTERNAL MEDICINE

## 2024-06-03 PROCEDURE — 86923 COMPATIBILITY TEST ELECTRIC: CPT

## 2024-06-03 PROCEDURE — 30233N1 TRANSFUSION OF NONAUTOLOGOUS RED BLOOD CELLS INTO PERIPHERAL VEIN, PERCUTANEOUS APPROACH: ICD-10-PCS | Performed by: INTERNAL MEDICINE

## 2024-06-03 PROCEDURE — 86850 RBC ANTIBODY SCREEN: CPT

## 2024-06-03 RX ORDER — ISOSORBIDE MONONITRATE 30 MG/1
30 TABLET, EXTENDED RELEASE ORAL EVERY MORNING
COMMUNITY

## 2024-06-03 RX ORDER — FOLIC ACID 1 MG/1
500 TABLET ORAL DAILY
Status: DISCONTINUED | OUTPATIENT
Start: 2024-06-03 | End: 2024-06-05 | Stop reason: HOSPADM

## 2024-06-03 RX ORDER — ENOXAPARIN SODIUM 100 MG/ML
30 INJECTION SUBCUTANEOUS DAILY
Status: DISCONTINUED | OUTPATIENT
Start: 2024-06-03 | End: 2024-06-05 | Stop reason: HOSPADM

## 2024-06-03 RX ORDER — DOCUSATE SODIUM 100 MG/1
100 CAPSULE, LIQUID FILLED ORAL DAILY PRN
Status: DISCONTINUED | OUTPATIENT
Start: 2024-06-03 | End: 2024-06-05 | Stop reason: HOSPADM

## 2024-06-03 RX ORDER — CARVEDILOL 3.12 MG/1
3.12 TABLET ORAL 2 TIMES DAILY
Status: DISCONTINUED | OUTPATIENT
Start: 2024-06-03 | End: 2024-06-05 | Stop reason: HOSPADM

## 2024-06-03 RX ORDER — ATORVASTATIN CALCIUM 40 MG/1
80 TABLET, FILM COATED ORAL DAILY
Status: DISCONTINUED | OUTPATIENT
Start: 2024-06-03 | End: 2024-06-05 | Stop reason: HOSPADM

## 2024-06-03 RX ORDER — INSULIN LISPRO 100 [IU]/ML
0-4 INJECTION, SOLUTION INTRAVENOUS; SUBCUTANEOUS NIGHTLY
Status: DISCONTINUED | OUTPATIENT
Start: 2024-06-03 | End: 2024-06-05 | Stop reason: HOSPADM

## 2024-06-03 RX ORDER — TAMSULOSIN HYDROCHLORIDE 0.4 MG/1
0.4 CAPSULE ORAL DAILY
Status: DISCONTINUED | OUTPATIENT
Start: 2024-06-03 | End: 2024-06-05 | Stop reason: HOSPADM

## 2024-06-03 RX ORDER — INSULIN LISPRO 100 [IU]/ML
0-4 INJECTION, SOLUTION INTRAVENOUS; SUBCUTANEOUS
Status: DISCONTINUED | OUTPATIENT
Start: 2024-06-03 | End: 2024-06-05 | Stop reason: HOSPADM

## 2024-06-03 RX ORDER — SODIUM CHLORIDE 0.9 % (FLUSH) 0.9 %
5-40 SYRINGE (ML) INJECTION EVERY 12 HOURS SCHEDULED
Status: DISCONTINUED | OUTPATIENT
Start: 2024-06-03 | End: 2024-06-05 | Stop reason: HOSPADM

## 2024-06-03 RX ORDER — ONDANSETRON 2 MG/ML
4 INJECTION INTRAMUSCULAR; INTRAVENOUS EVERY 6 HOURS PRN
Status: DISCONTINUED | OUTPATIENT
Start: 2024-06-03 | End: 2024-06-05 | Stop reason: HOSPADM

## 2024-06-03 RX ORDER — ALIROCUMAB 75 MG/ML
75 INJECTION, SOLUTION SUBCUTANEOUS
COMMUNITY

## 2024-06-03 RX ORDER — SODIUM BICARBONATE 650 MG/1
650 TABLET ORAL 2 TIMES DAILY
Status: DISCONTINUED | OUTPATIENT
Start: 2024-06-03 | End: 2024-06-05 | Stop reason: HOSPADM

## 2024-06-03 RX ORDER — ACETAMINOPHEN 325 MG/1
650 TABLET ORAL EVERY 6 HOURS PRN
Status: DISCONTINUED | OUTPATIENT
Start: 2024-06-03 | End: 2024-06-05 | Stop reason: HOSPADM

## 2024-06-03 RX ORDER — SODIUM CHLORIDE 9 MG/ML
INJECTION, SOLUTION INTRAVENOUS PRN
Status: DISCONTINUED | OUTPATIENT
Start: 2024-06-03 | End: 2024-06-05 | Stop reason: HOSPADM

## 2024-06-03 RX ORDER — CARVEDILOL 3.12 MG/1
3.12 TABLET ORAL 2 TIMES DAILY
COMMUNITY

## 2024-06-03 RX ORDER — EZETIMIBE 10 MG/1
10 TABLET ORAL DAILY
Status: DISCONTINUED | OUTPATIENT
Start: 2024-06-03 | End: 2024-06-05 | Stop reason: HOSPADM

## 2024-06-03 RX ORDER — POLYETHYLENE GLYCOL 3350 17 G/17G
17 POWDER, FOR SOLUTION ORAL DAILY PRN
Status: DISCONTINUED | OUTPATIENT
Start: 2024-06-03 | End: 2024-06-05 | Stop reason: HOSPADM

## 2024-06-03 RX ORDER — DEXTROSE MONOHYDRATE 100 MG/ML
INJECTION, SOLUTION INTRAVENOUS CONTINUOUS PRN
Status: DISCONTINUED | OUTPATIENT
Start: 2024-06-03 | End: 2024-06-05 | Stop reason: HOSPADM

## 2024-06-03 RX ORDER — ALLOPURINOL 300 MG/1
300 TABLET ORAL EVERY MORNING
COMMUNITY

## 2024-06-03 RX ORDER — GLUCAGON 1 MG/ML
1 KIT INJECTION PRN
Status: DISCONTINUED | OUTPATIENT
Start: 2024-06-03 | End: 2024-06-05 | Stop reason: HOSPADM

## 2024-06-03 RX ORDER — ISOSORBIDE MONONITRATE 30 MG/1
30 TABLET, EXTENDED RELEASE ORAL DAILY
Status: DISCONTINUED | OUTPATIENT
Start: 2024-06-03 | End: 2024-06-05 | Stop reason: HOSPADM

## 2024-06-03 RX ORDER — FUROSEMIDE 40 MG/1
20 TABLET ORAL EVERY MORNING
Status: ON HOLD | COMMUNITY
End: 2024-06-05 | Stop reason: HOSPADM

## 2024-06-03 RX ORDER — TRAZODONE HYDROCHLORIDE 50 MG/1
50 TABLET ORAL NIGHTLY
COMMUNITY

## 2024-06-03 RX ORDER — EZETIMIBE 10 MG/1
10 TABLET ORAL EVERY MORNING
COMMUNITY

## 2024-06-03 RX ORDER — ACETAMINOPHEN 650 MG/1
650 SUPPOSITORY RECTAL EVERY 6 HOURS PRN
Status: DISCONTINUED | OUTPATIENT
Start: 2024-06-03 | End: 2024-06-05 | Stop reason: HOSPADM

## 2024-06-03 RX ORDER — ASPIRIN 81 MG/1
81 TABLET ORAL DAILY
Status: DISCONTINUED | OUTPATIENT
Start: 2024-06-03 | End: 2024-06-05 | Stop reason: HOSPADM

## 2024-06-03 RX ORDER — ONDANSETRON 4 MG/1
4 TABLET, ORALLY DISINTEGRATING ORAL EVERY 8 HOURS PRN
Status: DISCONTINUED | OUTPATIENT
Start: 2024-06-03 | End: 2024-06-05 | Stop reason: HOSPADM

## 2024-06-03 RX ORDER — SODIUM CHLORIDE 0.9 % (FLUSH) 0.9 %
5-40 SYRINGE (ML) INJECTION PRN
Status: DISCONTINUED | OUTPATIENT
Start: 2024-06-03 | End: 2024-06-05 | Stop reason: HOSPADM

## 2024-06-03 RX ORDER — NATEGLINIDE 120 MG/1
120 TABLET ORAL
COMMUNITY

## 2024-06-03 RX ORDER — FUROSEMIDE 10 MG/ML
20 INJECTION INTRAMUSCULAR; INTRAVENOUS
Status: COMPLETED | OUTPATIENT
Start: 2024-06-03 | End: 2024-06-03

## 2024-06-03 RX ORDER — PANTOPRAZOLE SODIUM 40 MG/1
40 TABLET, DELAYED RELEASE ORAL
Status: DISCONTINUED | OUTPATIENT
Start: 2024-06-04 | End: 2024-06-05 | Stop reason: HOSPADM

## 2024-06-03 RX ORDER — HYDROXYUREA 500 MG/1
1000 CAPSULE ORAL 2 TIMES DAILY
Status: DISCONTINUED | OUTPATIENT
Start: 2024-06-03 | End: 2024-06-05 | Stop reason: HOSPADM

## 2024-06-03 RX ADMIN — CARVEDILOL 3.12 MG: 3.12 TABLET, FILM COATED ORAL at 21:18

## 2024-06-03 RX ADMIN — SODIUM BICARBONATE 650 MG: 650 TABLET ORAL at 21:17

## 2024-06-03 RX ADMIN — FUROSEMIDE 20 MG: 10 INJECTION, SOLUTION INTRAMUSCULAR; INTRAVENOUS at 16:52

## 2024-06-03 RX ADMIN — HYDROXYUREA 1000 MG: 500 CAPSULE ORAL at 21:18

## 2024-06-03 ASSESSMENT — LIFESTYLE VARIABLES
HOW MANY STANDARD DRINKS CONTAINING ALCOHOL DO YOU HAVE ON A TYPICAL DAY: PATIENT DOES NOT DRINK
HOW OFTEN DO YOU HAVE A DRINK CONTAINING ALCOHOL: NEVER

## 2024-06-03 ASSESSMENT — ENCOUNTER SYMPTOMS: SHORTNESS OF BREATH: 1

## 2024-06-03 NOTE — ED NOTES
Patient's wife states that Dr. Saini told them it was ok for him to eat.  Patient given tray and coffee, however general tray sent to ER was pork and pt is unable to eat pork.  An alternative tray was ordered from dietary.

## 2024-06-03 NOTE — ED PROVIDER NOTES
[facility] EMERGENCY DEPARTMENT  EMERGENCY DEPARTMENT ENCOUNTER        Pt Name: Frederick Santiago  MRN: [id number]  Birthdate 1935  Date of evaluation: 6/3/2024  Provider: Donovan Mascorro DO  PCP: Wilbert Arcos DO  Note Started: 12:32 PM EDT 6/3/24    CHIEF COMPLAINT       Chief Complaint   Patient presents with    Fatigue    Abnormal Lab     Hgb 7.7 on Tuesday, hx transfusions, hx AML       HISTORY OF PRESENT ILLNESS: 1 or more Elements     History from : Patient    Limitations to history : None    Frederick Santiago is a 88 y.o. male who presents for worsening sob over the past few days, chest tightness w going up the stairs and walking. Sx better at rest. Pt has AML on hydroxyurea who follows w  dr turcios. Hgb was low./ patient is a retired cardiologist.    Nursing Notes were all reviewed and agreed with or any disagreements were addressed in the HPI.    REVIEW OF SYSTEMS :      Review of Systems   Respiratory:  Positive for shortness of breath.    Cardiovascular:  Positive for chest pain.       Positives and Pertinent negatives as per HPI.     SURGICAL HISTORY     Past Surgical History:   Procedure Laterality Date    CHOLECYSTECTOMY, LAPAROSCOPIC  04/10/2018    Dr Chi    COLONOSCOPY      CORONARY ANGIOPLASTY  01/16/89    PTCA/PROXIMAL LAD, PTCA MID LAD ([facility])    CORONARY ANGIOPLASTY  04/12/89    REPEAT PTCA TO MID LAD ([facility])    CORONARY ANGIOPLASTY  09/22/11    PTCA/ROTATIONAL ATHERECTOMY & DOUG TO MID LAD, PTCA TO OSTIUM OF DIAGONAL BRANCH THROUGH STENTED SEGMENT IN  LAD.    CORONARY ANGIOPLASTY  10/19/11    PTCA/DOUG TO PROXIMAL RCA (DR. YOUNGER)    CORONARY ANGIOPLASTY WITH STENT PLACEMENT  12/3/15    Dr. Geno Austin 3.0x15 RCA    DIAGNOSTIC CARDIAC CATH LAB PROCEDURE  01/11/89    @[facility]    DIAGNOSTIC CARDIAC CATH LAB PROCEDURE  01/16/89    @[facility]    DIAGNOSTIC CARDIAC CATH LAB PROCEDURE  09/22/11    ENDOSCOPY, COLON, DIAGNOSTIC      IR BIOPSY BONE NEEDLE SUPERFCL

## 2024-06-03 NOTE — ED NOTES
ED to Inpatient Handoff Report    Notified floor that electronic handoff available and patient ready for transport to room 725.    Safety Risks: Risk of falls    Patient in Restraints: no    Constant Observer or Patient : no    Telemetry Monitoring Ordered: Yes          Order to transfer to unit without monitor: NO    Last MEWS: 2 Time completed: 1645    Deterioration Index: 26.92    Vitals:    06/03/24 1101 06/03/24 1503 06/03/24 1637 06/03/24 1645   BP: 133/63 128/67 (!) 140/77 (!) 140/77   Pulse: 82 72 80 80   Resp: 16 19 24 24   Temp: 97.9 °F (36.6 °C)  97.8 °F (36.6 °C) 97.8 °F (36.6 °C)   TempSrc: Oral   Oral   SpO2: 99% 98% 97% 97%   Weight: 68 kg (150 lb)      Height: 1.676 m (5' 6\")          Opportunity for questions and clarification was provided.

## 2024-06-03 NOTE — H&P
(36.6 °C) (Oral)   Resp 16   Ht 1.676 m (5' 6\")   Wt 68 kg (150 lb)   SpO2 100%   BMI 24.21 kg/m²     General:  Appears comfortable. Answers questions appropriately and cooperative with exam  HEENT:  Mucous membranes moist. No erythema, rhinorrhea, or post-nasal drip noted.  Neck:  No carotid bruits.  Heart:  Rhythm regular at rate of 78  Lungs:  CTA.  No wheeze, rales, or rhonchi  Abdomen:  Positive bowel sounds positive.  Soft.  Non-tender. No guarding, rebound or rigidity.  Breast/Rectal/Genitourinary: not pertinent.    Extremities:  Negative for lower extremity edema  Skin:  Warm and dry  Vascular: 2/4 Dorsalis Pedis pulses bilaterally.  Neuro:  Cranial nerves 2-12 grossly intact, no focal weakness or change in sensation noted.  Extraocular muscles intact.  Pupils equal, round, reactive to light.        I agree with the assessment and plan of Leesa Cortez, VIKTORIYA - CNP    Decision to admit    Chest pain  AML  Asaf  Anemia, symptomatic  Htn  Gerd  Bph  Hyperlipidemia  Dm type 2 controlled        Electronically signed by Stefano Kennedy D.O.  Hospitalist  [facility]

## 2024-06-03 NOTE — ED TRIAGE NOTES
FIRST PROVIDER CONTACT ASSESSMENT NOTE       Department of Emergency Medicine                 First Provider Note            6/3/24  11:03 AM EDT    Date of Encounter: No admission date for patient encounter.    Patient Name: Frederick Santiago  : 1935  MRN: [id number]    Chief Complaint: Fatigue and Abnormal Lab (Hgb 7.7 on Tuesday, hx transfusions)      History of Present Illness:   Frederick Santiago is a 88 y.o. male who presents to the ED for Patient has history of AML diagnosed 1 year ago.  Today patient is dyspneic per family.  Yesterday Hgb was 7.7.      Focused Physical Exam:  VS:    ED Triage Vitals   BP Temp Temp src Pulse Resp SpO2 Height Weight   -- -- -- -- -- -- -- --        Physical Ex: Constitutional: Alert and non-toxic.    Medical History:  has a past medical history of Anal fissure, Anemia, Arthritis, CAD (coronary artery disease), Diabetes mellitus (HCC), GERD (gastroesophageal reflux disease), Gout, History of blood transfusion, Hyperkalemia, Hyperlipidemia, Low back pain, Low grade myelodysplastic syndrome lesions (HCC), OA (osteoarthritis), Pain of left lower extremity, and Rectal fissure.  Surgical History:  has a past surgical history that includes Diagnostic Cardiac Cath Lab Procedure (89); Diagnostic Cardiac Cath Lab Procedure (89); Diagnostic Cardiac Cath Lab Procedure (11); Coronary angioplasty (89); Coronary angioplasty (89); Coronary angioplasty (11); Coronary angioplasty (10/19/11); NM Tumor Localization Spect Multi Day (08/15/11); Total knee arthroplasty (10/99); Colonoscopy; Endoscopy, colon, diagnostic; Coronary angioplasty with stent (12/3/15); joint replacement (); lumbar laminectomy (2017); Upper gastrointestinal endoscopy (2017); Cholecystectomy, laparoscopic (04/10/2018); and IR BIOPSY SUPERFICIAL BONE (10/27/2023).  Social History:  reports that he has never smoked. He has never been exposed to tobacco smoke. He has never

## 2024-06-04 ENCOUNTER — APPOINTMENT (OUTPATIENT)
Age: 89
DRG: 682 | End: 2024-06-04
Attending: INTERNAL MEDICINE
Payer: MEDICARE

## 2024-06-04 LAB
ABO/RH: NORMAL
ALBUMIN SERPL-MCNC: 4.3 G/DL (ref 3.5–5.2)
ALP SERPL-CCNC: 41 U/L (ref 40–129)
ALT SERPL-CCNC: 16 U/L (ref 0–40)
ANION GAP SERPL CALCULATED.3IONS-SCNC: 13 MMOL/L (ref 7–16)
ANTIBODY SCREEN: NEGATIVE
ARM BAND NUMBER: NORMAL
AST SERPL-CCNC: 30 U/L (ref 0–39)
BASOPHILS # BLD: 0 K/UL (ref 0–0.2)
BASOPHILS NFR BLD: 0 % (ref 0–2)
BILIRUB SERPL-MCNC: 0.4 MG/DL (ref 0–1.2)
BLASTS ABSOLUTE COUNT: 8.65 K/UL
BLASTS: 19 %
BLOOD BANK BLOOD PRODUCT EXPIRATION DATE: NORMAL
BLOOD BANK DISPENSE STATUS: NORMAL
BLOOD BANK ISBT PRODUCT BLOOD TYPE: 7300
BLOOD BANK PRODUCT CODE: NORMAL
BLOOD BANK SAMPLE EXPIRATION: NORMAL
BLOOD BANK UNIT TYPE AND RH: NORMAL
BPU ID: NORMAL
BUN SERPL-MCNC: 22 MG/DL (ref 6–23)
CALCIUM SERPL-MCNC: 9.2 MG/DL (ref 8.6–10.2)
CHLORIDE SERPL-SCNC: 100 MMOL/L (ref 98–107)
CO2 SERPL-SCNC: 24 MMOL/L (ref 22–29)
COMPONENT: NORMAL
CREAT SERPL-MCNC: 1.6 MG/DL (ref 0.7–1.2)
CROSSMATCH RESULT: NORMAL
ECHO AO ASC DIAM: 3 CM
ECHO AO ASCENDING AORTA INDEX: 1.69 CM/M2
ECHO AV AREA PEAK VELOCITY: 1.8 CM2
ECHO AV AREA VTI: 2 CM2
ECHO AV AREA/BSA PEAK VELOCITY: 1 CM2/M2
ECHO AV AREA/BSA VTI: 1.1 CM2/M2
ECHO AV CUSP MM: 1.3 CM
ECHO AV MEAN GRADIENT: 9 MMHG
ECHO AV MEAN VELOCITY: 1.5 M/S
ECHO AV PEAK GRADIENT: 16 MMHG
ECHO AV PEAK VELOCITY: 2 M/S
ECHO AV VELOCITY RATIO: 0.5
ECHO AV VTI: 38.6 CM
ECHO BSA: 1.78 M2
ECHO LA DIAMETER INDEX: 2.03 CM/M2
ECHO LA DIAMETER: 3.6 CM
ECHO LA VOL A-L A2C: 66 ML (ref 18–58)
ECHO LA VOL A-L A4C: 57 ML (ref 18–58)
ECHO LA VOL MOD A2C: 62 ML (ref 18–58)
ECHO LA VOL MOD A4C: 52 ML (ref 18–58)
ECHO LA VOLUME AREA LENGTH: 62 ML
ECHO LA VOLUME INDEX A-L A2C: 37 ML/M2 (ref 16–34)
ECHO LA VOLUME INDEX A-L A4C: 32 ML/M2 (ref 16–34)
ECHO LA VOLUME INDEX AREA LENGTH: 35 ML/M2 (ref 16–34)
ECHO LA VOLUME INDEX MOD A2C: 35 ML/M2 (ref 16–34)
ECHO LA VOLUME INDEX MOD A4C: 29 ML/M2 (ref 16–34)
ECHO LV EDV A2C: 95 ML
ECHO LV EDV A4C: 85 ML
ECHO LV EDV BP: 91 ML (ref 67–155)
ECHO LV EDV INDEX A4C: 48 ML/M2
ECHO LV EDV INDEX BP: 51 ML/M2
ECHO LV EDV NDEX A2C: 54 ML/M2
ECHO LV EJECTION FRACTION A2C: 73 %
ECHO LV EJECTION FRACTION A4C: 70 %
ECHO LV EJECTION FRACTION BIPLANE: 71 % (ref 55–100)
ECHO LV ESV A2C: 26 ML
ECHO LV ESV A4C: 25 ML
ECHO LV ESV BP: 26 ML (ref 22–58)
ECHO LV ESV INDEX A2C: 15 ML/M2
ECHO LV ESV INDEX A4C: 14 ML/M2
ECHO LV ESV INDEX BP: 15 ML/M2
ECHO LV FRACTIONAL SHORTENING: 29 % (ref 28–44)
ECHO LV INTERNAL DIMENSION DIASTOLE INDEX: 1.58 CM/M2
ECHO LV INTERNAL DIMENSION DIASTOLIC: 2.8 CM (ref 4.2–5.9)
ECHO LV INTERNAL DIMENSION SYSTOLIC INDEX: 1.13 CM/M2
ECHO LV INTERNAL DIMENSION SYSTOLIC: 2 CM
ECHO LV IVSD: 1.6 CM (ref 0.6–1)
ECHO LV IVSS: 1.4 CM
ECHO LV MASS 2D: 128.4 G (ref 88–224)
ECHO LV MASS INDEX 2D: 72.6 G/M2 (ref 49–115)
ECHO LV POSTERIOR WALL DIASTOLIC: 1.2 CM (ref 0.6–1)
ECHO LV POSTERIOR WALL SYSTOLIC: 1.1 CM
ECHO LV RELATIVE WALL THICKNESS RATIO: 0.86
ECHO LVOT AREA: 3.5 CM2
ECHO LVOT AV VTI INDEX: 0.57
ECHO LVOT DIAM: 2.1 CM
ECHO LVOT MEAN GRADIENT: 3 MMHG
ECHO LVOT PEAK GRADIENT: 4 MMHG
ECHO LVOT PEAK VELOCITY: 1 M/S
ECHO LVOT STROKE VOLUME INDEX: 43.2 ML/M2
ECHO LVOT SV: 76.5 ML
ECHO LVOT VTI: 22.1 CM
ECHO MV "A" WAVE DURATION: 124.6 MSEC
ECHO MV A VELOCITY: 1.04 M/S
ECHO MV AREA PHT: 2.7 CM2
ECHO MV AREA VTI: 3 CM2
ECHO MV E DECELERATION TIME (DT): 240.2 MS
ECHO MV E VELOCITY: 0.72 M/S
ECHO MV E/A RATIO: 0.69
ECHO MV LVOT VTI INDEX: 1.15
ECHO MV MAX VELOCITY: 1.2 M/S
ECHO MV MEAN GRADIENT: 2 MMHG
ECHO MV MEAN VELOCITY: 0.7 M/S
ECHO MV PEAK GRADIENT: 6 MMHG
ECHO MV PRESSURE HALF TIME (PHT): 81.2 MS
ECHO MV VTI: 25.5 CM
ECHO PVEIN A DURATION: 140.7 MS
ECHO PVEIN A VELOCITY: 0.3 M/S
ECHO PVEIN PEAK D VELOCITY: 0.4 M/S
ECHO PVEIN PEAK S VELOCITY: 0.7 M/S
ECHO PVEIN S/D RATIO: 1.8
ECHO RV INTERNAL DIMENSION: 2.5 CM
ECHO TV REGURGITANT MAX VELOCITY: 1.61 M/S
ECHO TV REGURGITANT PEAK GRADIENT: 10 MMHG
EOSINOPHIL # BLD: 2.28 K/UL (ref 0.05–0.5)
EOSINOPHILS RELATIVE PERCENT: 5 % (ref 0–6)
ERYTHROCYTE [DISTWIDTH] IN BLOOD BY AUTOMATED COUNT: 17.9 % (ref 11.5–15)
GFR, ESTIMATED: 40 ML/MIN/1.73M2
GLUCOSE BLD-MCNC: 102 MG/DL (ref 74–99)
GLUCOSE BLD-MCNC: 148 MG/DL (ref 74–99)
GLUCOSE BLD-MCNC: 157 MG/DL (ref 74–99)
GLUCOSE BLD-MCNC: 165 MG/DL (ref 74–99)
GLUCOSE SERPL-MCNC: 100 MG/DL (ref 74–99)
HCT VFR BLD AUTO: 26.4 % (ref 37–54)
HGB BLD-MCNC: 8.6 G/DL (ref 12.5–16.5)
LACTATE BLDV-SCNC: 0.9 MMOL/L (ref 0.5–2.2)
LYMPHOCYTES NFR BLD: 7.28 K/UL (ref 1.5–4)
LYMPHOCYTES RELATIVE PERCENT: 16 % (ref 20–42)
MAGNESIUM SERPL-MCNC: 1.5 MG/DL (ref 1.6–2.6)
MCH RBC QN AUTO: 32 PG (ref 26–35)
MCHC RBC AUTO-ENTMCNC: 32.6 G/DL (ref 32–34.5)
MCV RBC AUTO: 98.1 FL (ref 80–99.9)
MICROORGANISM SPEC CULT: NO GROWTH
MONOCYTES NFR BLD: 2.73 K/UL (ref 0.1–0.95)
MONOCYTES NFR BLD: 6 % (ref 2–12)
NEUTROPHILS NFR BLD: 54 % (ref 43–80)
NEUTS SEG NFR BLD: 24.57 K/UL (ref 1.8–7.3)
PHOSPHATE SERPL-MCNC: 5.5 MG/DL (ref 2.5–4.5)
PLATELET CONFIRMATION: NORMAL
PLATELET, FLUORESCENCE: 59 K/UL (ref 130–450)
PMV BLD AUTO: 13 FL (ref 7–12)
POTASSIUM SERPL-SCNC: 3.5 MMOL/L (ref 3.5–5)
PROT SERPL-MCNC: 8.4 G/DL (ref 6.4–8.3)
RBC # BLD AUTO: 2.69 M/UL (ref 3.8–5.8)
RBC # BLD: ABNORMAL 10*6/UL
RBC # BLD: ABNORMAL 10*6/UL
SERVICE CMNT-IMP: NORMAL
SODIUM SERPL-SCNC: 137 MMOL/L (ref 132–146)
SPECIMEN DESCRIPTION: NORMAL
TRANSFUSION STATUS: NORMAL
UNIT DIVISION: 0
UNIT ISSUE DATE/TIME: NORMAL
WBC OTHER # BLD: 45.5 K/UL (ref 4.5–11.5)

## 2024-06-04 PROCEDURE — 82962 GLUCOSE BLOOD TEST: CPT

## 2024-06-04 PROCEDURE — 85025 COMPLETE CBC W/AUTO DIFF WBC: CPT

## 2024-06-04 PROCEDURE — 6360000004 HC RX CONTRAST MEDICATION: Performed by: CLINICAL NURSE SPECIALIST

## 2024-06-04 PROCEDURE — 1200000000 HC SEMI PRIVATE

## 2024-06-04 PROCEDURE — 99232 SBSQ HOSP IP/OBS MODERATE 35: CPT | Performed by: INTERNAL MEDICINE

## 2024-06-04 PROCEDURE — 6370000000 HC RX 637 (ALT 250 FOR IP): Performed by: INTERNAL MEDICINE

## 2024-06-04 PROCEDURE — 80053 COMPREHEN METABOLIC PANEL: CPT

## 2024-06-04 PROCEDURE — 6360000002 HC RX W HCPCS: Performed by: INTERNAL MEDICINE

## 2024-06-04 PROCEDURE — C8929 TTE W OR WO FOL WCON,DOPPLER: HCPCS

## 2024-06-04 PROCEDURE — 83605 ASSAY OF LACTIC ACID: CPT

## 2024-06-04 PROCEDURE — 2580000003 HC RX 258: Performed by: INTERNAL MEDICINE

## 2024-06-04 PROCEDURE — 84100 ASSAY OF PHOSPHORUS: CPT

## 2024-06-04 PROCEDURE — 6370000000 HC RX 637 (ALT 250 FOR IP): Performed by: NURSE PRACTITIONER

## 2024-06-04 PROCEDURE — 83735 ASSAY OF MAGNESIUM: CPT

## 2024-06-04 RX ORDER — MAGNESIUM SULFATE 1 G/100ML
1000 INJECTION INTRAVENOUS ONCE
Status: COMPLETED | OUTPATIENT
Start: 2024-06-04 | End: 2024-06-04

## 2024-06-04 RX ORDER — LANOLIN ALCOHOL/MO/W.PET/CERES
3 CREAM (GRAM) TOPICAL NIGHTLY PRN
Status: DISCONTINUED | OUTPATIENT
Start: 2024-06-04 | End: 2024-06-05 | Stop reason: HOSPADM

## 2024-06-04 RX ORDER — SODIUM CHLORIDE 9 MG/ML
INJECTION, SOLUTION INTRAVENOUS CONTINUOUS
Status: DISCONTINUED | OUTPATIENT
Start: 2024-06-04 | End: 2024-06-05

## 2024-06-04 RX ADMIN — SODIUM CHLORIDE, PRESERVATIVE FREE 10 ML: 5 INJECTION INTRAVENOUS at 08:32

## 2024-06-04 RX ADMIN — Medication 3 MG: at 22:24

## 2024-06-04 RX ADMIN — HYDROXYUREA 1000 MG: 500 CAPSULE ORAL at 08:24

## 2024-06-04 RX ADMIN — EZETIMIBE 10 MG: 10 TABLET ORAL at 08:23

## 2024-06-04 RX ADMIN — SODIUM CHLORIDE: 9 INJECTION, SOLUTION INTRAVENOUS at 09:12

## 2024-06-04 RX ADMIN — SODIUM BICARBONATE 650 MG: 650 TABLET ORAL at 08:22

## 2024-06-04 RX ADMIN — PANTOPRAZOLE SODIUM 40 MG: 40 TABLET, DELAYED RELEASE ORAL at 08:23

## 2024-06-04 RX ADMIN — FOLIC ACID 500 MCG: 1 TABLET ORAL at 08:22

## 2024-06-04 RX ADMIN — CARVEDILOL 3.12 MG: 3.12 TABLET, FILM COATED ORAL at 08:30

## 2024-06-04 RX ADMIN — CARVEDILOL 3.12 MG: 3.12 TABLET, FILM COATED ORAL at 20:12

## 2024-06-04 RX ADMIN — SODIUM BICARBONATE 650 MG: 650 TABLET ORAL at 20:12

## 2024-06-04 RX ADMIN — MAGNESIUM SULFATE HEPTAHYDRATE 1000 MG: 1 INJECTION, SOLUTION INTRAVENOUS at 09:13

## 2024-06-04 RX ADMIN — Medication 3 MG: at 00:52

## 2024-06-04 RX ADMIN — PERFLUTREN 1.5 ML: 6.52 INJECTION, SUSPENSION INTRAVENOUS at 12:38

## 2024-06-04 RX ADMIN — ISOSORBIDE MONONITRATE 30 MG: 30 TABLET, EXTENDED RELEASE ORAL at 08:23

## 2024-06-04 RX ADMIN — ASPIRIN 81 MG: 81 TABLET, COATED ORAL at 08:23

## 2024-06-04 RX ADMIN — ATORVASTATIN CALCIUM 80 MG: 40 TABLET, FILM COATED ORAL at 08:22

## 2024-06-04 RX ADMIN — TAMSULOSIN HYDROCHLORIDE 0.4 MG: 0.4 CAPSULE ORAL at 08:23

## 2024-06-04 RX ADMIN — HYDROXYUREA 1000 MG: 500 CAPSULE ORAL at 20:12

## 2024-06-04 NOTE — ACP (ADVANCE CARE PLANNING)
Advance Care Planning   Healthcare Decision Maker:    Primary Decision Maker: Taylor Santiago - Spouse - [phone]    Click here to complete Healthcare Decision Makers including selection of the Healthcare Decision Maker Relationship (ie \"Primary\").

## 2024-06-04 NOTE — CARE COORDINATION
Met with patient about diagnosis and discharge plan of care. Pt admit for feeling fatigued, low hgb and shortness of breath. Cardio and oncology consults. Echo ordered. Pt has leukemia and currently on oral chemo. Hgb 7.0 and received 1 unit PRBC. Lives with spouse in home. Has cane and wheeled walker. PCP is Dr Arcos. Plan is home, declining needs. Will follow-o

## 2024-06-04 NOTE — PLAN OF CARE
Problem: ABCDS Injury Assessment  Goal: Absence of physical injury  6/4/2024 1338 by Jamey Calle RN  Outcome: Progressing     Problem: Discharge Planning  Goal: Discharge to home or other facility with appropriate resources  6/4/2024 1338 by Jamey Calle RN  Outcome: Progressing     Problem: Safety - Adult  Goal: Free from fall injury  6/4/2024 1338 by Jamey Calle RN  Outcome: Progressing

## 2024-06-05 VITALS
BODY MASS INDEX: 24.11 KG/M2 | SYSTOLIC BLOOD PRESSURE: 121 MMHG | HEART RATE: 76 BPM | HEIGHT: 66 IN | DIASTOLIC BLOOD PRESSURE: 71 MMHG | TEMPERATURE: 98.2 F | OXYGEN SATURATION: 98 % | WEIGHT: 150 LBS | RESPIRATION RATE: 16 BRPM

## 2024-06-05 LAB
ANION GAP SERPL CALCULATED.3IONS-SCNC: 12 MMOL/L (ref 7–16)
BASOPHILS # BLD: 0 K/UL (ref 0–0.2)
BASOPHILS NFR BLD: 0 % (ref 0–2)
BLASTS ABSOLUTE COUNT: 5.67 K/UL
BLASTS: 15 %
BUN SERPL-MCNC: 23 MG/DL (ref 6–23)
CALCIUM SERPL-MCNC: 8.7 MG/DL (ref 8.6–10.2)
CHLORIDE SERPL-SCNC: 102 MMOL/L (ref 98–107)
CO2 SERPL-SCNC: 22 MMOL/L (ref 22–29)
CREAT SERPL-MCNC: 1.7 MG/DL (ref 0.7–1.2)
EOSINOPHIL # BLD: 1.51 K/UL (ref 0.05–0.5)
EOSINOPHILS RELATIVE PERCENT: 4 % (ref 0–6)
ERYTHROCYTE [DISTWIDTH] IN BLOOD BY AUTOMATED COUNT: 17.3 % (ref 11.5–15)
GFR, ESTIMATED: 40 ML/MIN/1.73M2
GLUCOSE BLD-MCNC: 120 MG/DL (ref 74–99)
GLUCOSE BLD-MCNC: 138 MG/DL (ref 74–99)
GLUCOSE BLD-MCNC: 138 MG/DL (ref 74–99)
GLUCOSE BLD-MCNC: 160 MG/DL (ref 74–99)
GLUCOSE SERPL-MCNC: 120 MG/DL (ref 74–99)
HCT VFR BLD AUTO: 25.3 % (ref 37–54)
HGB BLD-MCNC: 8.3 G/DL (ref 12.5–16.5)
LYMPHOCYTES NFR BLD: 1.13 K/UL (ref 1.5–4)
LYMPHOCYTES RELATIVE PERCENT: 3 % (ref 20–42)
MAGNESIUM SERPL-MCNC: 1.9 MG/DL (ref 1.6–2.6)
MCH RBC QN AUTO: 32 PG (ref 26–35)
MCHC RBC AUTO-ENTMCNC: 32.8 G/DL (ref 32–34.5)
MCV RBC AUTO: 97.7 FL (ref 80–99.9)
METAMYELOCYTES ABSOLUTE COUNT: 0.38 K/UL (ref 0–0.12)
METAMYELOCYTES: 1 % (ref 0–1)
MONOCYTES NFR BLD: 10.21 K/UL (ref 0.1–0.95)
MONOCYTES NFR BLD: 27 % (ref 2–12)
NEUTROPHILS NFR BLD: 50 % (ref 43–80)
NEUTS SEG NFR BLD: 18.9 K/UL (ref 1.8–7.3)
PATH REV BLD -IMP: NORMAL
PHOSPHATE SERPL-MCNC: 5.2 MG/DL (ref 2.5–4.5)
PLATELET CONFIRMATION: NORMAL
PLATELET, FLUORESCENCE: 50 K/UL (ref 130–450)
PMV BLD AUTO: 11.4 FL (ref 7–12)
POTASSIUM SERPL-SCNC: 3.6 MMOL/L (ref 3.5–5)
RBC # BLD AUTO: 2.59 M/UL (ref 3.8–5.8)
RBC # BLD: ABNORMAL 10*6/UL
SODIUM SERPL-SCNC: 136 MMOL/L (ref 132–146)
WBC OTHER # BLD: 37.8 K/UL (ref 4.5–11.5)

## 2024-06-05 PROCEDURE — 6370000000 HC RX 637 (ALT 250 FOR IP): Performed by: INTERNAL MEDICINE

## 2024-06-05 PROCEDURE — 99239 HOSP IP/OBS DSCHRG MGMT >30: CPT | Performed by: INTERNAL MEDICINE

## 2024-06-05 PROCEDURE — 82962 GLUCOSE BLOOD TEST: CPT

## 2024-06-05 PROCEDURE — 2580000003 HC RX 258: Performed by: INTERNAL MEDICINE

## 2024-06-05 PROCEDURE — 80048 BASIC METABOLIC PNL TOTAL CA: CPT

## 2024-06-05 PROCEDURE — 85025 COMPLETE CBC W/AUTO DIFF WBC: CPT

## 2024-06-05 PROCEDURE — 84100 ASSAY OF PHOSPHORUS: CPT

## 2024-06-05 PROCEDURE — 83735 ASSAY OF MAGNESIUM: CPT

## 2024-06-05 RX ORDER — HYDROXYUREA 500 MG/1
1000 CAPSULE ORAL 2 TIMES DAILY
Qty: 112 CAPSULE | Refills: 0 | Status: SHIPPED | OUTPATIENT
Start: 2024-06-05 | End: 2024-07-03

## 2024-06-05 RX ADMIN — CARVEDILOL 3.12 MG: 3.12 TABLET, FILM COATED ORAL at 09:51

## 2024-06-05 RX ADMIN — FOLIC ACID 500 MCG: 1 TABLET ORAL at 09:50

## 2024-06-05 RX ADMIN — SODIUM CHLORIDE, PRESERVATIVE FREE 10 ML: 5 INJECTION INTRAVENOUS at 09:52

## 2024-06-05 RX ADMIN — HYDROXYUREA 1000 MG: 500 CAPSULE ORAL at 19:16

## 2024-06-05 RX ADMIN — TAMSULOSIN HYDROCHLORIDE 0.4 MG: 0.4 CAPSULE ORAL at 09:50

## 2024-06-05 RX ADMIN — HYDROXYUREA 1000 MG: 500 CAPSULE ORAL at 09:51

## 2024-06-05 RX ADMIN — ATORVASTATIN CALCIUM 80 MG: 40 TABLET, FILM COATED ORAL at 09:50

## 2024-06-05 RX ADMIN — SODIUM BICARBONATE 650 MG: 650 TABLET ORAL at 09:50

## 2024-06-05 RX ADMIN — PANTOPRAZOLE SODIUM 40 MG: 40 TABLET, DELAYED RELEASE ORAL at 05:29

## 2024-06-05 RX ADMIN — ASPIRIN 81 MG: 81 TABLET, COATED ORAL at 09:51

## 2024-06-05 RX ADMIN — EZETIMIBE 10 MG: 10 TABLET ORAL at 09:50

## 2024-06-05 RX ADMIN — ISOSORBIDE MONONITRATE 30 MG: 30 TABLET, EXTENDED RELEASE ORAL at 09:50

## 2024-06-05 NOTE — PLAN OF CARE
Problem: ABCDS Injury Assessment  Goal: Absence of physical injury  6/5/2024 1054 by Jacquie Zuniga RN  Outcome: Progressing  6/5/2024 0021 by Sonia Toney RN  Outcome: Progressing     Problem: Discharge Planning  Goal: Discharge to home or other facility with appropriate resources  6/5/2024 1054 by Jacquie Zuniga RN  Outcome: Progressing  6/5/2024 0021 by Sonia Toney, RN  Outcome: Progressing     Problem: Safety - Adult  Goal: Free from fall injury  6/5/2024 1054 by Jacquie Zuniga RN  Outcome: Progressing  6/5/2024 0021 by Sonia Toney, RN  Outcome: Progressing

## 2024-06-05 NOTE — PROGRESS NOTES
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 Patient denies fever, chills, cp, sob, n/v.     aspirin  81 mg Oral Daily    atorvastatin  80 mg Oral Daily    carvedilol  3.125 mg Oral BID    ezetimibe  10 mg Oral Daily    folic acid  500 mcg Oral Daily    isosorbide mononitrate  30 mg Oral Daily    pantoprazole  40 mg Oral QAM AC    tamsulosin  0.4 mg Oral Daily    sodium bicarbonate  650 mg Oral BID    sodium chloride flush  5-40 mL IntraVENous 2 times per day    [Held by provider] enoxaparin  30 mg SubCUTAneous Daily    insulin lispro  0-4 Units SubCUTAneous TID WC    insulin lispro  0-4 Units SubCUTAneous Nightly    hydroxyurea  1,000 mg Oral BID     melatonin, 3 mg, Nightly PRN  sodium chloride, , PRN  docusate sodium, 100 mg, Daily PRN  sodium chloride flush, 5-40 mL, PRN  sodium chloride, , PRN  ondansetron, 4 mg, Q8H PRN   Or  ondansetron, 4 mg, Q6H PRN  polyethylene glycol, 17 g, Daily PRN  acetaminophen, 650 mg, Q6H PRN   Or  acetaminophen, 650 mg, Q6H PRN  dextrose bolus, 125 mL, PRN   Or  dextrose bolus, 250 mL, PRN  glucagon (rDNA), 1 mg, PRN  dextrose, , Continuous PRN  Glucose, 15 g, PRN         Objective:    BP (!) 165/94   Pulse 78   Temp 98.4 °F (36.9 °C) (Oral)   Resp 16   Ht 1.676 m (5' 6\")   Wt 68 kg (150 lb)   
    INPATIENT CARDIOLOGY FOLLOW-UP    Name: Frederick Santiago    Age: 88 y.o.    Date of Admission: 6/3/2024 12:07 PM    Date of Service: 6/4/2024    Primary Cardiologist: Dr. Saini    Chief Complaint: Follow-up for demand ischemia    Interim History:  No new overnight cardiac complaints. Currently with no complaints of CP, SOB, palpitations, dizziness, or lightheadedness. SR on telemetry.  No significant arrhythmia seen.  Received blood transfusion yesterday.  Feeling better.  No recurrence of chest pain.    Review of Systems:   Negative except as described above    Problem List:  Patient Active Problem List   Diagnosis    Coronary artery disease    Dyslipidemia    Diastolic dysfunction, left ventricle    JANE (dyspnea on exertion)    Mitral regurgitation    Over weight    Non morbid obesity due to excess calories    H/O total knee replacement    Symptomatic anemia    Anal fissure    OA (osteoarthritis)    GERD (gastroesophageal reflux disease)    Spinal stenosis, lumbosacral region    Diabetic peripheral neuropathy (HCC)    Low grade myelodysplastic syndrome lesions (HCC)    Hypotension due to hypovolemia    Type 2 diabetes mellitus with diabetic peripheral angiopathy without gangrene, without long-term current use of insulin ([facility])    Pure hypercholesterolemia    Low back pain    Pain of left lower extremity    Lumbar stenosis    Cardiac enzymes elevated    Postprocedural hypotension    Lower GI bleed    Exertional chest pain    Acute pancreatitis    Hyperlipidemia, mixed    Influenzal pneumonia    Stage 3a chronic kidney disease (CKD) (HCC)    Syncope and collapse    Renal tubular acidosis    Leukocytosis    Acidosis    COVID-19 virus infection    Uncontrolled type 2 diabetes mellitus with hyperglycemia (HCC)    MDS (myelodysplastic syndrome) (HCC)    Chronic systolic (congestive) heart failure    CHARANJIT (acute kidney injury) ([facility])    Ischemic cardiomyopathy    Acute decompensated heart failure ([facility])    Primary 
4 Eyes Skin Assessment     NAME:  Frederick Santiago  YOB: 1935  MEDICAL RECORD NUMBER:  [id number]    The patient is being assessed for  Admission    I agree that at least one RN has performed a thorough Head to Toe Skin Assessment on the patient. ALL assessment sites listed below have been assessed.      Areas assessed by both nurses:    Head, Face, Ears, Shoulders, Back, Chest, Arms, Elbows, Hands, Sacrum. Buttock, Coccyx, Ischium, and Legs. Feet and Heels        Does the Patient have a Wound? No noted wound(s)       Ambrose Prevention initiated by RN: No  Wound Care Orders initiated by RN: No    Pressure Injury (Stage 3,4, Unstageable, DTI, NWPT, and Complex wounds) if present, place Wound referral order by RN under : No    New Ostomies, if present place, Ostomy referral order under : No     Nurse 1 eSignature: Electronically signed by Carli Ibarra RN on 6/3/24 at 8:10 PM EDT    **SHARE this note so that the co-signing nurse can place an eSignature**    Nurse 2 eSignature: {Esignature:[id number]}    
[facility]  Hematology/Oncology  Consult      Patient Name: Frederick Santiago  YOB: 1935  PCP: Wilbert Arcos DO   Referring Provider:      Reason for Consultation:   Chief Complaint   Patient presents with    Fatigue    Abnormal Lab     Hgb 7.7 on Tuesday, hx transfusions, hx AML        History of Present Illness:   This is an 88-year-old male patient that follows with Dr. Garcia for AML on hydroxyurea. He had been doing well with no blasts in peripheral smear. Until last April when his WBC count jumped to 23 with a differential of mainly neutrophilic leukocytosis and monocytosis. No fever or recent infections. No intake of steroids. His hydroxyurea dose was increased to 1000 mg in a.m. and 500 mg in p.m. He was followed up 2 weeks later and his WBC responded well to 2.0 with an ANC of 0.8.  He was then scaled back again down to 500 mg twice daily. Patient presented to the ED for evaluation of chest tightness.  CXR was negative for acute process. Cardiology was consulted with ProBNP 1264. Plan to check ECHO with further recommendations pending results. Transfuse to keep Hgb <7. Hgb today was 7.0 and 1 unit of pRBC's was ordered. CBC also went from WBC 8.0 on 5/28/24 to WBC of 43.9 today with ANC 22.39, blasts 21%, platelets 64. Consultation for AML      Diagnostic Data:     Past Medical History:   Diagnosis Date    Anal fissure 10/27/2016    Anemia     Arthritis     CAD (coronary artery disease)     Diabetes mellitus (HCC)     ADULT ONSET    GERD (gastroesophageal reflux disease) 10/27/2016    Gout     History of blood transfusion     Hyperkalemia     Hyperlipidemia     Low back pain 07/21/2017    Low grade myelodysplastic syndrome lesions (HCC) 06/13/2017    OA (osteoarthritis) 10/27/2016    Pain of left lower extremity 07/21/2017    Rectal fissure 1987       Patient Active Problem List    Diagnosis Date Noted    Stage 3a chronic kidney disease (CKD) (HCC) 02/20/2023    Influenzal 
[facility]  Hematology/Oncology  Consult      Patient Name: Frederick Santiago  YOB: 1935  PCP: Wilbert Arcos DO   Referring Provider:      Reason for Consultation:   Chief Complaint   Patient presents with    Fatigue    Abnormal Lab     Hgb 7.7 on Tuesday, hx transfusions, hx AML        History of Present Illness:   This is an 88-year-old male patient that follows with Dr. Garcia for AML on hydroxyurea. He had been doing well with no blasts in peripheral smear. Until last April when his WBC count jumped to 23 with a differential of mainly neutrophilic leukocytosis and monocytosis. No fever or recent infections. No intake of steroids. His hydroxyurea dose was increased to 1000 mg in a.m. and 500 mg in p.m. He was followed up 2 weeks later and his WBC responded well to 2.0 with an ANC of 0.8.  He was then scaled back again down to 500 mg twice daily. Patient presented to the ED for evaluation of chest tightness.  CXR was negative for acute process. Cardiology was consulted with ProBNP 1264. Plan to check ECHO with further recommendations pending results. Transfuse to keep Hgb <7. Hgb today was 7.0 and 1 unit of pRBC's was ordered. CBC also went from WBC 8.0 on 5/28/24 to WBC of 43.9 today with ANC 22.39, blasts 21%, platelets 64. Consultation for AML      Diagnostic Data:     Past Medical History:   Diagnosis Date    Anal fissure 10/27/2016    Anemia     Arthritis     CAD (coronary artery disease)     Diabetes mellitus (HCC)     ADULT ONSET    GERD (gastroesophageal reflux disease) 10/27/2016    Gout     History of blood transfusion     Hyperkalemia     Hyperlipidemia     Low back pain 07/21/2017    Low grade myelodysplastic syndrome lesions (HCC) 06/13/2017    OA (osteoarthritis) 10/27/2016    Pain of left lower extremity 07/21/2017    Rectal fissure 1987       Patient Active Problem List    Diagnosis Date Noted    Stage 3a chronic kidney disease (CKD) (HCC) 02/20/2023    Influenzal 
[facility] Quality Flow/Interdisciplinary Rounds Progress Note        Quality Flow Rounds held on June 5, 2024    Disciplines Attending:  Bedside Nurse, , , and Nursing Unit Leadership    Frederick Santiago was admitted on 6/3/2024 12:07 PM    Anticipated Discharge Date:       Disposition:    Ambrose Score:  Ambrose Scale Score: 22    Readmission Risk              Risk of Unplanned Readmission:  23           Discussed patient goal for the day, patient clinical progression, and barriers to discharge.  The following Goal(s) of the Day/Commitment(s) have been identified:   nephrology consult pending, discharge planning      Braydon Tuttle RN  June 5, 2024        
Consult for alen sent to Dr. Sampson  
Database initiated. Patient is A&O comes in from home with wife. States he uses a cane and a walker and is RA at baseline. He is having diarrhea.     
[facility] Quality Flow/Interdisciplinary Rounds Progress Note        Quality Flow Rounds held on June 4, 2024    Disciplines Attending:  Bedside Nurse, , , and Nursing Unit Leadership    Frederick Santiago was admitted on 6/3/2024 12:07 PM    Anticipated Discharge Date:       Disposition:    Ambrose Score:  Ambrose Scale Score: 22    Readmission Risk              Risk of Unplanned Readmission:  22           Discussed patient goal for the day, patient clinical progression, and barriers to discharge.  The following Goal(s) of the Day/Commitment(s) have been identified:   echo pending, discharge planning      Braydon Tuttle RN  June 4, 2024        
Okay with doctor Brent to hold lovenox,  
Patient declined  visit.   
SPIRITUAL HEALTH SERVICES - [facility]  PROGRESS NOTE    Name: Frederick Santiago                Religious: Scientologist  Anointed (Last Rites): NA    Referral: Spiritual Care Consult    Assessment:   checked on patient at the request of the charge nurse. Patient was receptive to  visit.     Intervention:   explored patient's thoughts, feelings, and concerns. Patient shared about having cancer, being thankful for the life and independence God has given him, and his eleuterio as a Scientologist. Patient discussed health, Muslim, and being prepared to die when the time comes.  provided a listening presence, affirmed patient's thoughts and feelings, and said a blessing for him.    Outcome:  Patient expressed appreciation for being able to talk and stopping by to check on him.     Plan:  Chaplains will remain available to offer spiritual and emotional support as needed.      Electronically signed by Chaplain Dagmar, on 6/5/2024 at 2:41 PM.  Spiritual Care Department  [facility]  [phone]     
03/05/2024 10:15 AM    GLUCOSE 100 06/04/2024 05:12 AM    GLUCOSE 89 05/03/2012 10:00 AM    CALCIUM 9.2 06/04/2024 05:12 AM    BILITOT 0.4 06/04/2024 05:12 AM    ALKPHOS 41 06/04/2024 05:12 AM    AST 30 06/04/2024 05:12 AM    ALT 16 06/04/2024 05:12 AM     Magnesium:    Lab Results   Component Value Date/Time    MG 1.5 06/04/2024 05:12 AM     Phosphorus:    Lab Results   Component Value Date/Time    PHOS 5.5 06/04/2024 05:12 AM        Radiology:   XR CHEST PORTABLE   Final Result   No acute process.             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 *      Plan:  Chest pain cardio following  pt s/p transfusion. Pt denies pain  AML hem following hydroxyurea increased  Asaf iv fluids  Anemia, symptomatic  improving s/p transfusion continue to monitor  Htn continue med  Gerd continue ppi  Bph continue med  Hyperlipidemia continue med  Dm type 2 controlled monitor bs and tx with insulin        Electronically signed by Stefano Kennedy, DO on 6/4/2024 at 3:00 PM

## 2024-06-05 NOTE — CONSULTS
1.3  AML (Acute Myelogenous Leukemia) -follows with Dr Garcia   Bone Marrow Bx 10/27/23   On Hydroxyurea   Per Dr. Garcia note 10/23 -in light of old age, multiple comorbid conditions, not candidate for aggressive management-poor candidate for chemotherapy patient requested palliative and supportive care only during this visit patient was noted to have Hgb 6.9 and was transfused with 1 unit of packed red blood cells 10/24/2023  Wife reports that last blood transfusion as an outpatient September 2024  Chronic Fe Def Anemia   Hx Hyperkalemia   3/5/2024 K5.0; 1/9/2024 K4.9; 11/9/2023 K4.9  Chronic intermittent dizziness   Gout - on Allopurinol   BPH on Flomax  OA s/p bilateral knee replacement   Lumbar laminectomy   Anxiety / Insomnia - script for Ambien   IV Dye allergy   Admit 5/23 acute hypoxic respiratory failure/COVID    Cardiac Testing:    Catheterization 12/3/2015  Dr. Lora   CORONARY ANGIOGRAPHY:   Left main:  The left main arteryis heavily calcified but there did not appear   to be any significant angiographic luminal narrowing.      LAD:  The left anterior descending artery isheavily calcified in its proximal   segment.  Very shortly after the origin of the vessel, there is a very focal,   concentric, 50% luminal narrowing.  Shortly thereafter, there is a stent in   the proximal LAD that extends to the midvessel after the origin of a very   large diagonal branch.  There is 50% stenosis in the middle segment of this   stent followed by another 50% stenosis at the distal edge of the stent.  The   LAD distally tapers down to become a very small-caliber vessel.  The diagonal   branch itself is patent with no more than 20% ostial narrowing.      LCX:  The left circumflex gives rise to a high 1st marginal branch, which   itself has around 70% stenosis in its proximal segment before it divides into   a superior and inferior subdivision.  The rest of the left circumflex, which   continues as a lateral branch, did 
03:25 AM    CREATININE 1.7 06/05/2024 03:25 AM    GFRAA 58 10/11/2022 01:21 PM    LABGLOM 40 06/05/2024 03:25 AM    LABGLOM 55 03/05/2024 10:15 AM    GLUCOSE 120 06/05/2024 03:25 AM    GLUCOSE 89 05/03/2012 10:00 AM    CALCIUM 8.7 06/05/2024 03:25 AM    BILITOT 0.4 06/04/2024 05:12 AM    ALKPHOS 41 06/04/2024 05:12 AM    AST 30 06/04/2024 05:12 AM    ALT 16 06/04/2024 05:12 AM     Ionized Calcium:  No components found for: \"IONCA\"  Magnesium:    Lab Results   Component Value Date/Time    MG 1.9 06/05/2024 03:25 AM     Phosphorus:    Lab Results   Component Value Date/Time    PHOS 5.2 06/05/2024 03:25 AM     U/A:    Lab Results   Component Value Date/Time    COLORU Yellow 06/03/2024 01:31 PM    PHUR 6.0 06/03/2024 01:31 PM    PHUR 6.0 05/30/2023 10:33 PM    WBCUA 0 TO 5 06/03/2024 01:31 PM    WBCUA 0-1 09/20/2011 09:45 AM    RBCUA 3 to 5 06/03/2024 01:31 PM    RBCUA NONE 09/20/2011 09:45 AM    BACTERIA NONE SEEN 05/30/2023 10:33 PM    CLARITYU Clear 05/30/2023 10:33 PM    LEUKOCYTESUR NEGATIVE 06/03/2024 01:31 PM    UROBILINOGEN 0.2 06/03/2024 01:31 PM    BILIRUBINUR NEGATIVE 06/03/2024 01:31 PM    BLOODU Negative 05/30/2023 10:33 PM    GLUCOSEU NEGATIVE 06/03/2024 01:31 PM    GLUCOSEU NEGATIVE 09/20/2011 09:45 AM     Microalbumen/Creatinine ratio:  No components found for: \"RUCREAT\"  Iron Saturation:  No components found for: \"PERCENTFE\"  TIBC:    Lab Results   Component Value Date/Time    TIBC 286 04/15/2021 12:00 AM     FERRITIN:    Lab Results   Component Value Date/Time    FERRITIN 158.65 04/15/2021 12:00 AM        Imaging:  XR CHEST PORTABLE   Final Result   No acute process.             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  
tablet Take 1 tablet by mouth daily    Provider, MD Antionette   vitamin B-12 (CYANOCOBALAMIN) 1000 MCG tablet Take 1 tablet by mouth daily    ProviderAntionette MD   folic acid (FOLVITE) 400 MCG tablet Take 1 tablet by mouth daily    ProviderAntionette MD       Allergies  Allergies   Allergen Reactions    Iodine Rash     IV Iodine    Benadryl [Diphenhydramine]      IV BENADRYL     Ramipril      cough       Review of Systems:    As in HPI above otherwise negative.       Objective  /67   Pulse 72   Temp 97.9 °F (36.6 °C) (Oral)   Resp 19   Ht 1.676 m (5' 6\")   Wt 68 kg (150 lb)   SpO2 98%   BMI 24.21 kg/m²     Physical Exam:   Performance Status:  General: AAO to person, place, time, in no acute distress,   Head and neck : PERRLA, EOMI. Sclera non icteric  Oropharynx : Clear  Neck: no JVD,  no adenopathy  Heart: Regular rate and regular rhythm, no murmur  Lungs: Clear to auscultation   Extremities: No edema  Abdomen: Soft, non-tender  Skin:  No rash.  Neurologic:Cranial nerves grossly intact. No focal motor deficits    Recent Laboratory Data-   Lab Results   Component Value Date    WBC 43.9 (H) 06/03/2024    HGB 7.0 (L) 06/03/2024    HCT 22.7 (L) 06/03/2024    .3 (H) 06/03/2024     03/05/2024    LYMPHOPCT 17 (L) 06/03/2024    RBC 2.22 (L) 06/03/2024    MCH 31.5 06/03/2024    MCHC 30.8 (L) 06/03/2024    RDW 18.0 (H) 06/03/2024    NEUTOPHILPCT 51 06/03/2024    MONOPCT 1 (L) 06/03/2024    EOSPCT 2 06/03/2024    BASOPCT 1 06/03/2024    NEUTROABS 22.39 (H) 06/03/2024    LYMPHSABS 1.59 05/03/2012    MONOSABS 0.44 06/03/2024    EOSABS 0.88 (H) 06/03/2024    BASOSABS 0.44 (H) 06/03/2024       Lab Results   Component Value Date     06/03/2024    K 4.2 06/03/2024     06/03/2024    CO2 23 06/03/2024    BUN 22 06/03/2024    CREATININE 1.9 (H) 06/03/2024    GLUCOSE 104 (H) 06/03/2024    CALCIUM 8.9 06/03/2024    BILITOT 0.3 06/03/2024    ALKPHOS 40 06/03/2024    AST 27 06/03/2024

## 2024-06-05 NOTE — DISCHARGE SUMMARY
[facility] Hospitalist       Hospitalist Physician Discharge Summary       No follow-up provider specified.    Activity level: as belen    Diet: ADULT DIET; Regular; 5 carb choices (75 gm/meal)    Dispo:home    Condition at discharge: fair          Patient ID:  Frederick Santiago  [id number]  88 y.o.  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 *    Chest pain  AML  Charanjit  Anemia, symptomatic  Htn  Gerd  Bph  Hyperlipidemia  Dm type 2 controlled      Consults:  IP CONSULT TO CARDIOLOGY  IP CONSULT TO HEM/ONC  IP CONSULT TO NEPHROLOGY    Procedures: echo    Left Ventricle: Normal left ventricular systolic function with a visually estimated EF of 60 - 65%. Left ventricle size is normal. Normal wall thickness. Findings consistent with concentric remodeling. Normal wall motion. Normal diastolic function.    Right Ventricle: Right ventricle size is normal. Normal systolic function.    Aortic Valve: Mild sclerosis of the aortic valve cusp.    Tricuspid Valve: Unable to assess RVSP.    Left Atrium: Left atrium is mildly dilated.    Right Atrium: Right atrium size is normal.    Image quality is

## 2024-06-05 NOTE — PLAN OF CARE
Problem: ABCDS Injury Assessment  Goal: Absence of physical injury  6/5/2024 0021 by Sonia Toney RN  Outcome: Progressing  6/4/2024 1338 by Jamey Calle RN  Outcome: Progressing     Problem: Discharge Planning  Goal: Discharge to home or other facility with appropriate resources  6/5/2024 0021 by Sonia Toney RN  Outcome: Progressing  6/4/2024 1338 by Jamey Calle RN  Outcome: Progressing     Problem: Safety - Adult  Goal: Free from fall injury  6/5/2024 0021 by Sonia Toney RN  Outcome: Progressing  6/4/2024 1338 by Jamey Calle RN  Outcome: Progressing

## 2024-06-05 NOTE — CARE COORDINATION
Updated plan of care. Creat remains high, renal consult pending. Echo done. Plan remains home with no needs. Will continue to follow-o

## 2024-06-11 DIAGNOSIS — D46.9 MDS (MYELODYSPLASTIC SYNDROME) (HCC): Primary | ICD-10-CM

## 2024-06-11 RX ORDER — FAMOTIDINE 10 MG/ML
20 INJECTION, SOLUTION INTRAVENOUS
Status: CANCELLED | OUTPATIENT
Start: 2024-06-12

## 2024-06-11 RX ORDER — ACETAMINOPHEN 325 MG/1
650 TABLET ORAL ONCE
Status: CANCELLED | OUTPATIENT
Start: 2024-06-12 | End: 2024-06-12

## 2024-06-11 RX ORDER — DIPHENHYDRAMINE HYDROCHLORIDE 50 MG/ML
50 INJECTION INTRAMUSCULAR; INTRAVENOUS
Status: CANCELLED | OUTPATIENT
Start: 2024-06-12

## 2024-06-11 RX ORDER — SODIUM CHLORIDE 9 MG/ML
INJECTION, SOLUTION INTRAVENOUS CONTINUOUS
Status: CANCELLED | OUTPATIENT
Start: 2024-06-12

## 2024-06-11 RX ORDER — EPINEPHRINE 1 MG/ML
0.3 INJECTION, SOLUTION, CONCENTRATE INTRAVENOUS PRN
Status: CANCELLED | OUTPATIENT
Start: 2024-06-12

## 2024-06-11 RX ORDER — ACETAMINOPHEN 325 MG/1
650 TABLET ORAL
Status: CANCELLED | OUTPATIENT
Start: 2024-06-12

## 2024-06-11 RX ORDER — ONDANSETRON 2 MG/ML
8 INJECTION INTRAMUSCULAR; INTRAVENOUS
Status: CANCELLED | OUTPATIENT
Start: 2024-06-12

## 2024-06-11 RX ORDER — ALBUTEROL SULFATE 90 UG/1
4 AEROSOL, METERED RESPIRATORY (INHALATION) PRN
Status: CANCELLED | OUTPATIENT
Start: 2024-06-12

## 2024-06-11 RX ORDER — SODIUM CHLORIDE 0.9 % (FLUSH) 0.9 %
5-40 SYRINGE (ML) INJECTION PRN
Status: CANCELLED | OUTPATIENT
Start: 2024-06-12

## 2024-06-11 RX ORDER — SODIUM CHLORIDE 9 MG/ML
20 INJECTION, SOLUTION INTRAVENOUS CONTINUOUS
Status: CANCELLED | OUTPATIENT
Start: 2024-06-12

## 2024-06-11 RX ORDER — SODIUM CHLORIDE 9 MG/ML
25 INJECTION, SOLUTION INTRAVENOUS PRN
Status: CANCELLED | OUTPATIENT
Start: 2024-06-12

## 2024-06-12 ENCOUNTER — HOSPITAL ENCOUNTER (OUTPATIENT)
Dept: INFUSION THERAPY | Age: 89
Discharge: HOME | End: 2024-06-12
Payer: MEDICARE

## 2024-06-12 VITALS
OXYGEN SATURATION: 97 % | TEMPERATURE: 97.7 F | DIASTOLIC BLOOD PRESSURE: 61 MMHG | HEART RATE: 67 BPM | SYSTOLIC BLOOD PRESSURE: 124 MMHG | RESPIRATION RATE: 18 BRPM

## 2024-06-12 DIAGNOSIS — D46.9 MDS (MYELODYSPLASTIC SYNDROME) (HCC): Primary | ICD-10-CM

## 2024-06-12 PROCEDURE — 36430 TRANSFUSION BLD/BLD COMPNT: CPT

## 2024-06-12 PROCEDURE — 6360000002 HC RX W HCPCS: Performed by: NURSE PRACTITIONER

## 2024-06-12 PROCEDURE — 6370000000 HC RX 637 (ALT 250 FOR IP): Performed by: NURSE PRACTITIONER

## 2024-06-12 PROCEDURE — 2580000003 HC RX 258: Performed by: NURSE PRACTITIONER

## 2024-06-12 PROCEDURE — 96375 TX/PRO/DX INJ NEW DRUG ADDON: CPT

## 2024-06-12 PROCEDURE — 96374 THER/PROPH/DIAG INJ IV PUSH: CPT

## 2024-06-12 RX ORDER — ALBUTEROL SULFATE 90 UG/1
4 AEROSOL, METERED RESPIRATORY (INHALATION) PRN
OUTPATIENT
Start: 2024-06-12

## 2024-06-12 RX ORDER — DIPHENHYDRAMINE HYDROCHLORIDE 50 MG/ML
50 INJECTION INTRAMUSCULAR; INTRAVENOUS
OUTPATIENT
Start: 2024-06-12

## 2024-06-12 RX ORDER — SODIUM CHLORIDE 0.9 % (FLUSH) 0.9 %
5-40 SYRINGE (ML) INJECTION PRN
OUTPATIENT
Start: 2024-06-12

## 2024-06-12 RX ORDER — SODIUM CHLORIDE 9 MG/ML
25 INJECTION, SOLUTION INTRAVENOUS PRN
OUTPATIENT
Start: 2024-06-12

## 2024-06-12 RX ORDER — ACETAMINOPHEN 325 MG/1
650 TABLET ORAL ONCE
Status: COMPLETED | OUTPATIENT
Start: 2024-06-12 | End: 2024-06-12

## 2024-06-12 RX ORDER — EPINEPHRINE 1 MG/ML
0.3 INJECTION, SOLUTION, CONCENTRATE INTRAVENOUS PRN
OUTPATIENT
Start: 2024-06-12

## 2024-06-12 RX ORDER — ACETAMINOPHEN 325 MG/1
650 TABLET ORAL ONCE
Status: CANCELLED | OUTPATIENT
Start: 2024-06-12 | End: 2024-06-12

## 2024-06-12 RX ORDER — SODIUM CHLORIDE 9 MG/ML
20 INJECTION, SOLUTION INTRAVENOUS CONTINUOUS
Status: CANCELLED | OUTPATIENT
Start: 2024-06-12

## 2024-06-12 RX ORDER — SODIUM CHLORIDE 9 MG/ML
INJECTION, SOLUTION INTRAVENOUS CONTINUOUS
OUTPATIENT
Start: 2024-06-12

## 2024-06-12 RX ORDER — SODIUM CHLORIDE 9 MG/ML
20 INJECTION, SOLUTION INTRAVENOUS CONTINUOUS
Status: DISCONTINUED | OUTPATIENT
Start: 2024-06-12 | End: 2024-06-13 | Stop reason: HOSPADM

## 2024-06-12 RX ORDER — ONDANSETRON 2 MG/ML
8 INJECTION INTRAMUSCULAR; INTRAVENOUS
OUTPATIENT
Start: 2024-06-12

## 2024-06-12 RX ORDER — ACETAMINOPHEN 325 MG/1
650 TABLET ORAL
OUTPATIENT
Start: 2024-06-12

## 2024-06-12 RX ADMIN — HYDROCORTISONE SODIUM SUCCINATE 25 MG: 100 INJECTION, POWDER, FOR SOLUTION INTRAMUSCULAR; INTRAVENOUS at 08:44

## 2024-06-12 RX ADMIN — ACETAMINOPHEN 650 MG: 325 TABLET, FILM COATED ORAL at 08:40

## 2024-06-12 RX ADMIN — SODIUM CHLORIDE 50 ML/HR: 9 INJECTION, SOLUTION INTRAVENOUS at 08:44

## 2024-06-12 ASSESSMENT — PAIN SCALES - GENERAL: PAINLEVEL_OUTOF10: 0

## 2024-06-12 NOTE — PROGRESS NOTES
Patient tolerated one unit of PRBC transfusion well without complications or complaints. Alert and oriented x3. Patient aware of potential side effects and has no questions regarding treatment.  Patient ambulated to restroom with minimal assist of staff-Wife present t/o transfusion-Pain assessed, patient denies any new or worsening pain. Patient left via w.c with wife

## 2024-06-13 LAB
ANION GAP SERPL CALCULATED.3IONS-SCNC: 12 MMOL/L (ref 7–16)
ANTIBODY SCREEN: NEGATIVE
ARM BAND NUMBER: NORMAL
BLOOD BANK BLOOD PRODUCT EXPIRATION DATE: NORMAL
BLOOD BANK DISPENSE STATUS: NORMAL
BLOOD BANK ISBT PRODUCT BLOOD TYPE: 7300
BLOOD BANK PRODUCT CODE: NORMAL
BLOOD BANK SAMPLE EXPIRATION: NORMAL
BLOOD BANK UNIT TYPE AND RH: NORMAL
BPU ID: NORMAL
BUN SERPL-MCNC: 24 MG/DL (ref 6–23)
CALCIUM SERPL-MCNC: 8.5 MG/DL (ref 8.6–10.2)
CHLORIDE SERPL-SCNC: 104 MMOL/L (ref 98–107)
CO2 SERPL-SCNC: 22 MMOL/L (ref 22–29)
COMPONENT: NORMAL
CREAT SERPL-MCNC: 1.7 MG/DL (ref 0.7–1.2)
CROSSMATCH RESULT: NORMAL
GFR, ESTIMATED: 38 ML/MIN/1.73M2
GLUCOSE SERPL-MCNC: 120 MG/DL (ref 74–99)
MAGNESIUM SERPL-MCNC: 2 MG/DL (ref 1.6–2.6)
PHOSPHATE SERPL-MCNC: 4.2 MG/DL (ref 2.5–4.5)
POTASSIUM SERPL-SCNC: 3.8 MMOL/L (ref 3.5–5)
SODIUM SERPL-SCNC: 138 MMOL/L (ref 132–146)
TRANSFUSION STATUS: NORMAL
UNIT DIVISION: 0
UNIT ISSUE DATE/TIME: NORMAL

## 2024-06-17 DIAGNOSIS — D46.9 MDS (MYELODYSPLASTIC SYNDROME) (HCC): Primary | ICD-10-CM

## 2024-06-17 RX ORDER — SODIUM CHLORIDE 9 MG/ML
20 INJECTION, SOLUTION INTRAVENOUS CONTINUOUS
Status: CANCELLED | OUTPATIENT
Start: 2024-06-18

## 2024-06-17 RX ORDER — ACETAMINOPHEN 325 MG/1
650 TABLET ORAL
Status: CANCELLED | OUTPATIENT
Start: 2024-06-18

## 2024-06-17 RX ORDER — SODIUM CHLORIDE 0.9 % (FLUSH) 0.9 %
5-40 SYRINGE (ML) INJECTION PRN
Status: CANCELLED | OUTPATIENT
Start: 2024-06-18

## 2024-06-17 RX ORDER — ALBUTEROL SULFATE 90 UG/1
4 AEROSOL, METERED RESPIRATORY (INHALATION) PRN
Status: CANCELLED | OUTPATIENT
Start: 2024-06-18

## 2024-06-17 RX ORDER — FAMOTIDINE 10 MG/ML
20 INJECTION, SOLUTION INTRAVENOUS
Status: CANCELLED | OUTPATIENT
Start: 2024-06-18

## 2024-06-17 RX ORDER — ONDANSETRON 2 MG/ML
8 INJECTION INTRAMUSCULAR; INTRAVENOUS
Status: CANCELLED | OUTPATIENT
Start: 2024-06-18

## 2024-06-17 RX ORDER — EPINEPHRINE 1 MG/ML
0.3 INJECTION, SOLUTION, CONCENTRATE INTRAVENOUS PRN
Status: CANCELLED | OUTPATIENT
Start: 2024-06-18

## 2024-06-17 RX ORDER — ACETAMINOPHEN 325 MG/1
650 TABLET ORAL ONCE
Status: CANCELLED | OUTPATIENT
Start: 2024-06-18 | End: 2024-06-18

## 2024-06-17 RX ORDER — SODIUM CHLORIDE 9 MG/ML
25 INJECTION, SOLUTION INTRAVENOUS PRN
Status: CANCELLED | OUTPATIENT
Start: 2024-06-18

## 2024-06-17 RX ORDER — SODIUM CHLORIDE 9 MG/ML
INJECTION, SOLUTION INTRAVENOUS CONTINUOUS
Status: CANCELLED | OUTPATIENT
Start: 2024-06-18

## 2024-06-17 RX ORDER — DIPHENHYDRAMINE HYDROCHLORIDE 50 MG/ML
50 INJECTION INTRAMUSCULAR; INTRAVENOUS
Status: CANCELLED | OUTPATIENT
Start: 2024-06-18

## 2024-06-18 ENCOUNTER — HOSPITAL ENCOUNTER (OUTPATIENT)
Dept: INFUSION THERAPY | Age: 89
Setting detail: INFUSION SERIES
Discharge: HOME | End: 2024-06-18
Payer: MEDICARE

## 2024-06-18 VITALS
OXYGEN SATURATION: 96 % | SYSTOLIC BLOOD PRESSURE: 100 MMHG | TEMPERATURE: 97.6 F | DIASTOLIC BLOOD PRESSURE: 61 MMHG | HEART RATE: 71 BPM | RESPIRATION RATE: 18 BRPM

## 2024-06-18 DIAGNOSIS — D46.9 MDS (MYELODYSPLASTIC SYNDROME) (HCC): Primary | ICD-10-CM

## 2024-06-18 PROCEDURE — 36430 TRANSFUSION BLD/BLD COMPNT: CPT

## 2024-06-18 PROCEDURE — P9016 RBC LEUKOCYTES REDUCED: HCPCS

## 2024-06-18 PROCEDURE — 2580000003 HC RX 258: Performed by: NURSE PRACTITIONER

## 2024-06-18 PROCEDURE — 6370000000 HC RX 637 (ALT 250 FOR IP): Performed by: NURSE PRACTITIONER

## 2024-06-18 PROCEDURE — 6360000002 HC RX W HCPCS: Performed by: NURSE PRACTITIONER

## 2024-06-18 PROCEDURE — 96372 THER/PROPH/DIAG INJ SC/IM: CPT

## 2024-06-18 RX ORDER — ONDANSETRON 2 MG/ML
8 INJECTION INTRAMUSCULAR; INTRAVENOUS
Status: CANCELLED | OUTPATIENT
Start: 2024-06-18

## 2024-06-18 RX ORDER — SODIUM CHLORIDE 0.9 % (FLUSH) 0.9 %
5-40 SYRINGE (ML) INJECTION PRN
Status: DISCONTINUED | OUTPATIENT
Start: 2024-06-18 | End: 2024-06-19 | Stop reason: HOSPADM

## 2024-06-18 RX ORDER — DIPHENHYDRAMINE HYDROCHLORIDE 50 MG/ML
50 INJECTION INTRAMUSCULAR; INTRAVENOUS
Status: CANCELLED | OUTPATIENT
Start: 2024-06-18

## 2024-06-18 RX ORDER — ACETAMINOPHEN 325 MG/1
650 TABLET ORAL ONCE
Status: CANCELLED | OUTPATIENT
Start: 2024-06-18 | End: 2024-06-18

## 2024-06-18 RX ORDER — ACETAMINOPHEN 325 MG/1
650 TABLET ORAL ONCE
Status: COMPLETED | OUTPATIENT
Start: 2024-06-18 | End: 2024-06-18

## 2024-06-18 RX ORDER — ACETAMINOPHEN 325 MG/1
650 TABLET ORAL
Status: CANCELLED | OUTPATIENT
Start: 2024-06-18

## 2024-06-18 RX ORDER — SODIUM CHLORIDE 9 MG/ML
20 INJECTION, SOLUTION INTRAVENOUS CONTINUOUS
Status: DISCONTINUED | OUTPATIENT
Start: 2024-06-18 | End: 2024-06-19 | Stop reason: HOSPADM

## 2024-06-18 RX ORDER — SODIUM CHLORIDE 9 MG/ML
20 INJECTION, SOLUTION INTRAVENOUS CONTINUOUS
Status: CANCELLED | OUTPATIENT
Start: 2024-06-18

## 2024-06-18 RX ORDER — EPINEPHRINE 1 MG/ML
0.3 INJECTION, SOLUTION, CONCENTRATE INTRAVENOUS PRN
Status: CANCELLED | OUTPATIENT
Start: 2024-06-18

## 2024-06-18 RX ORDER — SODIUM CHLORIDE 9 MG/ML
25 INJECTION, SOLUTION INTRAVENOUS PRN
Status: DISCONTINUED | OUTPATIENT
Start: 2024-06-18 | End: 2024-06-19 | Stop reason: HOSPADM

## 2024-06-18 RX ORDER — ALBUTEROL SULFATE 90 UG/1
4 AEROSOL, METERED RESPIRATORY (INHALATION) PRN
Status: CANCELLED | OUTPATIENT
Start: 2024-06-18

## 2024-06-18 RX ORDER — SODIUM CHLORIDE 9 MG/ML
INJECTION, SOLUTION INTRAVENOUS CONTINUOUS
Status: CANCELLED | OUTPATIENT
Start: 2024-06-18

## 2024-06-18 RX ORDER — SODIUM CHLORIDE 9 MG/ML
25 INJECTION, SOLUTION INTRAVENOUS PRN
Status: CANCELLED | OUTPATIENT
Start: 2024-06-18

## 2024-06-18 RX ORDER — SODIUM CHLORIDE 0.9 % (FLUSH) 0.9 %
5-40 SYRINGE (ML) INJECTION PRN
Status: CANCELLED | OUTPATIENT
Start: 2024-06-18

## 2024-06-18 RX ADMIN — SODIUM CHLORIDE, PRESERVATIVE FREE 10 ML: 5 INJECTION INTRAVENOUS at 09:35

## 2024-06-18 RX ADMIN — ACETAMINOPHEN 650 MG: 325 TABLET, FILM COATED ORAL at 09:37

## 2024-06-18 RX ADMIN — SODIUM CHLORIDE 20 ML/HR: 9 INJECTION, SOLUTION INTRAVENOUS at 12:34

## 2024-06-18 RX ADMIN — HYDROCORTISONE SODIUM SUCCINATE 25 MG: 100 INJECTION, POWDER, FOR SOLUTION INTRAMUSCULAR; INTRAVENOUS at 09:55

## 2024-06-18 NOTE — PROGRESS NOTES
Informed consent from Dr. ASHOK Garcia verified and on patient chart. Patient continues to agree to treatment of blood transfusion.     Patient educated on signs and symptoms of transfusion reaction.  Patient verbalizes understanding.

## 2024-06-18 NOTE — PROGRESS NOTES
Informed consent from Dr EDWARD Garcia verified and on patient chart.   Patient received 222.17cc of PRBCs .  Patient tolerated well.    Vital signs stable.  Reviewed orally and patient and wife declined written information regarding potential delayed reaction and instructions on what to do if reaction occurs. Questions answered to apparent satisfaction. Patient was observed for 30 minutes after infusion.

## 2024-06-19 LAB
ABO/RH: NORMAL
ARM BAND NUMBER: NORMAL
BLOOD BANK DISPENSE STATUS: NORMAL
BLOOD BANK ISBT PRODUCT BLOOD TYPE: 7300
BLOOD BANK PRODUCT CODE: NORMAL
BLOOD BANK SAMPLE EXPIRATION: NORMAL
BLOOD BANK UNIT TYPE AND RH: NORMAL
BPU ID: NORMAL
COMPONENT: NORMAL
CROSSMATCH RESULT: NORMAL
TRANSFUSION STATUS: NORMAL
UNIT DIVISION: 0
UNIT ISSUE DATE/TIME: NORMAL

## 2024-06-23 ENCOUNTER — APPOINTMENT (OUTPATIENT)
Dept: GENERAL RADIOLOGY | Age: 89
DRG: 811 | End: 2024-06-23
Payer: MEDICARE

## 2024-06-23 ENCOUNTER — HOSPITAL ENCOUNTER (INPATIENT)
Age: 89
LOS: 3 days | Discharge: HOSPICE | DRG: 811 | End: 2024-06-26
Attending: EMERGENCY MEDICINE | Admitting: INTERNAL MEDICINE
Payer: MEDICARE

## 2024-06-23 DIAGNOSIS — D64.9 ANEMIA, UNSPECIFIED TYPE: Primary | ICD-10-CM

## 2024-06-23 DIAGNOSIS — I24.9 ACUTE CORONARY SYNDROME (HCC): ICD-10-CM

## 2024-06-23 DIAGNOSIS — R07.9 CHEST PAIN, UNSPECIFIED TYPE: ICD-10-CM

## 2024-06-23 DIAGNOSIS — R79.89 ELEVATED TROPONIN: ICD-10-CM

## 2024-06-23 DIAGNOSIS — J18.9 PNEUMONIA OF RIGHT LUNG DUE TO INFECTIOUS ORGANISM, UNSPECIFIED PART OF LUNG: ICD-10-CM

## 2024-06-23 LAB
ALBUMIN SERPL-MCNC: 3.5 G/DL (ref 3.5–5.2)
ALP SERPL-CCNC: 34 U/L (ref 40–129)
ALT SERPL-CCNC: 17 U/L (ref 0–40)
ANION GAP SERPL CALCULATED.3IONS-SCNC: 14 MMOL/L (ref 7–16)
AST SERPL-CCNC: 23 U/L (ref 0–39)
BASOPHILS # BLD: 0 K/UL (ref 0–0.2)
BASOPHILS NFR BLD: 0 % (ref 0–2)
BILIRUB SERPL-MCNC: 0.8 MG/DL (ref 0–1.2)
BNP SERPL-MCNC: ABNORMAL PG/ML (ref 0–450)
BUN SERPL-MCNC: 34 MG/DL (ref 6–23)
CALCIUM SERPL-MCNC: 8.3 MG/DL (ref 8.6–10.2)
CHLORIDE SERPL-SCNC: 98 MMOL/L (ref 98–107)
CHLORIDE UR-SCNC: 81 MMOL/L
CHOLEST SERPL-MCNC: 48 MG/DL
CO2 SERPL-SCNC: 22 MMOL/L (ref 22–29)
CREAT SERPL-MCNC: 2.2 MG/DL (ref 0.7–1.2)
CREAT UR-MCNC: 70.1 MG/DL (ref 40–278)
CREAT UR-MCNC: 71.4 MG/DL (ref 40–278)
EOSINOPHIL # BLD: 0 K/UL (ref 0.05–0.5)
EOSINOPHILS RELATIVE PERCENT: 0 % (ref 0–6)
ERYTHROCYTE [DISTWIDTH] IN BLOOD BY AUTOMATED COUNT: 17.5 % (ref 11.5–15)
GFR, ESTIMATED: 28 ML/MIN/1.73M2
GLUCOSE SERPL-MCNC: 103 MG/DL (ref 74–99)
HBA1C MFR BLD: 6.3 % (ref 4–5.6)
HCT VFR BLD AUTO: 17.7 % (ref 37–54)
HCT VFR BLD AUTO: 21.4 % (ref 37–54)
HCT VFR BLD AUTO: 25.9 % (ref 37–54)
HCT VFR BLD AUTO: 26 % (ref 37–54)
HDLC SERPL-MCNC: 21 MG/DL
HGB BLD-MCNC: 5.8 G/DL (ref 12.5–16.5)
HGB BLD-MCNC: 7.1 G/DL (ref 12.5–16.5)
HGB BLD-MCNC: 8.5 G/DL (ref 12.5–16.5)
HGB BLD-MCNC: 8.7 G/DL (ref 12.5–16.5)
LACTATE BLDV-SCNC: 0.9 MMOL/L (ref 0.5–1.9)
LACTATE BLDV-SCNC: 1.1 MMOL/L (ref 0.5–1.9)
LDLC SERPL CALC-MCNC: 2 MG/DL
LYMPHOCYTES NFR BLD: 8.65 K/UL (ref 1.5–4)
LYMPHOCYTES RELATIVE PERCENT: 12 % (ref 20–42)
MAGNESIUM SERPL-MCNC: 1.6 MG/DL (ref 1.6–2.6)
MCH RBC QN AUTO: 30.5 PG (ref 26–35)
MCHC RBC AUTO-ENTMCNC: 32.8 G/DL (ref 32–34.5)
MCV RBC AUTO: 93.2 FL (ref 80–99.9)
METAMYELOCYTES ABSOLUTE COUNT: 0.72 K/UL (ref 0–0.12)
METAMYELOCYTES: 1 % (ref 0–1)
MONOCYTES NFR BLD: 46.87 K/UL (ref 0.1–0.95)
MONOCYTES NFR BLD: 65 % (ref 2–12)
MYELOCYTES ABSOLUTE COUNT: 3.61 K/UL
MYELOCYTES: 5 %
NEUTROPHILS NFR BLD: 17 % (ref 43–80)
NEUTS SEG NFR BLD: 12.26 K/UL (ref 1.8–7.3)
OSMOLALITY UR: 410 MOSM/KG (ref 300–900)
PHOSPHATE SERPL-MCNC: 4.5 MG/DL (ref 2.5–4.5)
PLATELET CONFIRMATION: NORMAL
PLATELET, FLUORESCENCE: 33 K/UL (ref 130–450)
PMV BLD AUTO: 13.7 FL (ref 7–12)
POTASSIUM SERPL-SCNC: 3.2 MMOL/L (ref 3.5–5)
PROT SERPL-MCNC: 7.7 G/DL (ref 6.4–8.3)
RBC # BLD AUTO: 1.9 M/UL (ref 3.8–5.8)
RBC # BLD: ABNORMAL 10*6/UL
SODIUM SERPL-SCNC: 134 MMOL/L (ref 132–146)
SODIUM UR-SCNC: 68 MMOL/L
T4 FREE SERPL-MCNC: 1.6 NG/DL (ref 0.9–1.7)
TOTAL PROTEIN, URINE: 24 MG/DL (ref 0–12)
TRIGL SERPL-MCNC: 123 MG/DL
TROPONIN I SERPL HS-MCNC: 203 NG/L (ref 0–11)
TROPONIN I SERPL HS-MCNC: 211 NG/L (ref 0–11)
TSH SERPL DL<=0.05 MIU/L-ACNC: 5.21 UIU/ML (ref 0.27–4.2)
URINE TOTAL PROTEIN CREATININE RATIO: 0.35 (ref 0–0.2)
VLDLC SERPL CALC-MCNC: 25 MG/DL
WBC OTHER # BLD: 72.1 K/UL (ref 4.5–11.5)

## 2024-06-23 PROCEDURE — 84100 ASSAY OF PHOSPHORUS: CPT

## 2024-06-23 PROCEDURE — 6370000000 HC RX 637 (ALT 250 FOR IP): Performed by: INTERNAL MEDICINE

## 2024-06-23 PROCEDURE — 83735 ASSAY OF MAGNESIUM: CPT

## 2024-06-23 PROCEDURE — 2140000000 HC CCU INTERMEDIATE R&B

## 2024-06-23 PROCEDURE — 36430 TRANSFUSION BLD/BLD COMPNT: CPT

## 2024-06-23 PROCEDURE — 2580000003 HC RX 258: Performed by: INTERNAL MEDICINE

## 2024-06-23 PROCEDURE — 86850 RBC ANTIBODY SCREEN: CPT

## 2024-06-23 PROCEDURE — 80061 LIPID PANEL: CPT

## 2024-06-23 PROCEDURE — 99285 EMERGENCY DEPT VISIT HI MDM: CPT

## 2024-06-23 PROCEDURE — 86923 COMPATIBILITY TEST ELECTRIC: CPT

## 2024-06-23 PROCEDURE — 71045 X-RAY EXAM CHEST 1 VIEW: CPT

## 2024-06-23 PROCEDURE — 99223 1ST HOSP IP/OBS HIGH 75: CPT | Performed by: INTERNAL MEDICINE

## 2024-06-23 PROCEDURE — 82436 ASSAY OF URINE CHLORIDE: CPT

## 2024-06-23 PROCEDURE — 6360000002 HC RX W HCPCS: Performed by: INTERNAL MEDICINE

## 2024-06-23 PROCEDURE — 84300 ASSAY OF URINE SODIUM: CPT

## 2024-06-23 PROCEDURE — 84443 ASSAY THYROID STIM HORMONE: CPT

## 2024-06-23 PROCEDURE — 96374 THER/PROPH/DIAG INJ IV PUSH: CPT

## 2024-06-23 PROCEDURE — APPSS180 APP SPLIT SHARED TIME > 60 MINUTES: Performed by: NURSE PRACTITIONER

## 2024-06-23 PROCEDURE — 84439 ASSAY OF FREE THYROXINE: CPT

## 2024-06-23 PROCEDURE — 83605 ASSAY OF LACTIC ACID: CPT

## 2024-06-23 PROCEDURE — P9016 RBC LEUKOCYTES REDUCED: HCPCS

## 2024-06-23 PROCEDURE — 84156 ASSAY OF PROTEIN URINE: CPT

## 2024-06-23 PROCEDURE — 6360000002 HC RX W HCPCS: Performed by: EMERGENCY MEDICINE

## 2024-06-23 PROCEDURE — 84484 ASSAY OF TROPONIN QUANT: CPT

## 2024-06-23 PROCEDURE — 87040 BLOOD CULTURE FOR BACTERIA: CPT

## 2024-06-23 PROCEDURE — 2700000000 HC OXYGEN THERAPY PER DAY

## 2024-06-23 PROCEDURE — 83036 HEMOGLOBIN GLYCOSYLATED A1C: CPT

## 2024-06-23 PROCEDURE — 86901 BLOOD TYPING SEROLOGIC RH(D): CPT

## 2024-06-23 PROCEDURE — 85018 HEMOGLOBIN: CPT

## 2024-06-23 PROCEDURE — 83935 ASSAY OF URINE OSMOLALITY: CPT

## 2024-06-23 PROCEDURE — 6370000000 HC RX 637 (ALT 250 FOR IP): Performed by: EMERGENCY MEDICINE

## 2024-06-23 PROCEDURE — 85014 HEMATOCRIT: CPT

## 2024-06-23 PROCEDURE — 80053 COMPREHEN METABOLIC PANEL: CPT

## 2024-06-23 PROCEDURE — 85025 COMPLETE CBC W/AUTO DIFF WBC: CPT

## 2024-06-23 PROCEDURE — 93005 ELECTROCARDIOGRAM TRACING: CPT | Performed by: NURSE PRACTITIONER

## 2024-06-23 PROCEDURE — 86900 BLOOD TYPING SEROLOGIC ABO: CPT

## 2024-06-23 PROCEDURE — 2580000003 HC RX 258: Performed by: EMERGENCY MEDICINE

## 2024-06-23 PROCEDURE — 36415 COLL VENOUS BLD VENIPUNCTURE: CPT

## 2024-06-23 PROCEDURE — 83880 ASSAY OF NATRIURETIC PEPTIDE: CPT

## 2024-06-23 PROCEDURE — 6370000000 HC RX 637 (ALT 250 FOR IP): Performed by: STUDENT IN AN ORGANIZED HEALTH CARE EDUCATION/TRAINING PROGRAM

## 2024-06-23 PROCEDURE — 82570 ASSAY OF URINE CREATININE: CPT

## 2024-06-23 PROCEDURE — 30233N1 TRANSFUSION OF NONAUTOLOGOUS RED BLOOD CELLS INTO PERIPHERAL VEIN, PERCUTANEOUS APPROACH: ICD-10-PCS | Performed by: INTERNAL MEDICINE

## 2024-06-23 RX ORDER — SODIUM CHLORIDE 9 MG/ML
INJECTION, SOLUTION INTRAVENOUS PRN
Status: DISCONTINUED | OUTPATIENT
Start: 2024-06-23 | End: 2024-06-26

## 2024-06-23 RX ORDER — POLYETHYLENE GLYCOL 3350 17 G/17G
17 POWDER, FOR SOLUTION ORAL DAILY PRN
Status: DISCONTINUED | OUTPATIENT
Start: 2024-06-23 | End: 2024-06-26 | Stop reason: HOSPADM

## 2024-06-23 RX ORDER — ACETAMINOPHEN 325 MG/1
650 TABLET ORAL EVERY 6 HOURS PRN
Status: DISCONTINUED | OUTPATIENT
Start: 2024-06-23 | End: 2024-06-26 | Stop reason: HOSPADM

## 2024-06-23 RX ORDER — HYDRALAZINE HYDROCHLORIDE 20 MG/ML
10 INJECTION INTRAMUSCULAR; INTRAVENOUS EVERY 6 HOURS PRN
Status: DISCONTINUED | OUTPATIENT
Start: 2024-06-23 | End: 2024-06-26 | Stop reason: HOSPADM

## 2024-06-23 RX ORDER — ONDANSETRON 2 MG/ML
4 INJECTION INTRAMUSCULAR; INTRAVENOUS EVERY 6 HOURS PRN
Status: DISCONTINUED | OUTPATIENT
Start: 2024-06-23 | End: 2024-06-26 | Stop reason: HOSPADM

## 2024-06-23 RX ORDER — ONDANSETRON 4 MG/1
4 TABLET, ORALLY DISINTEGRATING ORAL EVERY 8 HOURS PRN
Status: DISCONTINUED | OUTPATIENT
Start: 2024-06-23 | End: 2024-06-26 | Stop reason: HOSPADM

## 2024-06-23 RX ORDER — ALPRAZOLAM 0.25 MG/1
0.25 TABLET ORAL ONCE
Status: COMPLETED | OUTPATIENT
Start: 2024-06-23 | End: 2024-06-23

## 2024-06-23 RX ORDER — LANOLIN ALCOHOL/MO/W.PET/CERES
1000 CREAM (GRAM) TOPICAL EVERY MORNING
Status: DISCONTINUED | OUTPATIENT
Start: 2024-06-23 | End: 2024-06-24

## 2024-06-23 RX ORDER — ISOSORBIDE MONONITRATE 30 MG/1
60 TABLET, EXTENDED RELEASE ORAL EVERY MORNING
Status: DISCONTINUED | OUTPATIENT
Start: 2024-06-23 | End: 2024-06-24

## 2024-06-23 RX ORDER — SODIUM CHLORIDE 9 MG/ML
INJECTION, SOLUTION INTRAVENOUS PRN
Status: DISCONTINUED | OUTPATIENT
Start: 2024-06-23 | End: 2024-06-26 | Stop reason: HOSPADM

## 2024-06-23 RX ORDER — NITROGLYCERIN 0.4 MG/1
0.4 TABLET SUBLINGUAL EVERY 5 MIN PRN
Status: DISCONTINUED | OUTPATIENT
Start: 2024-06-23 | End: 2024-06-23

## 2024-06-23 RX ORDER — SODIUM CHLORIDE 0.9 % (FLUSH) 0.9 %
5-40 SYRINGE (ML) INJECTION EVERY 12 HOURS SCHEDULED
Status: DISCONTINUED | OUTPATIENT
Start: 2024-06-23 | End: 2024-06-26 | Stop reason: HOSPADM

## 2024-06-23 RX ORDER — ALLOPURINOL 100 MG/1
300 TABLET ORAL EVERY MORNING
Status: DISCONTINUED | OUTPATIENT
Start: 2024-06-23 | End: 2024-06-24

## 2024-06-23 RX ORDER — CARVEDILOL 3.12 MG/1
3.12 TABLET ORAL 2 TIMES DAILY
Status: DISCONTINUED | OUTPATIENT
Start: 2024-06-23 | End: 2024-06-24

## 2024-06-23 RX ORDER — EZETIMIBE 10 MG/1
10 TABLET ORAL EVERY MORNING
Status: DISCONTINUED | OUTPATIENT
Start: 2024-06-23 | End: 2024-06-24

## 2024-06-23 RX ORDER — ACETAMINOPHEN 650 MG/1
650 SUPPOSITORY RECTAL EVERY 6 HOURS PRN
Status: DISCONTINUED | OUTPATIENT
Start: 2024-06-23 | End: 2024-06-26 | Stop reason: HOSPADM

## 2024-06-23 RX ORDER — POTASSIUM CHLORIDE 20 MEQ/1
40 TABLET, EXTENDED RELEASE ORAL ONCE
Status: COMPLETED | OUTPATIENT
Start: 2024-06-23 | End: 2024-06-23

## 2024-06-23 RX ORDER — FOLIC ACID 1 MG/1
500 TABLET ORAL EVERY MORNING
Status: DISCONTINUED | OUTPATIENT
Start: 2024-06-23 | End: 2024-06-26 | Stop reason: HOSPADM

## 2024-06-23 RX ORDER — DOCUSATE SODIUM 100 MG/1
100 CAPSULE, LIQUID FILLED ORAL DAILY PRN
Status: DISCONTINUED | OUTPATIENT
Start: 2024-06-23 | End: 2024-06-26 | Stop reason: HOSPADM

## 2024-06-23 RX ORDER — BENZONATATE 100 MG/1
100 CAPSULE ORAL 3 TIMES DAILY PRN
Status: DISCONTINUED | OUTPATIENT
Start: 2024-06-23 | End: 2024-06-26 | Stop reason: HOSPADM

## 2024-06-23 RX ORDER — LANOLIN ALCOHOL/MO/W.PET/CERES
3 CREAM (GRAM) TOPICAL NIGHTLY PRN
Status: DISCONTINUED | OUTPATIENT
Start: 2024-06-23 | End: 2024-06-26 | Stop reason: HOSPADM

## 2024-06-23 RX ORDER — ZOLPIDEM TARTRATE 5 MG/1
5 TABLET ORAL NIGHTLY PRN
Status: DISCONTINUED | OUTPATIENT
Start: 2024-06-23 | End: 2024-06-26 | Stop reason: HOSPADM

## 2024-06-23 RX ORDER — SODIUM CHLORIDE 0.9 % (FLUSH) 0.9 %
5-40 SYRINGE (ML) INJECTION PRN
Status: DISCONTINUED | OUTPATIENT
Start: 2024-06-23 | End: 2024-06-26 | Stop reason: HOSPADM

## 2024-06-23 RX ORDER — SODIUM BICARBONATE 650 MG/1
650 TABLET ORAL 2 TIMES DAILY
Status: DISCONTINUED | OUTPATIENT
Start: 2024-06-23 | End: 2024-06-24

## 2024-06-23 RX ORDER — TRAZODONE HYDROCHLORIDE 50 MG/1
50 TABLET ORAL NIGHTLY
Status: DISCONTINUED | OUTPATIENT
Start: 2024-06-23 | End: 2024-06-26 | Stop reason: HOSPADM

## 2024-06-23 RX ORDER — HYDROXYUREA 500 MG/1
1000 CAPSULE ORAL 2 TIMES DAILY
Status: DISCONTINUED | OUTPATIENT
Start: 2024-06-23 | End: 2024-06-24

## 2024-06-23 RX ORDER — PANTOPRAZOLE SODIUM 40 MG/1
40 TABLET, DELAYED RELEASE ORAL
Status: DISCONTINUED | OUTPATIENT
Start: 2024-06-23 | End: 2024-06-26 | Stop reason: HOSPADM

## 2024-06-23 RX ORDER — ATORVASTATIN CALCIUM 80 MG/1
80 TABLET, FILM COATED ORAL NIGHTLY
Status: DISCONTINUED | OUTPATIENT
Start: 2024-06-23 | End: 2024-06-24

## 2024-06-23 RX ORDER — CALCIUM CARBONATE 500 MG/1
500 TABLET, CHEWABLE ORAL 3 TIMES DAILY PRN
Status: DISCONTINUED | OUTPATIENT
Start: 2024-06-23 | End: 2024-06-26 | Stop reason: HOSPADM

## 2024-06-23 RX ADMIN — SODIUM BICARBONATE 650 MG: 650 TABLET ORAL at 10:03

## 2024-06-23 RX ADMIN — ALPRAZOLAM 0.25 MG: 0.25 TABLET ORAL at 19:59

## 2024-06-23 RX ADMIN — ATORVASTATIN CALCIUM 80 MG: 80 TABLET, FILM COATED ORAL at 19:58

## 2024-06-23 RX ADMIN — SODIUM CHLORIDE, PRESERVATIVE FREE 10 ML: 5 INJECTION INTRAVENOUS at 10:04

## 2024-06-23 RX ADMIN — SODIUM CHLORIDE, PRESERVATIVE FREE 10 ML: 5 INJECTION INTRAVENOUS at 20:01

## 2024-06-23 RX ADMIN — CARVEDILOL 3.12 MG: 3.12 TABLET, FILM COATED ORAL at 19:59

## 2024-06-23 RX ADMIN — POTASSIUM CHLORIDE 40 MEQ: 1500 TABLET, EXTENDED RELEASE ORAL at 03:44

## 2024-06-23 RX ADMIN — VANCOMYCIN HYDROCHLORIDE 1750 MG: 10 INJECTION, POWDER, LYOPHILIZED, FOR SOLUTION INTRAVENOUS at 04:54

## 2024-06-23 RX ADMIN — Medication 500 MCG: at 10:04

## 2024-06-23 RX ADMIN — TRAZODONE HYDROCHLORIDE 50 MG: 50 TABLET ORAL at 19:58

## 2024-06-23 RX ADMIN — ISOSORBIDE MONONITRATE 60 MG: 30 TABLET, EXTENDED RELEASE ORAL at 10:02

## 2024-06-23 RX ADMIN — CEFEPIME 2000 MG: 2 INJECTION, POWDER, FOR SOLUTION INTRAVENOUS at 03:43

## 2024-06-23 RX ADMIN — CYANOCOBALAMIN TAB 1000 MCG 1000 MCG: 1000 TAB at 10:03

## 2024-06-23 RX ADMIN — ALLOPURINOL 300 MG: 100 TABLET ORAL at 10:03

## 2024-06-23 RX ADMIN — SODIUM BICARBONATE 650 MG: 650 TABLET ORAL at 19:59

## 2024-06-23 RX ADMIN — EZETIMIBE 10 MG: 10 TABLET ORAL at 11:53

## 2024-06-23 RX ADMIN — CEFEPIME 1000 MG: 1 INJECTION, POWDER, FOR SOLUTION INTRAMUSCULAR; INTRAVENOUS at 15:32

## 2024-06-23 RX ADMIN — CARVEDILOL 3.12 MG: 3.12 TABLET, FILM COATED ORAL at 10:03

## 2024-06-23 RX ADMIN — POTASSIUM CHLORIDE: 2 INJECTION, SOLUTION, CONCENTRATE INTRAVENOUS at 15:33

## 2024-06-23 RX ADMIN — NITROGLYCERIN 0.5 INCH: 20 OINTMENT TOPICAL at 03:11

## 2024-06-23 ASSESSMENT — PAIN SCALES - GENERAL
PAINLEVEL_OUTOF10: 2
PAINLEVEL_OUTOF10: 4

## 2024-06-23 ASSESSMENT — PAIN DESCRIPTION - LOCATION: LOCATION: GENERALIZED

## 2024-06-23 NOTE — PATIENT CARE CONFERENCE
P Quality Flow/Interdisciplinary Rounds Progress Note        Quality Flow Rounds held on June 23, 2024    Disciplines Attending:  Bedside Nurse and Nursing Unit Leadership    Frederick Santiago was admitted on 6/23/2024  2:17 AM    Anticipated Discharge Date:       Disposition:    Ambrose Score:       Readmission Risk              Risk of Unplanned Readmission:  31           Discussed patient goal for the day, patient clinical progression, and barriers to discharge.  The following Goal(s) of the Day/Commitment(s) have been identified:  Labs - Report Results and make sure consults called out      Cathy Anaya RN  June 23, 2024

## 2024-06-23 NOTE — PROGRESS NOTES
4 Eyes Skin Assessment     NAME:  Frederick Santiago  YOB: 1935  MEDICAL RECORD NUMBER:  [id number]    The patient is being assessed for  Admission    I agree that at least one RN has performed a thorough Head to Toe Skin Assessment on the patient. ALL assessment sites listed below have been assessed.      Areas assessed by both nurses:    Head, Face, Ears, Shoulders, Back, Chest, Arms, Elbows, Hands, Sacrum. Buttock, Coccyx, Ischium, and Legs. Feet and Heels        Does the Patient have a Wound? No noted wound(s)       Ambrose Prevention initiated by RN: Yes  Wound Care Orders initiated by RN: No    Pressure Injury (Stage 3,4, Unstageable, DTI, NWPT, and Complex wounds) if present, place Wound referral order by RN under : No    New Ostomies, if present place, Ostomy referral order under : No     Nurse 1 eSignature: Electronically signed by Favio Navarro RN on 6/23/24 at 5:03 PM EDT    **SHARE this note so that the co-signing nurse can place an eSignature**    Nurse 2 eSignature: Electronically signed by Nan Singer RN on 6/23/24 at 6:47 PM EDT

## 2024-06-23 NOTE — PLAN OF CARE
Brief internal medicine progress note:     Patient seen and examined, H&P and billing done on this calendar day by admitting service.  Agree with assessment and plan.  Chest pain, elevated troponin, cardiology consulted appreciate recommendations.  Severe anemia, transfuse if less than 8 in the setting of elevated troponins  Hemoglobin 5.8 on admission, repeat 7.1, will transfuse another unit.  Continuing Vanco and cefepime.    Electronically signed by Savannah Wagner MD on 6/23/2024 at 10:03 AM

## 2024-06-23 NOTE — CONSULTS
[facility]  Hematology/Oncology  Consult  Dr. Mao Kelley M.D.    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  Came to the ER complaining of worsening dyspnea on exertion.  Labs on admission were remarkable for a white cell count of 72,000, hemoglobin of 5, platelets 33,000.  Differential showed 46% monocytes.  proBNP 11,000.  Troponins 203.  Cardiology consulted for chest pain shortness of breath.  No intervention.  Symptoms due to demand ischemia.  No bleeding bruising.  No fever or chills.  Chest x-ray showed right basilar airspace opacities and pleural effusion.    Review of systems: Over 10 systems were reviewed and all were negative except as mention above.    Diagnostic Data:     Past Medical History:   Diagnosis Date    Anal fissure 10/27/2016    Anemia     Arthritis     CAD (coronary artery disease)     Diabetes mellitus (HCC)     ADULT ONSET    GERD (gastroesophageal reflux disease) 10/27/2016    Gout     History of blood transfusion     Hyperkalemia     Hyperlipidemia     Low back pain 07/21/2017    Low grade myelodysplastic syndrome lesions ([facility]) 06/13/2017    OA (osteoarthritis) 10/27/2016    Pain of left lower extremity 07/21/2017    Rectal fissure 1987       Patient Active Problem List    Diagnosis Date Noted    Stage 3a chronic kidney disease (CKD) ([facility]) 02/20/2023    Influenzal pneumonia 12/03/2022    Acute on chronic anemia 06/23/2024    CHARANJIT (acute kidney injury) ([facility]) 06/03/2024    Ischemic cardiomyopathy 06/03/2024     Paying Living Expenses: Not hard at all   Food Insecurity: No Food Insecurity (6/3/2024)    Hunger Vital Sign     Worried About Running Out of Food in the Last Year: Never true     Ran Out of Food in the Last Year: Never true   Transportation Needs: No Transportation Needs (6/3/2024)    PRAPARE - Transportation     Lack of Transportation (Medical): No     Lack of Transportation (Non-Medical): No   Physical Activity: Sufficiently Active (8/22/2022)    Exercise Vital Sign     Days of Exercise per Week: 5 days     Minutes of Exercise per Session: 90 min   Stress: Not on file   Social Connections: Not on file   Intimate Partner Violence: Not on file   Housing Stability: Low Risk  (6/3/2024)    Housing Stability Vital Sign     Unable to Pay for Housing in the Last Year: No     Number of Places Lived in the Last Year: 1     Unstable Housing in the Last Year: No        TOBACCO:   reports that he has never smoked. He has never been exposed to tobacco smoke. He has never used smokeless tobacco.  ETOH:   reports current alcohol use.    Home Medications  Prior to Admission medications    Medication Sig Start Date End Date Taking? Authorizing Provider   hydroxyurea (HYDREA) 500 MG chemo capsule Take 2 capsules by mouth 2 times daily for 56 doses 6/5/24 7/3/24  Stefano Kennedy DO   alirocumab (PRALUENT) 75 MG/ML SOAJ injection pen Inject 1 mL into the skin every 14 days *SUNDAYS*  LAST DOSE: 05/26/2024  NEXT DOSE DUE: 06/09/2024    Antionette Yepez MD   allopurinol (ZYLOPRIM) 300 MG tablet Take 1 tablet by mouth every morning    Antionette Yepez MD   carvedilol (COREG) 3.125 MG tablet Take 1 tablet by mouth 2 times daily    Antionette Yepez MD   ezetimibe (ZETIA) 10 MG tablet Take 1 tablet by mouth every morning    Antionette Yepez MD   isosorbide mononitrate (IMDUR) 30 MG extended release tablet Take 1 tablet by mouth every morning    Antionette Yepez MD   nateglinide (STARLIX) 120 MG tablet Take 1

## 2024-06-23 NOTE — PROGRESS NOTES
Pharmacy Consultation Note  (Antibiotic Dosing and Monitoring)    Initial consult date: 6/23/24  Consulting physician/provider: Dr. Monahan  Drug: Vancomycin  Indication: Pneumonia    Age/  Gender Height Weight IBW  Allergy Information   88 y.o./male 167.6 cm (5' 6\") 73.5 kg (162 lb)     Ideal body weight: 63.8 kg (140 lb 10.5 oz)  Adjusted ideal body weight: 67.7 kg (149 lb 3.1 oz)   Altace [ramipril], Benadryl [diphenhydramine], and Iodinated contrast media      Renal Function:  Recent Labs     06/23/24  0215   BUN 34*   CREATININE 2.2*       Intake/Output Summary (Last 24 hours) at 6/23/2024 0560  Last data filed at 6/23/2024 0413  Gross per 24 hour   Intake 100 ml   Output --   Net 100 ml       Vancomycin Monitoring:  Trough:  No results for input(s): \"VANCOTROUGH\" in the last 72 hours.  Random:  No results for input(s): \"VANCORANDOM\" in the last 72 hours.    Vancomycin Administration Times:  Recent vancomycin administrations                     vancomycin (VANCOCIN) 1,750 mg in sodium chloride 0.9 % 500 mL IVPB (mg) 1,750 mg New Bag 06/23/24 0840                    Assessment:  Patient is a 88 y.o. male who has been initiated on vancomycin  Estimated Creatinine Clearance: 22 mL/min (A) (based on SCr of 2.2 mg/dL (H)).  To dose vancomycin, pharmacy will be utilizing dosing based off of levels because of patient's renal impairment/insufficiency    Plan:  Vancomycin 1750mg x 1   Random level tomorrow AM   Will continue to monitor renal function   Pharmacy to follow      Cortney Aparicio, PharmD, BCPS, BCCCP 6/23/2024 5:50 AM

## 2024-06-23 NOTE — H&P
Inpatient H&P      PCP:  Wilbert Arcos DO  Admitting Physician:  Yumiko Monahan DO  Consultants:  Cardiology, oncology, nephrology  Chief Complaint:    Chief Complaint   Patient presents with    Shortness of Breath     (CP, weakness, SOB started last night around 9pm, wife gave patient 1 nitro, patient also has bilateral feet edema that started last week, patient states he took an extra lasix)       History of Present Illness  Frederick Santiago is a 88 y.o. male who presents to [facility] ER complaining of SOB, CP, weakness.    Frederick Santiago has a past medical history that includes coronary artery disease, AML, CKD, mild VHD, HFpEF, hypertension, diabetes, hyperlipidemia, PAD, GERD, gout, BPH    Frederick presents to ER for shortness of breath, chest pressure.  This started a few hours prior to presentation to the ER.  This is worse with exertion.  Shortness of breath has been an ongoing issue but is worsened over the past several days.  He does admit to some dry cough.  He started having chest pressure around 9 PM and it does not radiate.  He took a nitro at home along with aspirin.  He states he took an extra dose of his diuretic at home.  He has a hemoglobin of 5.8 and platelets of 33.  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 Garcia.  Trop elevated at 203 and BNP 11,590, both higher than baseline. He recently was admitted 2 weeks ago for chest pain and anemia also.  Cardiology was consulted at that time and believes that this was likely demand ischemia from anemia.  He will be admitted with consultation to cardiology and oncology.   Discussed patient's case with ED physician.    ER Course  Upon presentation to the ER, routine labwork was performed which revealed potassium 3.2, BUN 34, creatinine 2.2, proBNP 11,590, troponin 203, WBC 72.1, hemoglobin 5.8, platelets 33.  Imaging results are as outlined below in the Imaging section of this note.     Upon arrival to the ER, patient was 132/66.  The patient

## 2024-06-23 NOTE — CONSULTS
[facility]              [address] [place] [address]                              CONSULTATION      PATIENT NAME: BRANDYN BROWN               : 1935  MED REC NO: [id number]                        ROOM: 6512  ACCOUNT NO: [id number]                       ADMIT DATE: 2024  PROVIDER: Angel David MD      REASON FOR CONSULTATION:  Acute kidney injury.    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  The patient has a history of diabetes, non-insulin-dependent; coronary artery disease; AML, receiving chemotherapy, followup with Dr. Kwok; hypertension; and hyperlipidemia.    He presented to the ED with chief complaint of shortness of breath.    He was found to have a profound anemia.    I did see him this morning.  He is sitting in a chair.  He had been euvolemic.  His blood pressure is stable.  He is receiving RBC transfusion   No nausea.  No vomiting.  No diarrhea.  He admits to poor p.o. intake.    REVIEW OF SYSTEMS:  Twelve-point done, negative except for those mentioned above.    ALLERGIES:  BENADRYL AND IODINE DYE.      PAST MEDICAL HISTORY:  Coronary artery disease, diabetes, hyperlipidemia, and hypertension.    PAST SURGICAL HISTORY:  Colonoscopy.    FAMILY HISTORY:  Reviewed, noncontributory.    SOCIAL HISTORY:  No smoking.  No drinking.    MEDICATIONS AT HOME:  Hydroxyurea, and carvedilol.    PHYSICAL EXAMINATION:  VITAL SIGNS:  Blood pressure 142/78.  GENERAL:  He is in mild-to-moderate distress.  HEAD:  Atraumatic and normocephalic.  NECK:  Supple, symmetrical.  EYES:  Pupils are round and reactive to light bilaterally.  HEART:  Normal rate.  ABDOMEN:  Soft and nontender.  Bowel sounds active.  No organomegaly.  LUNGS:  Decreased breath sounds at bases.  SKIN:  Dry.  PSYCHIATRIC:  Cooperative.    BLOOD WORK:  Creatinine 2.2.    ASSESSMENT:

## 2024-06-23 NOTE — PLAN OF CARE

## 2024-06-23 NOTE — PROGRESS NOTES
Floor called requesting h/h be drawn prior to pt going to floor- labs drawn and sent- awaiting transport. Pt is awake and alert c/o feeling exhausted.

## 2024-06-23 NOTE — CONSULTS
Inpatient Cardiology Consultation      Reason for Consult: Chest pain, shortness of breath, worsening troponin and BNP, anemia    Consulting Physician:     Requesting Physician:  Yumiko Monahan    Date of Consultation: 6/23/2024    HISTORY OF PRESENT ILLNESS:   Frederick Santiago  is a 88 y.o.  male known to Dr. Saini last OV 5/16/2024 for CAD, HTN, Chronic HFpEF with history of multiple PCI's.    PMH: see below  Presented to the emergency department for complaint of shortness of breath as well as chest pressure that started hours ago.  Patient reported the complaint of been persistent moderate in severity worsened by light exertion patient reported having JANE but is worsened over the last several days especially in the last several hours prior to arrival.  Patient also reported dry cough for the past 5 days patient had chest pain at home prior to arrival he did take nitro at home.  Patient was placed on oxygen by EMS pulse ox 91% on room air.  Does not wear oxygen at home.  Patient also reported taking extra dose of his diuretic  ED > 6/23/2024, 209, via EMS complaint of chest pain  Arrival vitals: T98.5, RR 12, P88, /66, SpO2 91% on room air.  Significant Labs:   Lab Results   Component Value Date     06/23/2024    K 3.2 (L) 06/23/2024    CL 98 06/23/2024    CO2 22 06/23/2024    BUN 34 (H) 06/23/2024    CREATININE 2.2 (H) 06/23/2024    GLUCOSE 103 (H) 06/23/2024    CALCIUM 8.3 (L) 06/23/2024    BILITOT 0.8 06/23/2024    ALKPHOS 34 (L) 06/23/2024    AST 23 06/23/2024    ALT 17 06/23/2024    LABGLOM 28 (L) 06/23/2024    GFRAA 58 10/11/2022       Lab Results   Component Value Date    WBC 72.1 (H) 06/23/2024    HGB 7.1 (L) 06/23/2024    HCT 21.4 (L) 06/23/2024    MCV 93.2 06/23/2024     03/05/2024   Hemoglobin 5.8 on arrival   proBNP 11,590  Troponin 203/211  Hemoglobin A1c 6.3  Mag 1.6  TSH 5.21  RAD:   CXR: Right basilar airspace opacities with small right pleural effusion.   prior to admission as well. Denies fever/chills. Denies n/v/d. Denies exertional chest pain or shortness of breath. Denies hx of MI, stroke/tia, or clots.      Please note: past medical records were reviewed per electronic medical record (EMR) - see detailed reports under Past Medical/ Surgical History.   Past Medical History:    Chronic heart failure with preserved ejection fraction (HFpEF) ([facility]) -  EF 60%.    Coronary artery disease of native coronaries without angina -on aspirin, Coreg, Altace, and Statin + PCSK-9 inhibitor (added due to LDL 81 on maximal tolerable Statin/Zetia). Off Jardiance due to UTI.   - S/p PTCA of Proximal and Mid LAD in Jan 1989 at [facility].   - S/p Repeat PTCA of LAD in April 1989 at [facility].  - S/p Rotational atherectomy and 2.25-mm/16-mm Scimed Ion DOUG to Mid LAD, Lengthy procedure and also PTCA of ostial Diagonal branch through LAD stent - [facility] on 9/22/2011  - S/p 3.0-mm/28-mm Xience Abbott DOUG to proximal RCA.  Also there is 50% Mid RCA, 50% distal RCA, Prox ISA 20% stenosis, Small caliber superior branch of PDA ostial 70%, larger branch of PDA ostial 50% stenosis - [facility] on 10/9/2011.  - Coronary CTA 7/20//2015 due to JANE/Angina equivalent; showed high grade RCA stenosis; Sub-optimal study due to calcified coronaries.  - S/p 3.5-mm x 18-mm Alpine-RX Abbott DOUG to distal RCA.  Also there is Prox LAD 50%; Mild LAD two areas-mid and distal edge in-stent restenosis, 70% OM1 stenosis; calcified coronaries, [facility] on 12/3/2015.   SOBOE (shortness of breath on exertion) - Mild, chronic. Multifactorial (CHF, Anemia, diastolic dysfunction).  Leukocytosis - Follows with Dr Garcia, Hem/Onc - On Chemo   Chronic Anemia - s/p PRBC as needed -  Follows with Dr Garcia Hem/Onc   CHARANJIT/Chronic kidney disease, unspecified CKD stage   Insomnia, unspecified type -Ambien as needed  Hx of IVP contrast-Iodine allergy, He had no issues with premedicating (Steroids, Benadryl, and H2

## 2024-06-23 NOTE — PROGRESS NOTES
Antibiotic Extended Infusion Policy     This patient is on medication that requires renal, weight, and/or indication dose adjustment.      Date Body Weight IBW  Adjusted BW SCr  CrCl Dialysis status BMI   6/23/2024 73.5 kg (162 lb) Ideal body weight: 63.8 kg (140 lb 10.5 oz)  Adjusted ideal body weight: 67.7 kg (149 lb 3.1 oz) Serum creatinine: 2.2 mg/dL (H) 06/23/24 0215  Estimated creatinine clearance: 22 mL/min (A) N/a Body mass index is 26.15 kg/m².       Pharmacy has dose-adjusted the following medication(s):    Ordered Medication: Cefepime 2000mg q12h     Order Changed/converted to: Cefepime 1000mg q12h    These changes were made per protocol according to the [facility]   Automatic Extended Infusion Dose Adjustment Policy.     *Please note this dose may need readjusted if patient's condition changes.    Please contact pharmacy with any questions regarding these changes.    Elly Castellanos RPH  6/23/2024  4:08 AM

## 2024-06-23 NOTE — ED PROVIDER NOTES
HPI:  6/23/24, Time: 2:10 AM EDT         Frederick Santiago is a 88 y.o. male history of anal fissure history of anemia coronary disease diabetes acid reflux hyperkalemia hyperlipidemia history of low back pain history of myelodysplastic syndrome osteoarthritis presenting to the ED for shortness of breath as well as chest pressure, beginning hours ago.  The complaint has been persistent, moderate in severity, and worsened by light exertion.  Patient reporting shortness of breath that is an ongoing issue but reports it is worsened over the last several days especially this past several hours.  Patient reporting some dry cough for 5 days.  Patient reporting no fever no chills he reports orthopnea.  Patient reporting also having chest pressure around 9 PM that does not radiate.  Patient reports he did take nitro at home.  Patient does take aspirin at home as well.  Patient was placed on oxygen by EMS pulse ox 91% on room air.  Patient does not wear oxygen at home.  Patient does report improvement of symptoms.  Patient reporting no syncopal event he reports no black or tarry stools he reports no hematemesis he reports recent transfusion.  Patient also reports he did take an extra dose of his diuretic as well.    ROS:   Pertinent positives and negatives are stated within HPI, all other systems reviewed and are negative.  --------------------------------------------- PAST HISTORY ---------------------------------------------  Past Medical History:  has a past medical history of Anal fissure, Anemia, Arthritis, CAD (coronary artery disease), Diabetes mellitus (HCC), GERD (gastroesophageal reflux disease), Gout, History of blood transfusion, Hyperkalemia, Hyperlipidemia, Low back pain, Low grade myelodysplastic syndrome lesions (HCC), OA (osteoarthritis), Pain of left lower extremity, and Rectal fissure.    Past Surgical History:  has a past surgical history that includes Diagnostic Cardiac Cath Lab Procedure (01/11/89);  Diagnostic Cardiac Cath Lab Procedure (01/16/89); Diagnostic Cardiac Cath Lab Procedure (09/22/11); Coronary angioplasty (01/16/89); Coronary angioplasty (04/12/89); Coronary angioplasty (09/22/11); Coronary angioplasty (10/19/11); NM Tumor Localization Spect Multi Day (08/15/11); Total knee arthroplasty (10/99); Colonoscopy; Endoscopy, colon, diagnostic; Coronary angioplasty with stent (12/3/15); joint replacement (1999); lumbar laminectomy (09/08/2017); Upper gastrointestinal endoscopy (09/26/2017); Cholecystectomy, laparoscopic (04/10/2018); and IR BIOPSY SUPERFICIAL BONE (10/27/2023).    Social History:  reports that he has never smoked. He has never been exposed to tobacco smoke. He has never used smokeless tobacco. He reports current alcohol use. He reports that he does not use drugs.    Family History: family history includes Diabetes in his brother; Heart Disease in his brother; Parkinsonism in his brother.     The patient’s home medications have been reviewed.    Allergies: Altace [ramipril], Benadryl [diphenhydramine], and Iodinated contrast media    ---------------------------------------------------PHYSICAL EXAM--------------------------------------    Constitutional/General: Alert and oriented x3, well appearing, non toxic in NAD  Head: Normocephalic and atraumatic  Eyes: PERRL, EOMI  Mouth: Oropharynx clear, handling secretions, no trismus  Neck: Supple, full ROM, non tender to palpation in the midline, no stridor, no crepitus, no meningeal signs  Pulmonary: Lungs clear to auscultation bilaterally, no wheezes, rales, or rhonchi. Not in respiratory distress  Cardiovascular:  Regular rate. Regular rhythm. No murmurs, gallops, or rubs. 2+ distal pulses  Chest: no chest wall tenderness  Abdomen: Soft.  Non tender. Non distended.  +BS.  No rebound, guarding, or rigidity. No pulsatile masses appreciated.  Musculoskeletal: Moves all extremities x 4. Warm and well perfused, no clubbing, cyanosis, edema    sodium chloride (OCEAN, BABY AYR) 0.65 % nasal spray 1 spray (has no administration in time range)   sodium chloride flush 0.9 % injection 5-40 mL (has no administration in time range)   sodium chloride flush 0.9 % injection 5-40 mL (has no administration in time range)   0.9 % sodium chloride infusion (has no administration in time range)   ondansetron (ZOFRAN-ODT) disintegrating tablet 4 mg (has no administration in time range)     Or   ondansetron (ZOFRAN) injection 4 mg (has no administration in time range)   polyethylene glycol (GLYCOLAX) packet 17 g (has no administration in time range)   acetaminophen (TYLENOL) tablet 650 mg (has no administration in time range)     Or   acetaminophen (TYLENOL) suppository 650 mg (has no administration in time range)   allopurinol (ZYLOPRIM) tablet 300 mg (has no administration in time range)   atorvastatin (LIPITOR) tablet 80 mg (has no administration in time range)   carvedilol (COREG) tablet 3.125 mg (has no administration in time range)   docusate sodium (COLACE) capsule 100 mg (has no administration in time range)   ezetimibe (ZETIA) tablet 10 mg (has no administration in time range)   folic acid (FOLVITE) tablet 500 mcg (has no administration in time range)   isosorbide mononitrate (IMDUR) extended release tablet 60 mg (has no administration in time range)   pantoprazole (PROTONIX) tablet 40 mg (has no administration in time range)   sodium bicarbonate tablet 650 mg (has no administration in time range)   traZODone (DESYREL) tablet 50 mg (has no administration in time range)   vitamin B-12 (CYANOCOBALAMIN) tablet 1,000 mcg (has no administration in time range)   zolpidem (AMBIEN) tablet 5 mg (has no administration in time range)   hydroxyurea (HYDREA) chemo capsule 1,000 mg (has no administration in time range)   cefepime (MAXIPIME) 1,000 mg in sodium chloride 0.9 % 50 mL IVPB ([id number]) (has no administration in time range)   vancomycin (VANCOCIN) 1,750 mg in sodium

## 2024-06-24 LAB
ABO/RH: NORMAL
ALBUMIN SERPL-MCNC: 3.6 G/DL (ref 3.5–5.2)
ALP SERPL-CCNC: 37 U/L (ref 40–129)
ALT SERPL-CCNC: 17 U/L (ref 0–40)
ANION GAP SERPL CALCULATED.3IONS-SCNC: 17 MMOL/L (ref 7–16)
ANTIBODY SCREEN: NEGATIVE
ARM BAND NUMBER: NORMAL
AST SERPL-CCNC: 26 U/L (ref 0–39)
B-OH-BUTYR SERPL-MCNC: 1.51 MMOL/L (ref 0.02–0.27)
BILIRUB SERPL-MCNC: 1.1 MG/DL (ref 0–1.2)
BLOOD BANK BLOOD PRODUCT EXPIRATION DATE: NORMAL
BLOOD BANK BLOOD PRODUCT EXPIRATION DATE: NORMAL
BLOOD BANK DISPENSE STATUS: NORMAL
BLOOD BANK DISPENSE STATUS: NORMAL
BLOOD BANK ISBT PRODUCT BLOOD TYPE: 7300
BLOOD BANK ISBT PRODUCT BLOOD TYPE: 7300
BLOOD BANK PRODUCT CODE: NORMAL
BLOOD BANK PRODUCT CODE: NORMAL
BLOOD BANK SAMPLE EXPIRATION: NORMAL
BLOOD BANK UNIT TYPE AND RH: NORMAL
BLOOD BANK UNIT TYPE AND RH: NORMAL
BPU ID: NORMAL
BPU ID: NORMAL
BUN SERPL-MCNC: 32 MG/DL (ref 6–23)
CALCIUM SERPL-MCNC: 8.8 MG/DL (ref 8.6–10.2)
CHLORIDE SERPL-SCNC: 101 MMOL/L (ref 98–107)
CO2 SERPL-SCNC: 17 MMOL/L (ref 22–29)
COMPONENT: NORMAL
COMPONENT: NORMAL
CREAT SERPL-MCNC: 1.7 MG/DL (ref 0.7–1.2)
CROSSMATCH RESULT: NORMAL
CROSSMATCH RESULT: NORMAL
DATE LAST DOSE: NORMAL
ERYTHROCYTE [DISTWIDTH] IN BLOOD BY AUTOMATED COUNT: 19.1 % (ref 11.5–15)
GFR, ESTIMATED: 39 ML/MIN/1.73M2
GLUCOSE BLD-MCNC: 199 MG/DL (ref 74–99)
GLUCOSE BLD-MCNC: 227 MG/DL (ref 74–99)
GLUCOSE SERPL-MCNC: 156 MG/DL (ref 74–99)
HCT VFR BLD AUTO: 24.9 % (ref 37–54)
HCT VFR BLD AUTO: 25 % (ref 37–54)
HCT VFR BLD AUTO: 26 % (ref 37–54)
HGB BLD-MCNC: 8.3 G/DL (ref 12.5–16.5)
HGB BLD-MCNC: 8.3 G/DL (ref 12.5–16.5)
HGB BLD-MCNC: 8.7 G/DL (ref 12.5–16.5)
LACTATE BLDV-SCNC: 1.6 MMOL/L (ref 0.5–2.2)
MCH RBC QN AUTO: 30.3 PG (ref 26–35)
MCHC RBC AUTO-ENTMCNC: 33.5 G/DL (ref 32–34.5)
MCV RBC AUTO: 90.6 FL (ref 80–99.9)
PLATELET CONFIRMATION: NORMAL
PLATELET, FLUORESCENCE: 27 K/UL (ref 130–450)
PMV BLD AUTO: 10.1 FL (ref 7–12)
POTASSIUM SERPL-SCNC: 3.4 MMOL/L (ref 3.5–5)
PROT SERPL-MCNC: 7.8 G/DL (ref 6.4–8.3)
RBC # BLD AUTO: 2.87 M/UL (ref 3.8–5.8)
SODIUM SERPL-SCNC: 135 MMOL/L (ref 132–146)
TME LAST DOSE: NORMAL H
TRANSFUSION STATUS: NORMAL
TRANSFUSION STATUS: NORMAL
UNIT DIVISION: 0
UNIT DIVISION: 0
UNIT ISSUE DATE/TIME: NORMAL
UNIT ISSUE DATE/TIME: NORMAL
VANCOMYCIN DOSE: NORMAL MG
VANCOMYCIN SERPL-MCNC: 9.9 UG/ML (ref 5–40)
WBC OTHER # BLD: 99.8 K/UL (ref 4.5–11.5)

## 2024-06-24 PROCEDURE — 2140000000 HC CCU INTERMEDIATE R&B

## 2024-06-24 PROCEDURE — 80053 COMPREHEN METABOLIC PANEL: CPT

## 2024-06-24 PROCEDURE — 2700000000 HC OXYGEN THERAPY PER DAY

## 2024-06-24 PROCEDURE — 2580000003 HC RX 258

## 2024-06-24 PROCEDURE — 36415 COLL VENOUS BLD VENIPUNCTURE: CPT

## 2024-06-24 PROCEDURE — 2580000003 HC RX 258: Performed by: INTERNAL MEDICINE

## 2024-06-24 PROCEDURE — 99233 SBSQ HOSP IP/OBS HIGH 50: CPT | Performed by: INTERNAL MEDICINE

## 2024-06-24 PROCEDURE — 6360000002 HC RX W HCPCS: Performed by: INTERNAL MEDICINE

## 2024-06-24 PROCEDURE — 83605 ASSAY OF LACTIC ACID: CPT

## 2024-06-24 PROCEDURE — 94640 AIRWAY INHALATION TREATMENT: CPT

## 2024-06-24 PROCEDURE — 80202 ASSAY OF VANCOMYCIN: CPT

## 2024-06-24 PROCEDURE — 6370000000 HC RX 637 (ALT 250 FOR IP): Performed by: INTERNAL MEDICINE

## 2024-06-24 PROCEDURE — 6370000000 HC RX 637 (ALT 250 FOR IP)

## 2024-06-24 PROCEDURE — 85027 COMPLETE CBC AUTOMATED: CPT

## 2024-06-24 PROCEDURE — 94664 DEMO&/EVAL PT USE INHALER: CPT

## 2024-06-24 PROCEDURE — 85014 HEMATOCRIT: CPT

## 2024-06-24 PROCEDURE — 6360000002 HC RX W HCPCS

## 2024-06-24 PROCEDURE — 82962 GLUCOSE BLOOD TEST: CPT

## 2024-06-24 PROCEDURE — 85018 HEMOGLOBIN: CPT

## 2024-06-24 PROCEDURE — 99232 SBSQ HOSP IP/OBS MODERATE 35: CPT | Performed by: INTERNAL MEDICINE

## 2024-06-24 PROCEDURE — 99222 1ST HOSP IP/OBS MODERATE 55: CPT

## 2024-06-24 PROCEDURE — 82010 KETONE BODYS QUAN: CPT

## 2024-06-24 RX ORDER — IPRATROPIUM BROMIDE AND ALBUTEROL SULFATE 2.5; .5 MG/3ML; MG/3ML
1 SOLUTION RESPIRATORY (INHALATION)
Status: DISCONTINUED | OUTPATIENT
Start: 2024-06-24 | End: 2024-06-26 | Stop reason: HOSPADM

## 2024-06-24 RX ORDER — BUDESONIDE 0.5 MG/2ML
0.5 INHALANT ORAL
Status: DISCONTINUED | OUTPATIENT
Start: 2024-06-24 | End: 2024-06-26 | Stop reason: HOSPADM

## 2024-06-24 RX ORDER — MORPHINE SULFATE 20 MG/ML
5 SOLUTION ORAL EVERY 4 HOURS PRN
Status: DISCONTINUED | OUTPATIENT
Start: 2024-06-24 | End: 2024-06-26

## 2024-06-24 RX ORDER — ISOSORBIDE MONONITRATE 30 MG/1
60 TABLET, EXTENDED RELEASE ORAL DAILY
Status: DISCONTINUED | OUTPATIENT
Start: 2024-06-25 | End: 2024-06-26 | Stop reason: HOSPADM

## 2024-06-24 RX ORDER — CARVEDILOL 25 MG/1
25 TABLET ORAL 2 TIMES DAILY WITH MEALS
Status: DISCONTINUED | OUTPATIENT
Start: 2024-06-25 | End: 2024-06-26 | Stop reason: HOSPADM

## 2024-06-24 RX ORDER — SODIUM BICARBONATE 650 MG/1
650 TABLET ORAL 3 TIMES DAILY
Status: DISCONTINUED | OUTPATIENT
Start: 2024-06-24 | End: 2024-06-26 | Stop reason: HOSPADM

## 2024-06-24 RX ORDER — NITROGLYCERIN 0.4 MG/1
0.4 TABLET SUBLINGUAL EVERY 5 MIN PRN
Status: DISCONTINUED | OUTPATIENT
Start: 2024-06-24 | End: 2024-06-26 | Stop reason: HOSPADM

## 2024-06-24 RX ORDER — LORAZEPAM 2 MG/ML
1 CONCENTRATE ORAL
Status: DISCONTINUED | OUTPATIENT
Start: 2024-06-24 | End: 2024-06-26 | Stop reason: HOSPADM

## 2024-06-24 RX ORDER — GLYCOPYRROLATE 0.2 MG/ML
0.2 INJECTION INTRAMUSCULAR; INTRAVENOUS EVERY 4 HOURS PRN
Status: DISCONTINUED | OUTPATIENT
Start: 2024-06-24 | End: 2024-06-26 | Stop reason: HOSPADM

## 2024-06-24 RX ORDER — POTASSIUM CHLORIDE 20 MEQ/1
20 TABLET, EXTENDED RELEASE ORAL ONCE
Status: COMPLETED | OUTPATIENT
Start: 2024-06-24 | End: 2024-06-24

## 2024-06-24 RX ORDER — FUROSEMIDE 20 MG/1
20 TABLET ORAL DAILY
Status: DISCONTINUED | OUTPATIENT
Start: 2024-06-25 | End: 2024-06-26 | Stop reason: HOSPADM

## 2024-06-24 RX ADMIN — EZETIMIBE 10 MG: 10 TABLET ORAL at 08:40

## 2024-06-24 RX ADMIN — POTASSIUM CHLORIDE 20 MEQ: 1500 TABLET, EXTENDED RELEASE ORAL at 11:22

## 2024-06-24 RX ADMIN — VANCOMYCIN HYDROCHLORIDE 1000 MG: 1 INJECTION, POWDER, LYOPHILIZED, FOR SOLUTION INTRAVENOUS at 14:02

## 2024-06-24 RX ADMIN — CEFEPIME 1000 MG: 1 INJECTION, POWDER, FOR SOLUTION INTRAMUSCULAR; INTRAVENOUS at 15:07

## 2024-06-24 RX ADMIN — TRAZODONE HYDROCHLORIDE 50 MG: 50 TABLET ORAL at 22:05

## 2024-06-24 RX ADMIN — IPRATROPIUM BROMIDE AND ALBUTEROL SULFATE 1 DOSE: .5; 2.5 SOLUTION RESPIRATORY (INHALATION) at 13:31

## 2024-06-24 RX ADMIN — ISOSORBIDE MONONITRATE 60 MG: 30 TABLET, EXTENDED RELEASE ORAL at 08:38

## 2024-06-24 RX ADMIN — BUDESONIDE INHALATION 500 MCG: 0.5 SUSPENSION RESPIRATORY (INHALATION) at 10:22

## 2024-06-24 RX ADMIN — CEFEPIME 1000 MG: 1 INJECTION, POWDER, FOR SOLUTION INTRAMUSCULAR; INTRAVENOUS at 03:16

## 2024-06-24 RX ADMIN — SODIUM BICARBONATE 650 MG: 650 TABLET ORAL at 22:05

## 2024-06-24 RX ADMIN — CARVEDILOL 3.12 MG: 3.12 TABLET, FILM COATED ORAL at 08:39

## 2024-06-24 RX ADMIN — IPRATROPIUM BROMIDE AND ALBUTEROL SULFATE 1 DOSE: .5; 2.5 SOLUTION RESPIRATORY (INHALATION) at 10:22

## 2024-06-24 RX ADMIN — CYANOCOBALAMIN TAB 1000 MCG 1000 MCG: 1000 TAB at 08:39

## 2024-06-24 RX ADMIN — SODIUM CHLORIDE, PRESERVATIVE FREE 10 ML: 5 INJECTION INTRAVENOUS at 08:40

## 2024-06-24 RX ADMIN — BUDESONIDE INHALATION 500 MCG: 0.5 SUSPENSION RESPIRATORY (INHALATION) at 19:37

## 2024-06-24 RX ADMIN — Medication 500 MCG: at 08:39

## 2024-06-24 RX ADMIN — SODIUM BICARBONATE 650 MG: 650 TABLET ORAL at 08:39

## 2024-06-24 RX ADMIN — PANTOPRAZOLE SODIUM 40 MG: 40 TABLET, DELAYED RELEASE ORAL at 05:11

## 2024-06-24 RX ADMIN — SODIUM BICARBONATE 650 MG: 650 TABLET ORAL at 13:54

## 2024-06-24 RX ADMIN — SODIUM CHLORIDE, PRESERVATIVE FREE 10 ML: 5 INJECTION INTRAVENOUS at 22:05

## 2024-06-24 RX ADMIN — IPRATROPIUM BROMIDE AND ALBUTEROL SULFATE 1 DOSE: .5; 2.5 SOLUTION RESPIRATORY (INHALATION) at 16:40

## 2024-06-24 RX ADMIN — Medication 5 MG: at 21:09

## 2024-06-24 RX ADMIN — IPRATROPIUM BROMIDE AND ALBUTEROL SULFATE 1 DOSE: .5; 2.5 SOLUTION RESPIRATORY (INHALATION) at 19:37

## 2024-06-24 RX ADMIN — ALLOPURINOL 300 MG: 100 TABLET ORAL at 08:39

## 2024-06-24 ASSESSMENT — PAIN SCALES - GENERAL: PAINLEVEL_OUTOF10: 0

## 2024-06-24 NOTE — PROGRESS NOTES
HOSPITALIST PROGRESS NOTES     ADMITTING DATE AND TIME: 6/23/2024  2:17 AM  had concerns including Shortness of Breath ((CP, weakness, SOB started last night around 9pm, wife gave patient 1 nitro, patient also has bilateral feet edema that started last week, patient states he took an extra lasix)).    ADMIT DX: Elevated troponin [R79.89]  Pneumonia of right lung due to infectious organism, unspecified part of lung [J18.9]  Anemia, unspecified type [D64.9]  Chest pain, unspecified type [R07.9]  Acute on chronic anemia [D64.9]    SYNOPSIS: Frederick presents to ER for shortness of breath, chest pressure.  This started a few hours prior to presentation to the ER.  This is worse with exertion.  Shortness of breath has been an ongoing issue but is worsened over the past several days.  He does admit to some dry cough.  He started having chest pressure around 9 PM and it does not radiate.  He took a nitro at home along with aspirin.  He states he took an extra dose of his diuretic at home.  He has a hemoglobin of 5.8 and platelets of 33.  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 Garcia.  Trop elevated at 203 and BNP 11,590, both higher than baseline. He recently was admitted 2 weeks ago for chest pain and anemia also.  Cardiology was consulted at that time and believes that this was likely demand ischemia from anemia.  He will be admitted with consultation to cardiology and oncology. Upon presentation to the ER, routine labwork was performed which revealed potassium 3.2, BUN 34, creatinine 2.2, proBNP 11,590, troponin 203, WBC 72.1, hemoglobin 5.8, platelets 33. The patient received PRBC in the emergency room and was admitted to [facility].    SUBJECTIVE:  Patient is being followed for Elevated troponin [R79.89]  Pneumonia of right lung due to infectious organism,  aspirin, statin, beta-blocker, Imdur    Severe anemia  transfuse for hemoglobin <8. Follow H&H    AML/leukocytosis/anemia/thrombocytopenia  normally follows with Dr. Garcia. Transfuse for hemoglobin <8    Decompensated HFpEF  BNP 96664 which is higher than baseline  He took extra dose of lasix at home.     CHARANJIT on CKD  HAP  Acute hypoxic respiratory failure  due to above. Wean O2 as tolerated    Hypertension  Non-insulin-requiring diabetes mellitus  Hyperlipidemia  PAD  GERD  BPH  Gout  IV dye allergy    Detail discussion bedside with the patient, he wants to proceed with comfort care.  Will discontinue IV antibiotic, and IV medication and proceed with comfort symptomatic treatment. Consult palliative care and hospice.     NOTE: This report was transcribed using voice recognition software. Every effort was made to ensure accuracy; however, inadvertent computerized transcription errors may be present.    Electronically signed by Chip Adamson MD on 6/24/2024 at 8:29 AM

## 2024-06-24 NOTE — PROGRESS NOTES
Attending notified of abnormal morning labs. Also, asked him to speak with pt regarding code status. Per pt there is a mix up on the chart.     Carly Stinson RN

## 2024-06-24 NOTE — PROGRESS NOTES
Pharmacy Consultation Note  (Antibiotic Dosing and Monitoring)    Initial consult date: 6/23/24  Consulting physician/provider: Dr. Monahan  Drug: Vancomycin  Indication: Pneumonia (HAP)    Age/  Gender Height Weight IBW  Allergy Information   88 y.o./male 167.6 cm (5' 6\") 73.5 kg (162 lb)     Ideal body weight: 63.8 kg (140 lb 10.5 oz)  Adjusted ideal body weight: 67.8 kg (149 lb 6.9 oz)   Altace [ramipril], Benadryl [diphenhydramine], and Iodinated contrast media      Renal Function:  Recent Labs     06/23/24  0215 06/24/24  0608   BUN 34* 32*   CREATININE 2.2* 1.7*         Intake/Output Summary (Last 24 hours) at 6/24/2024 1130  Last data filed at 6/24/2024 0745  Gross per 24 hour   Intake 258 ml   Output 500 ml   Net -242 ml         Vancomycin Monitoring:  Trough:  No results for input(s): \"VANCOTROUGH\" in the last 72 hours.  Random:    Recent Labs     06/24/24  0608   VANCORANDOM 9.9       Vancomycin Administration Times:  Recent vancomycin administrations                     vancomycin (VANCOCIN) 1,750 mg in sodium chloride 0.9 % 500 mL IVPB (mg) 1,750 mg New Bag 06/23/24 0454             Assessment:  89 yo/M starting vancomycin for PNA (HAP). Presented to ED w/CP & SOB.    PMH: CKD, AML, anemia, CAD (d/t anemia).   Load with 1750 mg once. Will dose based on levels due to renal fxn.    Cr = 2.2 (baseline = 1-1.9).   Estimated Creatinine Clearance: 28 mL/min (A) (based on SCr of 1.7 mg/dL (H)).  To dose vancomycin, pharmacy will be utilizing dosing based off of levels because of patient's renal impairment/insufficiency.  6/24: day #2 vanco & cefepime; vanc level = 9.9 mCg/mL @ 0608 today.    Plan:  Give vancomycin 1000 mg x1 today.    Will continue to monitor renal function.   Pharmacy to follow.    Souleymane Ventura, PharmABDI  6/24/2024  11:36 AM  [phone]

## 2024-06-24 NOTE — PLAN OF CARE
Problem: Chronic Conditions and Co-morbidities  Goal: Patient's chronic conditions and co-morbidity symptoms are monitored and maintained or improved  6/23/2024 2332 by Miguel Angel Serrano RN  Outcome: Progressing  6/23/2024 1125 by Favio Navarro RN  Outcome: Progressing     Problem: Discharge Planning  Goal: Discharge to home or other facility with appropriate resources  6/23/2024 2332 by Miguel Angel Serrano RN  Outcome: Progressing  6/23/2024 1125 by Favio Navarro RN  Outcome: Progressing     Problem: Pain  Goal: Verbalizes/displays adequate comfort level or baseline comfort level  6/23/2024 2332 by Miguel Angel Serrano RN  Outcome: Progressing  6/23/2024 1125 by Favio Navarro RN  Outcome: Progressing     Problem: Safety - Adult  Goal: Free from fall injury  6/23/2024 2332 by Miguel Angel Serrano RN  Outcome: Progressing  6/23/2024 1125 by Favio Navarro RN  Outcome: Progressing     Problem: Skin/Tissue Integrity  Goal: Absence of new skin breakdown  Description: 1.  Monitor for areas of redness and/or skin breakdown  2.  Assess vascular access sites hourly  3.  Every 4-6 hours minimum:  Change oxygen saturation probe site  4.  Every 4-6 hours:  If on nasal continuous positive airway pressure, respiratory therapy assess nares and determine need for appliance change or resting period.  6/23/2024 2332 by Miguel Angel Serrano RN  Outcome: Progressing  6/23/2024 1125 by Favio Navarro RN  Outcome: Progressing     Problem: ABCDS Injury Assessment  Goal: Absence of physical injury  6/23/2024 2332 by Miguel Angel Serrano RN  Outcome: Progressing  6/23/2024 1125 by Favio Navarro RN  Outcome: Progressing

## 2024-06-24 NOTE — PROGRESS NOTES
[facility]  MD Maciej Jiménez MD Ali Hassan, MD Lisa Kniska, CNP   Nia Moya, HUNTER Meyers, RONNY  Progress Note    6/24/2024    SUBJECTIVE:   6/24: Sitting up in the chair. Does complain of dyspnea. PO intake remains poor. He has decided to pursue hospice measures at this time and would like comfort care.     PROBLEM LIST:    Principal Problem:    Acute on chronic anemia  Active Problems:    Coronary artery disease    JANE (dyspnea on exertion)    Anemia    Elevated troponin    Hyperlipidemia, mixed    CHARANJIT (acute kidney injury) (HCC)    Acute decompensated heart failure (HCC)    Primary hypertension    Acute myeloid leukemia not having achieved remission (HCC)  Resolved Problems:    * No resolved hospital problems. *         DIET:    ADULT DIET; Regular     MEDS (scheduled):    sodium bicarbonate  650 mg Oral TID    ipratropium 0.5 mg-albuterol 2.5 mg  1 Dose Inhalation Q4H WA RT    budesonide  0.5 mg Nebulization BID RT    sodium chloride flush  5-40 mL IntraVENous 2 times per day    folic acid  500 mcg Oral QAM    pantoprazole  40 mg Oral QAM AC    traZODone  50 mg Oral Nightly       MEDS (infusions):   sodium chloride      sodium chloride      sodium chloride         MEDS (prn):  LORazepam, morphine 20MG/ML, glycopyrrolate, sodium chloride, benzocaine-menthol, benzonatate, calcium carbonate, hydrALAZINE, melatonin, Polyvinyl Alcohol-Povidone PF, sodium chloride, sodium chloride flush, sodium chloride, ondansetron **OR** ondansetron, polyethylene glycol, acetaminophen **OR** acetaminophen, docusate sodium, zolpidem, sodium chloride, perflutren lipid microspheres    PHYSICAL EXAM:     Patient Vitals for the past 24 hrs:   BP Temp Temp src Pulse Resp SpO2 Weight   06/24/24 1640 -- -- -- -- -- 95 % --   06/24/24 1512 134/75 97.1 °F (36.2 °C) Temporal 99 22 93 % --   06/24/24 1113 135/78 98.2 °F (36.8 °C) Temporal (!) 105 26 95 % --   06/24/24 0745 134/74 97.9  °F (36.6 °C) Temporal 92 24 97 % --   06/24/24 0538 -- -- -- -- -- -- 73.8 kg (162 lb 9.6 oz)   06/24/24 0318 139/77 97.1 °F (36.2 °C) Temporal 92 28 98 % --   06/23/24 2319 134/82 97.3 °F (36.3 °C) Temporal 91 28 97 % --   06/23/24 1959 (!) 158/90 96.9 °F (36.1 °C) Temporal 100 26 98 % --   @      Intake/Output Summary (Last 24 hours) at 6/24/2024 1714  Last data filed at 6/24/2024 1520  Gross per 24 hour   Intake 180 ml   Output 500 ml   Net -320 ml         Wt Readings from Last 3 Encounters:   06/24/24 73.8 kg (162 lb 9.6 oz)   06/04/24 68 kg (150 lb)   05/16/24 71.7 kg (158 lb)       Constitutional:  in no acute distress  HEENT: NC/AT, EOMI, sclera and conjunctiva are clear and anicteric, mucus membranes moist  Neck: Trachea midline, no JVD  Cardiovascular: S1, S2 regular rhythm, no murmur,or rub  Respiratory:  CTAB . No crackles, no wheeze  Gastrointestinal:  Soft, nontender, nondistended, NABS  Ext: +2 dependent edema, feet warm  Skin: dry, no rash  Neuro: awake, alert, interactive      DATA:    Recent Labs     06/23/24  0215 06/23/24  0732 06/23/24  2157 06/24/24  0608 06/24/24  1210   WBC 72.1*  --   --  99.8*  --    HGB 5.8*   < > 8.5* 8.7* 8.3*   HCT 17.7*   < > 26.0* 26.0* 25.0*   MCV 93.2  --   --  90.6  --     < > = values in this interval not displayed.     Recent Labs     06/23/24  0215 06/23/24  0330 06/24/24  0608     --  135   K 3.2*  --  3.4*   CL 98  --  101   CO2 22  --  17*   MG  --  1.6  --    PHOS  --  4.5  --    BUN 34*  --  32*   CREATININE 2.2*  --  1.7*   ALT 17  --  17   AST 23  --  26   BILITOT 0.8  --  1.1   ALKPHOS 34*  --  37*       Lab Results   Component Value Date    LABPROT 0.1 10/28/2021    LABPROT 0.1 10/28/2021       ASSESSMENT:    1. Acute kidney injury appears to be related to decreased effective volume in the setting of severe anemia.  2. Chronic kidney disease, stage 3.  Baseline creatinine is 1.2 to 1.6.  3. Acute myeloid leukemia, being followed by Dr. Kwok.  He

## 2024-06-24 NOTE — CONSULTS
Patient is not appropriate for the CHF clinic. Patient wishes comfort measures. Hospice is following.

## 2024-06-24 NOTE — CONSULTS
Palliative Care Department  [phone]  Palliative Care Initial Consult  Provider Elzbieta Bass, VIKTORIYA - CNP      PATIENT: Frederick Santiago  : 1935  MRN: [id number]  ADMISSION DATE: 2024  2:17 AM  Referring Provider:  Chip Adamson MD    Palliative Medicine was consulted on hospital day 1 for assistance with Goals of care, Code Status Discussion, overwhelmed symptoms, psychosocial distress  HPI:     Clinical Summary:Frederick Santiago is a 88 y.o. y/o male with a history of CAD, AML, CKD, mild VHD, HFpEF, hypertension, hyperlipidemia, diabetes, PAD, GERD, gout, BPH, with recent hospitalization for chest pain and anemia, cardiology has seen patient, with findings of chest pain related to demand ischemia from anemia, who presented to [facility] on 2024 with shortness of breath, weakness, bilateral lower extremity edema, chest pain has received 1 nitro at home.  At ED, significant laboratory findings showed hemoglobin 5.8, platelets 33, WBC 72.1, proBNP 11,590, troponin 203, BUN 34, creatinine 2.2.  Chest x-ray shows right basilar airspace opacity with a small right pleural effusion.  Findings concerning for pneumonia.  Cardiology, nephrology, and oncology consulted.    ASSESSMENT/PLAN:     Pertinent Hospital Diagnoses     Chest pain  Known coronary artery disease SP PCI  Severe anemia  Decompensated HFpEF  CHARANJIT on CKD  AML  Follows with Dr. Garcia  On hydroxyurea      Palliative Care Encounter / Counseling Regarding Goals of Care  Please see detailed goals of care discussion as below  At this time, Frederick Santiago, Does have capacity for medical decision-making.  Capacity is time limited and situation/question specific  Outcome of goals of care meeting:  Wants to pursue comfort measures only  He would like to discuss option with hospice, when his wife is at the bedside  CODE STATUS discussed and changed to DNR CC  Comfort medications already in placed  Will continue to follow  Code status  goals of care, and symptom management.    Thank you for allowing Palliative Medicine to participate in the care of Frederick Santiago.    Note: This report was completed using computer[employer] voiced recognition software.  Every effort has been made to ensure accuracy; however, inadvertent computerized transcription errors may be present.

## 2024-06-24 NOTE — CARE COORDINATION
Transition of care: Admitted with acute on chronic anemia.   IV maxipime 1000mg q 12 hrs, IVFs w/20 meq kcl at 45ml/hr. K 3.4, hgb 8.7 and other labs noted. Cardiology, nephrology, hem onc and palliative care consulted. Hospice consult on chart for cancer. Pt's nurse spoke with pt regarding hospice choice and pt would [facility] Referral was made to Steffi with [facility] intake.       1430  Plan is SNF under [facility] care. Private pay for room and board. DNR-CC. Iv vanco 1000mg x 1. Met with pt and pt's wife and 2 sons in room. Pt up in chair. O2 at 2l/nc. Pt lives with Taylor in a 2 story home. Was independent with ADLs pta. Taylor and family provide transportation. DME- quad cane and a FWW. PCP is Dr DOMINGUEZ Arcos and pharmacy is [facility] in [place]. SNF - choices are #1 [facility] -referral was given to [facility] and #2 [facility]- referral was given to [facility]. Cm/sw will follow.       Case Management Assessment  Initial Evaluation    Date/Time of Evaluation: 6/24/2024 3:11 PM  Assessment Completed by: January Arzola RN    If patient is discharged prior to next notation, then this note serves as note for discharge by case management.    Patient Name: Frederick Santiago                   YOB: 1935  Diagnosis: Elevated troponin [R79.89]  Pneumonia of right lung due to infectious organism, unspecified part of lung [J18.9]  Anemia, unspecified type [D64.9]  Chest pain, unspecified type [R07.9]  Acute on chronic anemia [D64.9]                   Date / Time: 6/23/2024  2:17 AM    Patient Admission Status: Inpatient   Readmission Risk (Low < 19, Mod (19-27), High > 27): Readmission Risk Score: 20    Current PCP: Wilbert Arcos, DO  PCP verified by CM? Yes    Chart Reviewed: Yes      History Provided by: Spouse  Patient Orientation: Alert and Oriented    Patient Cognition: Other (see comment) (spoke with pt's wife)        Advance Directives:      Code Status: DNR-CC     Discharge  Planning:    Patient lives with: Spouse/Significant Other Type of Home: House  Primary Care Giver: Self  Patient Support Systems include: Spouse/Significant Other, Children     ADLS  Prior functional level: Independent in ADLs/IADLs      Family can provide assistance at DC: Other (comment) (pt going to snf under [facility] care)  Would you like Case Management to discuss the discharge plan with any other family members/significant others, and if so, who? No  Plans to Return to Present Housing: Other (see comment) (pt going to snf under [facility] care)    Financial    Payor: MEDICARE / Plan: MEDICARE PART A AND B / Product Type: *No Product type* /     Does insurance require precert for SNF: No    The Plan for Transition of Care is related to the following treatment goals of Elevated troponin [R79.89]  Pneumonia of right lung due to infectious organism, unspecified part of lung [J18.9]  Anemia, unspecified type [D64.9]  Chest pain, unspecified type [R07.9]  Acute on chronic anemia [D64.9]        The Patient and/or Patient Representative Agree with the Discharge Plan?  Yes     January Arzola RN  Case Management Department  Ph: [phone]

## 2024-06-24 NOTE — PROGRESS NOTES
Clinical Pharmacy Note    Vancomycin was stopped today by Dr. Adamson.     Clinical Pharmacy sign off.     Souleymane Ventura, Noah  6/24/2024  3:54 PM  [phone]

## 2024-06-24 NOTE — ACP (ADVANCE CARE PLANNING)
Advance Care Planning   The patient has the following advanced directives on file:  Advance Directives       Power of  Living Will ACP-Advance Directive ACP-Power of     Not on File Not on File Filed Not on File            CONTACTS:     Primary Decision Maker: Taylor Santiago - Spouse - [phone]    Secondary Decision Maker: Edgar Santiago - Child - [phone]    The Patient has the following current code status:    Code Status: DNR-CC

## 2024-06-24 NOTE — PROGRESS NOTES
3.125 MG tablet Take 8 tablets by mouth 2 times daily    Antionette Yepez MD   ezetimibe (ZETIA) 10 MG tablet Take 1 tablet by mouth every morning    Antionette Yepez MD   isosorbide mononitrate (IMDUR) 30 MG extended release tablet Take 1 tablet by mouth every morning 30mg in morning, 60mg in evening    Antionette Yepez MD   nateglinide (STARLIX) 120 MG tablet Take 1 tablet by mouth 3 times daily (before meals)    Antionette Yepez MD   traZODone (DESYREL) 50 MG tablet Take 1 tablet by mouth nightly    Antionette Yepez MD   zolpidem (AMBIEN) 5 MG tablet Take 1 tablet by mouth nightly as needed for Sleep.    Antionette Yepez MD   vitamin D (ERGOCALCIFEROL) 1.25 MG (91166 UT) CAPS capsule Take 1 capsule by mouth every 30 days *1ST OF THE MONTH*  LAST DOSE: 06/01/2024  NEXT DOSE DUE: 07/01/2024    Historical Rafiq Yepez   sodium bicarbonate 650 MG tablet Take 1 tablet by mouth 2 times daily    Historical Rafiq Yepez   atorvastatin (LIPITOR) 80 MG tablet Take 1 tablet by mouth every morning    Antionette Yepez MD   docusate sodium (COLACE) 100 MG capsule Take 1 capsule by mouth daily as needed for Constipation 10/4/17   Carole Cohen MD   omeprazole (PRILOSEC) 20 MG delayed release capsule Take 1 capsule by mouth daily as needed (EPIGASTRIC DISCOMFROT)    Antionette Yepez MD   nitroGLYCERIN (NITROSTAT) 0.4 MG SL tablet Place 1 tablet under the tongue every 5 minutes as needed for Chest pain 6/9/16   William Saini MD   aspirin 81 MG EC tablet Take 1 tablet by mouth every morning    Antionette Yepez MD   vitamin B-12 (CYANOCOBALAMIN) 1000 MCG tablet Take 1 tablet by mouth every morning    Antionette Yepez MD   folic acid (FOLVITE) 400 MCG tablet Take 1 tablet by mouth every morning    Antionette Yepez MD       Allergies  Allergies   Allergen Reactions    Altace [Ramipril] Cough    Benadryl [Diphenhydramine] Other (See Comments)     IV BENADRYL, MAKES PT  VERY DROWSY    Iodinated Contrast Media Rash           Objective  /75   Pulse 99   Temp 97.1 °F (36.2 °C) (Temporal)   Resp 22   Ht 1.676 m (5' 6\")   Wt 73.8 kg (162 lb 9.6 oz)   SpO2 93%   BMI 26.24 kg/m²     Physical Exam:   Performance Status:  General: AAO to person, place, time, in mild resp distress on supplemental oxygen.   Head and neck : PERRLA, EOMI . Sclera non icteric.  Oropharynx : Clear  Neck: no JVD,  no adenopathy, no carotid bruit  LYMPHATICS : no peripheral lymphadenopathy  Heart: Regular rate and regular rhythm, no murmur  Lungs: Clear to auscultation   Extremities: pitting edema,no cyanosis, no clubbing.   Abdomen: Soft, non-tender;no masses, no organomegaly  Skin:  No rash.      Neurologic:Cranial nerves grossly intact. No focal motor or sensory deficits .    Recent Laboratory Data-   Lab Results   Component Value Date    WBC 99.8 (H) 06/24/2024    HGB 8.3 (L) 06/24/2024    HCT 25.0 (L) 06/24/2024    MCV 90.6 06/24/2024     03/05/2024    LYMPHOPCT 12 (L) 06/23/2024    RBC 2.87 (L) 06/24/2024    MCH 30.3 06/24/2024    MCHC 33.5 06/24/2024    RDW 19.1 (H) 06/24/2024    NEUTOPHILPCT 17 (L) 06/23/2024    MONOPCT 65 (H) 06/23/2024    EOSPCT 0 06/23/2024    BASOPCT 0 06/23/2024    NEUTROABS 12.26 (H) 06/23/2024    LYMPHSABS 1.59 05/03/2012    MONOSABS 46.87 (H) 06/23/2024    EOSABS 0.00 (L) 06/23/2024    BASOSABS 0.00 06/23/2024       Lab Results   Component Value Date     06/24/2024    K 3.4 (L) 06/24/2024     06/24/2024    CO2 17 (L) 06/24/2024    BUN 32 (H) 06/24/2024    CREATININE 1.7 (H) 06/24/2024    GLUCOSE 156 (H) 06/24/2024    CALCIUM 8.8 06/24/2024    BILITOT 1.1 06/24/2024    ALKPHOS 37 (L) 06/24/2024    AST 26 06/24/2024    ALT 17 06/24/2024    LABGLOM 39 (L) 06/24/2024    GFRAA 58 10/11/2022       Lab Results   Component Value Date    IRON 42 (L) 04/15/2021    TIBC 286 04/15/2021    FERRITIN 158.65 04/15/2021           Radiology-    XR CHEST PORTABLE    Final Result   Right basilar airspace opacities with small right pleural effusion. Findings   are concerning for pneumonia.               ASSESSMENT/PLAN :  88 years old male with AML (refused hypomethylating agents) on hydroxyurea 1 gm twice daily.  Admitted with symptomatic anemia.    Worsening leukocytosis anemia thrombocytopenia.  Due to progression of AML.  Hold Hydrea.  Trend CBC and transfuse for hemoglobin less than 7, platelet count less than 10,000 or active bleeding.  Patient considering hospice which is reasonable.   ON abx for pna.   Nephrology consulted for alen,.   Will continue to follow.  Thank you for the consult    6/24/24  - AML on Hydrea as above  - Hgb 8.3, platelets 27, WBC 99  - Patient has elected to stop therapy and pursue hospice care, which is entirely appropriate  - Oncology to sign off      Electronically signed by Dennis Sheridan MD on 6/24/2024 at 4:28 PM

## 2024-06-24 NOTE — PROGRESS NOTES
Consult received and chart reviewed. Visit made to the bedside. Wife and son at the bedside. Hospice philosophy and services discussed. All questions answered. Pt is alert and oriented x4. Pt states he does only want comfort measures. Pt would like to go to a facility with hospice as home with hospice would be too much of a burden on his wife. [facility] liaison obtained facility list from CM and provided to pt and family. CM to follow up with family for choices and financials. [facility] will continue to follow. Updates provided to CM and bedside nurse.     Electronically signed by Steffi Tipton RN on 6/24/2024 at 12:02 PM  [phone]

## 2024-06-24 NOTE — PATIENT CARE CONFERENCE
[facility] Quality Flow/Interdisciplinary Rounds Progress Note        Quality Flow Rounds held on June 24, 2024    Disciplines Attending:  Bedside Nurse, , , and Nursing Unit Leadership    Frederick Santiago was admitted on 6/23/2024  2:17 AM    Anticipated Discharge Date:       Disposition:    Ambrose Score:  Ambrose Scale Score: 19    Readmission Risk              Risk of Unplanned Readmission:  28           Discussed patient goal for the day, patient clinical progression, and barriers to discharge.  The following Goal(s) of the Day/Commitment(s) have been identified:  Labs - Report Results and       Cathy Anaya RN  June 24, 2024

## 2024-06-25 ENCOUNTER — APPOINTMENT (OUTPATIENT)
Age: 89
DRG: 811 | End: 2024-06-25
Attending: INTERNAL MEDICINE
Payer: MEDICARE

## 2024-06-25 LAB
ANION GAP SERPL CALCULATED.3IONS-SCNC: 13 MMOL/L (ref 7–16)
BNP SERPL-MCNC: ABNORMAL PG/ML (ref 0–450)
BUN SERPL-MCNC: 37 MG/DL (ref 6–23)
CALCIUM SERPL-MCNC: 8.8 MG/DL (ref 8.6–10.2)
CHLORIDE SERPL-SCNC: 101 MMOL/L (ref 98–107)
CO2 SERPL-SCNC: 16 MMOL/L (ref 22–29)
CREAT SERPL-MCNC: 1.9 MG/DL (ref 0.7–1.2)
GFR, ESTIMATED: 35 ML/MIN/1.73M2
GLUCOSE SERPL-MCNC: 227 MG/DL (ref 74–99)
HCT VFR BLD AUTO: 21.7 % (ref 37–54)
HCT VFR BLD AUTO: 23.9 % (ref 37–54)
HCT VFR BLD AUTO: 25 % (ref 37–54)
HCT VFR BLD AUTO: 25.1 % (ref 37–54)
HGB BLD-MCNC: 7.1 G/DL (ref 12.5–16.5)
HGB BLD-MCNC: 7.6 G/DL (ref 12.5–16.5)
HGB BLD-MCNC: 8.2 G/DL (ref 12.5–16.5)
HGB BLD-MCNC: 8.4 G/DL (ref 12.5–16.5)
POTASSIUM SERPL-SCNC: 4.5 MMOL/L (ref 3.5–5)
SODIUM SERPL-SCNC: 130 MMOL/L (ref 132–146)

## 2024-06-25 PROCEDURE — 6370000000 HC RX 637 (ALT 250 FOR IP): Performed by: INTERNAL MEDICINE

## 2024-06-25 PROCEDURE — 6360000002 HC RX W HCPCS: Performed by: INTERNAL MEDICINE

## 2024-06-25 PROCEDURE — 83880 ASSAY OF NATRIURETIC PEPTIDE: CPT

## 2024-06-25 PROCEDURE — 85014 HEMATOCRIT: CPT

## 2024-06-25 PROCEDURE — 94640 AIRWAY INHALATION TREATMENT: CPT

## 2024-06-25 PROCEDURE — 99231 SBSQ HOSP IP/OBS SF/LOW 25: CPT | Performed by: INTERNAL MEDICINE

## 2024-06-25 PROCEDURE — 99231 SBSQ HOSP IP/OBS SF/LOW 25: CPT

## 2024-06-25 PROCEDURE — 2580000003 HC RX 258: Performed by: INTERNAL MEDICINE

## 2024-06-25 PROCEDURE — 80048 BASIC METABOLIC PNL TOTAL CA: CPT

## 2024-06-25 PROCEDURE — 2140000000 HC CCU INTERMEDIATE R&B

## 2024-06-25 PROCEDURE — 36415 COLL VENOUS BLD VENIPUNCTURE: CPT

## 2024-06-25 PROCEDURE — 85018 HEMOGLOBIN: CPT

## 2024-06-25 PROCEDURE — 2700000000 HC OXYGEN THERAPY PER DAY

## 2024-06-25 RX ADMIN — FUROSEMIDE 20 MG: 20 TABLET ORAL at 08:58

## 2024-06-25 RX ADMIN — CARVEDILOL 25 MG: 25 TABLET, FILM COATED ORAL at 16:35

## 2024-06-25 RX ADMIN — Medication 5 MG: at 01:21

## 2024-06-25 RX ADMIN — SODIUM CHLORIDE, PRESERVATIVE FREE 10 ML: 5 INJECTION INTRAVENOUS at 20:10

## 2024-06-25 RX ADMIN — IPRATROPIUM BROMIDE AND ALBUTEROL SULFATE 1 DOSE: .5; 2.5 SOLUTION RESPIRATORY (INHALATION) at 12:08

## 2024-06-25 RX ADMIN — ISOSORBIDE MONONITRATE 60 MG: 30 TABLET, EXTENDED RELEASE ORAL at 08:57

## 2024-06-25 RX ADMIN — SODIUM CHLORIDE, PRESERVATIVE FREE 10 ML: 5 INJECTION INTRAVENOUS at 08:58

## 2024-06-25 RX ADMIN — CARVEDILOL 25 MG: 25 TABLET, FILM COATED ORAL at 08:58

## 2024-06-25 RX ADMIN — SODIUM BICARBONATE 650 MG: 650 TABLET ORAL at 15:22

## 2024-06-25 RX ADMIN — SODIUM BICARBONATE 650 MG: 650 TABLET ORAL at 20:08

## 2024-06-25 RX ADMIN — IPRATROPIUM BROMIDE AND ALBUTEROL SULFATE 1 DOSE: .5; 2.5 SOLUTION RESPIRATORY (INHALATION) at 20:57

## 2024-06-25 RX ADMIN — BUDESONIDE INHALATION 500 MCG: 0.5 SUSPENSION RESPIRATORY (INHALATION) at 20:57

## 2024-06-25 RX ADMIN — IPRATROPIUM BROMIDE AND ALBUTEROL SULFATE 1 DOSE: .5; 2.5 SOLUTION RESPIRATORY (INHALATION) at 00:38

## 2024-06-25 RX ADMIN — Medication 500 MCG: at 08:57

## 2024-06-25 RX ADMIN — Medication 5 MG: at 06:08

## 2024-06-25 RX ADMIN — SODIUM BICARBONATE 650 MG: 650 TABLET ORAL at 08:58

## 2024-06-25 RX ADMIN — BUDESONIDE INHALATION 500 MCG: 0.5 SUSPENSION RESPIRATORY (INHALATION) at 08:41

## 2024-06-25 RX ADMIN — PANTOPRAZOLE SODIUM 40 MG: 40 TABLET, DELAYED RELEASE ORAL at 06:08

## 2024-06-25 RX ADMIN — BENZONATATE 100 MG: 100 CAPSULE ORAL at 15:26

## 2024-06-25 RX ADMIN — IPRATROPIUM BROMIDE AND ALBUTEROL SULFATE 1 DOSE: .5; 2.5 SOLUTION RESPIRATORY (INHALATION) at 16:48

## 2024-06-25 RX ADMIN — IPRATROPIUM BROMIDE AND ALBUTEROL SULFATE 1 DOSE: .5; 2.5 SOLUTION RESPIRATORY (INHALATION) at 08:41

## 2024-06-25 ASSESSMENT — PAIN SCALES - GENERAL
PAINLEVEL_OUTOF10: 0

## 2024-06-25 NOTE — PROGRESS NOTES
[facility]  Hematology/Oncology  Consult  Dr. Mao Kelley M.D.    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  Came to the ER complaining of worsening dyspnea on exertion.  Labs on admission were remarkable for a white cell count of 72,000, hemoglobin of 5, platelets 33,000.  Differential showed 46% monocytes.  proBNP 11,000.  Troponins 203.  Cardiology consulted for chest pain shortness of breath.  No intervention.  Symptoms due to demand ischemia.  No bleeding bruising.  No fever or chills.  Chest x-ray showed right basilar airspace opacities and pleural effusion.    Review of systems: Over 10 systems were reviewed and all were negative except as mention above.    Diagnostic Data:     Past Medical History:   Diagnosis Date    Anal fissure 10/27/2016    Anemia     Arthritis     CAD (coronary artery disease)     Diabetes mellitus (HCC)     ADULT ONSET    GERD (gastroesophageal reflux disease) 10/27/2016    Gout     History of blood transfusion     Hyperkalemia     Hyperlipidemia     Low back pain 07/21/2017    Low grade myelodysplastic syndrome lesions ([facility]) 06/13/2017    OA (osteoarthritis) 10/27/2016    Pain of left lower extremity 07/21/2017    Rectal fissure 1987       Patient Active Problem List    Diagnosis Date Noted    Stage 3a chronic kidney disease (CKD) ([facility]) 02/20/2023    Influenzal pneumonia 12/03/2022    Acute on chronic anemia 06/23/2024    CHARANJIT (acute kidney injury) ([facility]) 06/03/2024    Ischemic cardiomyopathy 06/03/2024     VERY DROWSY    Iodinated Contrast Media Rash           Objective  /64   Pulse 85   Temp 98.2 °F (36.8 °C) (Temporal)   Resp 15   Ht 1.676 m (5' 6\")   Wt 73.8 kg (162 lb 9.6 oz)   SpO2 96%   BMI 26.24 kg/m²     Physical Exam:   Performance Status:  General: AAO to person, place, time, in mild resp distress on supplemental oxygen.   Head and neck : PERRLA, EOMI . Sclera non icteric.  Oropharynx : Clear  Neck: no JVD,  no adenopathy, no carotid bruit  LYMPHATICS : no peripheral lymphadenopathy  Heart: Regular rate and regular rhythm, no murmur  Lungs: Clear to auscultation   Extremities: pitting edema,no cyanosis, no clubbing.   Abdomen: Soft, non-tender;no masses, no organomegaly  Skin:  No rash.      Neurologic:Cranial nerves grossly intact. No focal motor or sensory deficits .    Recent Laboratory Data-   Lab Results   Component Value Date    WBC 99.8 (H) 06/24/2024    HGB 8.2 (L) 06/25/2024    HCT 25.0 (L) 06/25/2024    MCV 90.6 06/24/2024     03/05/2024    LYMPHOPCT 12 (L) 06/23/2024    RBC 2.87 (L) 06/24/2024    MCH 30.3 06/24/2024    MCHC 33.5 06/24/2024    RDW 19.1 (H) 06/24/2024    NEUTOPHILPCT 17 (L) 06/23/2024    MONOPCT 65 (H) 06/23/2024    EOSPCT 0 06/23/2024    BASOPCT 0 06/23/2024    NEUTROABS 12.26 (H) 06/23/2024    LYMPHSABS 1.59 05/03/2012    MONOSABS 46.87 (H) 06/23/2024    EOSABS 0.00 (L) 06/23/2024    BASOSABS 0.00 06/23/2024       Lab Results   Component Value Date     (L) 06/25/2024    K 4.5 06/25/2024     06/25/2024    CO2 16 (L) 06/25/2024    BUN 37 (H) 06/25/2024    CREATININE 1.9 (H) 06/25/2024    GLUCOSE 227 (H) 06/25/2024    CALCIUM 8.8 06/25/2024    BILITOT 1.1 06/24/2024    ALKPHOS 37 (L) 06/24/2024    AST 26 06/24/2024    ALT 17 06/24/2024    LABGLOM 35 (L) 06/25/2024    GFRAA 58 10/11/2022       Lab Results   Component Value Date    IRON 42 (L) 04/15/2021    TIBC 286 04/15/2021    FERRITIN 158.65 04/15/2021           Radiology-    XR CHEST PORTABLE

## 2024-06-25 NOTE — PROGRESS NOTES
Inpatient Cardiology Progress note        SUBJECTIVE/OBJECTIVE:   Denies chest pain. His breathing improving. Continues to have LE edema      PHYSICAL EXAM:   /72   Pulse (!) 116   Temp 97 °F (36.1 °C) (Temporal)   Resp 22   Ht 1.676 m (5' 6\")   Wt 73.8 kg (162 lb 9.6 oz)   SpO2 96%   BMI 26.24 kg/m²    B/P Range last 24 hours: Systolic (24hrs), Av , Min:134 , Max:139    Diastolic (24hrs), Av, Min:72, Max:82      GENERAL: Not in distress.   HEAD: Normocephalic, Atraumatic.   NECK: No JVD. No carotid bruits. No neck masses.?   CARDIOVASCULAR: Normal rate, regular rhythm, normal S1, S2, no MRG    LUNGS:bibasilar crackles.?   ABDOMEN: Abdomen is soft and not distended. No tenderness.  EXTREMITIES:There is 2+ pedal edema.   MUSCULOSKELETAL: No joint swelling. Normal range of motion?   SKIN: Skins is moist. No ulcers or lesions.   NEUROLOGICAL: Conscious, alert, oriented to time, place and person. No evidence of obvious neurological deficits.?   PSYCHOLOGICAL: Patient is in normal mood.?       Intake/Output Summary (Last 24 hours) at 2024 2212  Last data filed at 2024 1520  Gross per 24 hour   Intake 180 ml   Output 450 ml   Net -270 ml       Weight:   Wt Readings from Last 3 Encounters:   24 73.8 kg (162 lb 9.6 oz)   24 68 kg (150 lb)   24 71.7 kg (158 lb)       Current Inpatient Medications:   sodium bicarbonate  650 mg Oral TID    ipratropium 0.5 mg-albuterol 2.5 mg  1 Dose Inhalation Q4H WA RT    budesonide  0.5 mg Nebulization BID RT    sodium chloride flush  5-40 mL IntraVENous 2 times per day    folic acid  500 mcg Oral QAM    pantoprazole  40 mg Oral QAM AC    traZODone  50 mg Oral Nightly       IV Infusions (if any):   sodium chloride      sodium chloride      sodium chloride         DIAGNOSTIC/ LABORATORY DATA:  Labs:   CBC:   Recent Labs     24  0215 24  0732 24  0608 24  1210 24  1750   WBC 72.1*  --  99.8*  --   --    HGB

## 2024-06-25 NOTE — PROGRESS NOTES
Visited the patient, he stated that he is not doing all that good. That he is receiving Chemo treatment  and hope it will be good. Indicated that his family are doing their best. Assured him of our support and he appreciated my visit.    . [email or web address],B.TH, [id number]

## 2024-06-25 NOTE — PROGRESS NOTES
Hospice consult  DNR-CC  Symptom management  Cardiology will sign off, please call if needed  Electronically signed by BLANCA BLAIR on 6/25/2024 at 9:31 AM

## 2024-06-25 NOTE — CARE COORDINATION
Transition of care update: DNR-CC. Plan is SNF under [facility] care. O2 at 4l/nc. Messaged Isa with [facility] [place] and they have no beds available. [facility]- spoke with [facility] and they have accepted pt. Went to pt's room and no family present. Called pt's wife, Taylor, ph#[phone]. Taylor is aware no bed available at [facility]. She is still going to tour the facility and is going to tour [facility] as well. Informed her [facility] has accepted pt. Taylor said she will be in late this afternoon. I provided her with my work cell number. Steffi with [facility] was updated. Cm/sw will follow.

## 2024-06-25 NOTE — PROGRESS NOTES
Follow up visit made to the bedside. Pt up in the chair, O2 in place. Pt appears slightly SOB with rr 21. Pt states he feels slightly SOB but is ok. Pt has received a few doses of comfort meds SL and states they have been effective. Pt states he cannot lie flat in the bed as he can't breath when he does. Pt states he feels most comfortable sitting up but is not getting a lot of rest d/t this. Pt declines the need for comfort medications at this time. Call placed to spouse Taylor. Updates provided to CM, charge nurse and wife. [facility]OT[facility] will continue to follow for appropriate level of care.     Electronically signed by Steffi Tipton RN on 6/25/2024 at 12:53 PM  [phone]

## 2024-06-25 NOTE — PROGRESS NOTES
[facility] Quality Flow/Interdisciplinary Rounds Progress Note        Quality Flow Rounds held on June 25, 2024    Disciplines Attending:  Bedside Nurse, , , and Nursing Unit Leadership    Frederick Santiago was admitted on 6/23/2024  2:17 AM    Anticipated Discharge Date:       Disposition:    Ambrose Score:  Ambrose Scale Score: 19    Readmission Risk              Risk of Unplanned Readmission:  28           Discussed patient goal for the day, patient clinical progression, and barriers to discharge.  The following Goal(s) of the Day/Commitment(s) have been identified:  Diagnostics - Report Results and Labs - Report Results      Carly Stinson RN  June 25, 2024

## 2024-06-25 NOTE — PROGRESS NOTES
HOSPITALIST PROGRESS NOTES     ADMITTING DATE AND TIME: 6/23/2024  2:17 AM  had concerns including Shortness of Breath ((CP, weakness, SOB started last night around 9pm, wife gave patient 1 nitro, patient also has bilateral feet edema that started last week, patient states he took an extra lasix)).    ADMIT DX: Elevated troponin [R79.89]  Pneumonia of right lung due to infectious organism, unspecified part of lung [J18.9]  Anemia, unspecified type [D64.9]  Chest pain, unspecified type [R07.9]  Acute on chronic anemia [D64.9]    SYNOPSIS: Frederick presents to ER for shortness of breath, chest pressure.  This started a few hours prior to presentation to the ER.  This is worse with exertion.  Shortness of breath has been an ongoing issue but is worsened over the past several days.  He does admit to some dry cough.  He started having chest pressure around 9 PM and it does not radiate.  He took a nitro at home along with aspirin.  He states he took an extra dose of his diuretic at home.  He has a hemoglobin of 5.8 and platelets of 33.  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 Garcia.  Trop elevated at 203 and BNP 11,590, both higher than baseline. He recently was admitted 2 weeks ago for chest pain and anemia also.  Cardiology was consulted at that time and believes that this was likely demand ischemia from anemia.  He will be admitted with consultation to cardiology and oncology. Upon presentation to the ER, routine labwork was performed which revealed potassium 3.2, BUN 34, creatinine 2.2, proBNP 11,590, troponin 203, WBC 72.1, hemoglobin 5.8, platelets 33. The patient received PRBC in the emergency room and was admitted to [facility].    SUBJECTIVE:  Patient is being followed for Elevated troponin [R79.89]  Pneumonia of right lung due to infectious organism,  unspecified part of lung [J18.9]  Anemia, unspecified type [D64.9]  Chest pain, unspecified type [R07.9]  Acute on chronic anemia [D64.9]     Patient was seen examined and evaluated  Recent lab results, charts and pertinent diagnostic imaging reviewed   Ancillary service notes reviewed   He wants to proceed with comfort care  Up in chair, appears short of breath    ROS: denies fever, chills, cp, sob, n/v, HA unless stated above.     sodium bicarbonate  650 mg Oral TID    ipratropium 0.5 mg-albuterol 2.5 mg  1 Dose Inhalation Q4H WA RT    budesonide  0.5 mg Nebulization BID RT    isosorbide mononitrate  60 mg Oral Daily    carvedilol  25 mg Oral BID WC    furosemide  20 mg Oral Daily    sodium chloride flush  5-40 mL IntraVENous 2 times per day    folic acid  500 mcg Oral QAM    pantoprazole  40 mg Oral QAM AC    traZODone  50 mg Oral Nightly     LORazepam, 1 mg, Q2H PRN  morphine 20MG/ML, 5 mg, Q4H PRN  glycopyrrolate, 0.2 mg, Q4H PRN  nitroGLYCERIN, 0.4 mg, Q5 Min PRN  sodium chloride, , PRN  benzocaine-menthol, 1 lozenge, Q2H PRN  benzonatate, 100 mg, TID PRN  calcium carbonate, 500 mg, TID PRN  hydrALAZINE, 10 mg, Q6H PRN  melatonin, 3 mg, Nightly PRN  Polyvinyl Alcohol-Povidone PF, 1 drop, PRN  sodium chloride, 1 spray, PRN  sodium chloride flush, 5-40 mL, PRN  sodium chloride, , PRN  ondansetron, 4 mg, Q8H PRN   Or  ondansetron, 4 mg, Q6H PRN  polyethylene glycol, 17 g, Daily PRN  acetaminophen, 650 mg, Q6H PRN   Or  acetaminophen, 650 mg, Q6H PRN  docusate sodium, 100 mg, Daily PRN  zolpidem, 5 mg, Nightly PRN  sodium chloride, , PRN  perflutren lipid microspheres, 1.5 mL, ONCE PRN         OBJECTIVE:    /62   Pulse 72   Temp 97.5 °F (36.4 °C) (Temporal)   Resp 16   Ht 1.676 m (5' 6\")   Wt 73.8 kg (162 lb 9.6 oz)   SpO2 96%   BMI 26.24 kg/m²     General Appearance: alert and oriented to person, place and time and in no acute distress  Skin: warm and dry  Head: normocephalic and atraumatic  Eyes:  pupils equal, round, and reactive to light, extraocular eye movements intact, conjunctivae normal  Neck: neck supple and non tender without mass   Pulmonary/Chest: Decreased breath sound bilaterally  Cardiovascular: normal rate, normal S1 and S2 and no carotid bruits  Abdomen: soft, non-tender, non-distended, normal bowel sounds, no masses or organomegaly  Extremities: no cyanosis, no clubbing and no edema  Neurologic: no cranial nerve deficit and speech normal    Recent Labs     06/23/24  0215 06/24/24  0608 06/25/24  1003    135 130*   K 3.2* 3.4* 4.5   CL 98 101 101   CO2 22 17* 16*   BUN 34* 32* 37*   CREATININE 2.2* 1.7* 1.9*   GLUCOSE 103* 156* 227*   CALCIUM 8.3* 8.8 8.8       Recent Labs     06/23/24  0215 06/23/24  0732 06/24/24  0608 06/24/24  1210 06/24/24  1750 06/25/24  0126 06/25/24  0625   WBC 72.1*  --  99.8*  --   --   --   --    RBC 1.90*  --  2.87*  --   --   --   --    HGB 5.8*   < > 8.7*   < > 8.3* 8.4* 8.2*   HCT 17.7*   < > 26.0*   < > 24.9* 25.1* 25.0*   MCV 93.2  --  90.6  --   --   --   --    MCH 30.5  --  30.3  --   --   --   --    MCHC 32.8  --  33.5  --   --   --   --    RDW 17.5*  --  19.1*  --   --   --   --    MPV 13.7*  --  10.1  --   --   --   --     < > = values in this interval not displayed.         ASSESSMENT AND PLAN:    Principal Problem:    Acute on chronic anemia  Active Problems:    Coronary artery disease    JANE (dyspnea on exertion)    Anemia    Elevated troponin    Hyperlipidemia, mixed    CHARANJIT (acute kidney injury) (HCC)    Acute decompensated heart failure (HCC)    Primary hypertension    Acute myeloid leukemia not having achieved remission (HCC)  Resolved Problems:    * No resolved hospital problems. *    Chest pain and shortness of breath  On presentation   Known coronary disease, status post multiple PCI  Cardiology following  Symptomatic treatment for patient request    Anemia  Hemoglobin 5.8 on presentation  S/p transfusion, hemoglobin

## 2024-06-25 NOTE — PLAN OF CARE
Problem: Chronic Conditions and Co-morbidities  Goal: Patient's chronic conditions and co-morbidity symptoms are monitored and maintained or improved  6/25/2024 1028 by Mirian Menard RN  Outcome: Progressing     Problem: Discharge Planning  Goal: Discharge to home or other facility with appropriate resources  6/25/2024 1028 by Mirian Menard RN  Outcome: Progressing     Problem: Pain  Goal: Verbalizes/displays adequate comfort level or baseline comfort level  6/25/2024 1028 by Mirian Menard RN  Outcome: Progressing     Problem: Safety - Adult  Goal: Free from fall injury  6/25/2024 1028 by Mirian Menard RN  Outcome: Progressing     Problem: Skin/Tissue Integrity  Goal: Absence of new skin breakdown  Description: 1.  Monitor for areas of redness and/or skin breakdown  2.  Assess vascular access sites hourly  3.  Every 4-6 hours minimum:  Change oxygen saturation probe site  4.  Every 4-6 hours:  If on nasal continuous positive airway pressure, respiratory therapy assess nares and determine need for appliance change or resting period.  6/25/2024 1028 by Mirian Menard RN  Outcome: Progressing     Problem: ABCDS Injury Assessment  Goal: Absence of physical injury  Outcome: Progressing

## 2024-06-25 NOTE — PLAN OF CARE
Problem: Chronic Conditions and Co-morbidities  Goal: Patient's chronic conditions and co-morbidity symptoms are monitored and maintained or improved  6/25/2024 0153 by Marlon Wilks RN  Outcome: Progressing     Problem: Discharge Planning  Goal: Discharge to home or other facility with appropriate resources  6/25/2024 0153 by Marlon Wilks RN  Outcome: Progressing     Problem: Pain  Goal: Verbalizes/displays adequate comfort level or baseline comfort level  6/25/2024 0153 by Marlon Wilks RN  Outcome: Progressing     Problem: Safety - Adult  Goal: Free from fall injury  6/25/2024 0153 by Marlon Wilks RN  Outcome: Progressing     Problem: Skin/Tissue Integrity  Goal: Absence of new skin breakdown  Description: 1.  Monitor for areas of redness and/or skin breakdown  2.  Assess vascular access sites hourly  3.  Every 4-6 hours minimum:  Change oxygen saturation probe site  4.  Every 4-6 hours:  If on nasal continuous positive airway pressure, respiratory therapy assess nares and determine need for appliance change or resting period.  Outcome: Progressing

## 2024-06-25 NOTE — PROGRESS NOTES
Palliative Care Department  [phone]  Palliative Care Progress Note  Provider Elzbieta Bass, VIKTORIYA - CNP      PATIENT: Frederick Santiago  : 1935  MRN: [id number]  ADMISSION DATE: 2024  2:17 AM  Referring Provider:  Chip Adamson MD    Palliative Medicine was consulted on hospital day 2 for assistance with Goals of care, Code Status Discussion, overwhelmed symptoms, psychosocial distress  HPI:     Clinical Summary:Frederick Santiago is a 88 y.o. y/o male with a history of CAD, AML, CKD, mild VHD, HFpEF, hypertension, hyperlipidemia, diabetes, PAD, GERD, gout, BPH, with recent hospitalization for chest pain and anemia, cardiology has seen patient, with findings of chest pain related to demand ischemia from anemia, who presented to [facility] on 2024 with shortness of breath, weakness, bilateral lower extremity edema, chest pain has received 1 nitro at home.  At ED, significant laboratory findings showed hemoglobin 5.8, platelets 33, WBC 72.1, proBNP 11,590, troponin 203, BUN 34, creatinine 2.2.  Chest x-ray shows right basilar airspace opacity with a small right pleural effusion.  Findings concerning for pneumonia.  Cardiology, nephrology, and oncology consulted.    ASSESSMENT/PLAN:     Pertinent Hospital Diagnoses     Chest pain  Known coronary artery disease SP PCI  Severe anemia  Decompensated HFpEF  CHARANJIT on CKD  AML  Follows with Dr. Garcia  On hydroxyurea      Palliative Care Encounter / Counseling Regarding Goals of Care  Please see detailed goals of care discussion as below  At this time, Frederick Santiago, Does have capacity for medical decision-making.  Capacity is time limited and situation/question specific  Outcome of goals of care meeting:  Wants to pursue comfort measures only  He would like to discuss option with hospice, when his wife is at the bedside  CODE STATUS discussed and changed to DNR CC  Comfort medications already in placed  Will continue to follow  Code status DNR-CC  Advanced  Directives: no POA or living will in epic  Surrogate/Legal NOK:  Taylor Bella (Spouse) [phone]    Spiritual assessment: no spiritual distress identified  Bereavement and grief: to be determined  Referrals to: none today    Thank you for the opportunity to participate in the care of Frederick Santiago.     VIKTORIYA Mcclain CNP  Palliative Medicine     SUBJECTIVE:     Details of Conversation:      Patient seen at the bedside, confirming plan is to be discharged to facility with hospice, currently spouse touring facilities.  Patient has no generalized comfort medications.  Stating that he is feeling a little bit better, however looks ill and deconditioned.  Will continue to follow    Prognosis: Poor    OBJECTIVE:     /62   Pulse 72   Temp 97.5 °F (36.4 °C) (Temporal)   Resp 16   Ht 1.676 m (5' 6\")   Wt 73.8 kg (162 lb 9.6 oz)   SpO2 96%   BMI 26.24 kg/m²     Physical Examination:  Gen: elderly, thin, awake, alert, weak and ill-appearing  Lungs: respirations easy and not labored,   Heart: regular rate and rhythm, distant heart tones,   Abdomen:  soft, non-tender  Extremities:edema, moving all extremities    Neuro: awake, alert, oriented x 3, follows commands, no gross neurologic deficit    Objective data reviewed: labs, images, records, medication use, vitals, and chart    Time/Communication  Greater than 50% of time spent, total 25 minutes in counseling and coordination of care at the bedside regarding  CODE STATUS discussion, psychosocial distress, goals of care, and symptom management.    Thank you for allowing Palliative Medicine to participate in the care of Frederick Santiago.    Note: This report was completed using computerEfizity voiced recognition software.  Every effort has been made to ensure accuracy; however, inadvertent computerized transcription errors may be present.

## 2024-06-26 VITALS
OXYGEN SATURATION: 99 % | BODY MASS INDEX: 26.18 KG/M2 | WEIGHT: 162.9 LBS | SYSTOLIC BLOOD PRESSURE: 107 MMHG | TEMPERATURE: 98 F | RESPIRATION RATE: 18 BRPM | HEART RATE: 72 BPM | HEIGHT: 66 IN | DIASTOLIC BLOOD PRESSURE: 63 MMHG

## 2024-06-26 LAB
HCT VFR BLD AUTO: 22.1 % (ref 37–54)
HCT VFR BLD AUTO: 22.3 % (ref 37–54)
HGB BLD-MCNC: 7.1 G/DL (ref 12.5–16.5)
HGB BLD-MCNC: 7.3 G/DL (ref 12.5–16.5)

## 2024-06-26 PROCEDURE — 6360000002 HC RX W HCPCS: Performed by: INTERNAL MEDICINE

## 2024-06-26 PROCEDURE — 6370000000 HC RX 637 (ALT 250 FOR IP): Performed by: INTERNAL MEDICINE

## 2024-06-26 PROCEDURE — 85018 HEMOGLOBIN: CPT

## 2024-06-26 PROCEDURE — 6360000002 HC RX W HCPCS

## 2024-06-26 PROCEDURE — 94640 AIRWAY INHALATION TREATMENT: CPT

## 2024-06-26 PROCEDURE — 2700000000 HC OXYGEN THERAPY PER DAY

## 2024-06-26 PROCEDURE — 99239 HOSP IP/OBS DSCHRG MGMT >30: CPT | Performed by: INTERNAL MEDICINE

## 2024-06-26 PROCEDURE — 85014 HEMATOCRIT: CPT

## 2024-06-26 PROCEDURE — 36415 COLL VENOUS BLD VENIPUNCTURE: CPT

## 2024-06-26 PROCEDURE — 2580000003 HC RX 258: Performed by: INTERNAL MEDICINE

## 2024-06-26 RX ORDER — IPRATROPIUM BROMIDE AND ALBUTEROL SULFATE 2.5; .5 MG/3ML; MG/3ML
3 SOLUTION RESPIRATORY (INHALATION)
Qty: 360 ML | Refills: 2 | Status: SHIPPED | OUTPATIENT
Start: 2024-06-26 | End: 2024-06-28

## 2024-06-26 RX ORDER — BUDESONIDE 0.5 MG/2ML
0.5 INHALANT ORAL
Qty: 60 EACH | Refills: 3 | Status: SHIPPED | OUTPATIENT
Start: 2024-06-26 | End: 2024-06-28

## 2024-06-26 RX ORDER — MORPHINE SULFATE 2 MG/ML
2 INJECTION, SOLUTION INTRAMUSCULAR; INTRAVENOUS
Status: DISCONTINUED | OUTPATIENT
Start: 2024-06-26 | End: 2024-06-26 | Stop reason: HOSPADM

## 2024-06-26 RX ORDER — NITROGLYCERIN 0.4 MG/1
0.4 TABLET SUBLINGUAL EVERY 5 MIN PRN
Qty: 25 TABLET | Refills: 3 | Status: SHIPPED | OUTPATIENT
Start: 2024-06-26 | End: 2024-06-28

## 2024-06-26 RX ORDER — FUROSEMIDE 20 MG/1
20 TABLET ORAL ONCE
Status: COMPLETED | OUTPATIENT
Start: 2024-06-26 | End: 2024-06-26

## 2024-06-26 RX ORDER — FUROSEMIDE 20 MG/1
20 TABLET ORAL DAILY
Qty: 60 TABLET | Refills: 3 | Status: SHIPPED | OUTPATIENT
Start: 2024-06-27 | End: 2024-06-28

## 2024-06-26 RX ORDER — ISOSORBIDE MONONITRATE 60 MG/1
60 TABLET, EXTENDED RELEASE ORAL DAILY
Qty: 30 TABLET | Refills: 3 | Status: SHIPPED | OUTPATIENT
Start: 2024-06-27 | End: 2024-06-28

## 2024-06-26 RX ORDER — PANTOPRAZOLE SODIUM 40 MG/1
40 TABLET, DELAYED RELEASE ORAL
Qty: 30 TABLET | Refills: 3 | Status: SHIPPED | OUTPATIENT
Start: 2024-06-27 | End: 2024-06-28

## 2024-06-26 RX ORDER — CARVEDILOL 25 MG/1
25 TABLET ORAL 2 TIMES DAILY WITH MEALS
Qty: 60 TABLET | Refills: 3 | Status: SHIPPED | OUTPATIENT
Start: 2024-06-26 | End: 2024-06-28

## 2024-06-26 RX ORDER — POLYETHYLENE GLYCOL 3350 17 G/17G
17 POWDER, FOR SOLUTION ORAL DAILY PRN
Qty: 30 PACKET | Refills: 0 | Status: SHIPPED | OUTPATIENT
Start: 2024-06-26 | End: 2024-06-28

## 2024-06-26 RX ADMIN — FUROSEMIDE 20 MG: 20 TABLET ORAL at 08:32

## 2024-06-26 RX ADMIN — MORPHINE SULFATE 2 MG: 2 INJECTION, SOLUTION INTRAMUSCULAR; INTRAVENOUS at 14:21

## 2024-06-26 RX ADMIN — IPRATROPIUM BROMIDE AND ALBUTEROL SULFATE 1 DOSE: .5; 2.5 SOLUTION RESPIRATORY (INHALATION) at 16:44

## 2024-06-26 RX ADMIN — SODIUM CHLORIDE, PRESERVATIVE FREE 10 ML: 5 INJECTION INTRAVENOUS at 08:32

## 2024-06-26 RX ADMIN — Medication 500 MCG: at 08:32

## 2024-06-26 RX ADMIN — SODIUM BICARBONATE 650 MG: 650 TABLET ORAL at 13:36

## 2024-06-26 RX ADMIN — IPRATROPIUM BROMIDE AND ALBUTEROL SULFATE 1 DOSE: .5; 2.5 SOLUTION RESPIRATORY (INHALATION) at 13:20

## 2024-06-26 RX ADMIN — ISOSORBIDE MONONITRATE 60 MG: 30 TABLET, EXTENDED RELEASE ORAL at 08:31

## 2024-06-26 RX ADMIN — IPRATROPIUM BROMIDE AND ALBUTEROL SULFATE 1 DOSE: .5; 2.5 SOLUTION RESPIRATORY (INHALATION) at 09:11

## 2024-06-26 RX ADMIN — CARVEDILOL 25 MG: 25 TABLET, FILM COATED ORAL at 16:37

## 2024-06-26 RX ADMIN — FUROSEMIDE 20 MG: 20 TABLET ORAL at 09:40

## 2024-06-26 RX ADMIN — BUDESONIDE INHALATION 500 MCG: 0.5 SUSPENSION RESPIRATORY (INHALATION) at 09:11

## 2024-06-26 RX ADMIN — CARVEDILOL 25 MG: 25 TABLET, FILM COATED ORAL at 08:31

## 2024-06-26 RX ADMIN — MORPHINE SULFATE 2 MG: 2 INJECTION, SOLUTION INTRAMUSCULAR; INTRAVENOUS at 10:05

## 2024-06-26 RX ADMIN — PANTOPRAZOLE SODIUM 40 MG: 40 TABLET, DELAYED RELEASE ORAL at 05:25

## 2024-06-26 RX ADMIN — SODIUM BICARBONATE 650 MG: 650 TABLET ORAL at 08:32

## 2024-06-26 ASSESSMENT — PAIN SCALES - GENERAL
PAINLEVEL_OUTOF10: 0
PAINLEVEL_OUTOF10: 0

## 2024-06-26 NOTE — DISCHARGE SUMMARY
[facility] Hospitalist Physician Discharge Summary       No follow-up provider specified.    Activity level: As tolerated     Dispo: hospice       Patient ID:  Frederick Santiago  [id number]  88 y.o.  1935    Admit date: 6/23/2024    Discharge date and time:  6/26/2024  1:22 PM    Admission Diagnoses: Principal Problem:    Acute on chronic anemia  Active Problems:    Coronary artery disease    JANE (dyspnea on exertion)    Anemia    Elevated troponin    Hyperlipidemia, mixed    CHARANJIT (acute kidney injury) (HCC)    Acute decompensated heart failure (HCC)    Primary hypertension    Acute myeloid leukemia not having achieved remission (HCC)  Resolved Problems:    * No resolved hospital problems. *      Discharge Diagnoses: Principal Problem:    Acute on chronic anemia  Active Problems:    Coronary artery disease    JANE (dyspnea on exertion)    Anemia    Elevated troponin    Hyperlipidemia, mixed    CHARANJIT (acute kidney injury) (HCC)    Acute decompensated heart failure (HCC)    Primary hypertension    Acute myeloid leukemia not having achieved remission (HCC)  Resolved Problems:    * No resolved hospital problems. *      Consults:  IP CONSULT TO CARDIOLOGY  IP CONSULT TO ONCOLOGY  IP CONSULT TO NEPHROLOGY  PHARMACY TO DOSE VANCOMYCIN  IP CONSULT TO PALLIATIVE CARE  IP CONSULT TO HOSPICE  IP CONSULT TO HEART FAILURE NURSE/COORDINATOR    Hospital Course:   Patient Frederick Santiago is a 88 y.o. presented with Elevated troponin [R79.89]  Pneumonia of right lung due to infectious organism, unspecified part of lung [J18.9]  Anemia, unspecified type [D64.9]  Chest pain, unspecified type [R07.9]  Acute on chronic anemia [D64.9]    Frederick presents to ER for shortness of breath, chest pressure.  This started a few hours prior to presentation to the ER.  This is worse with exertion.  Shortness of breath has been an ongoing issue but is worsened over the past several days.  He does admit to some dry cough.  He started having

## 2024-06-26 NOTE — CARE COORDINATION
Transition of care update: [facility]) to ton pt for Hospice House.     1315  Pt going to the [facility] at 5pm today. Met with pt's wife in room. Pt was resting quietly up in chair. O2 at 3l/nc. Pt's wife voiced no needs. Updated [facility] on pt's dc plan. Pt's nurse was updated by [facility] rn.

## 2024-06-26 NOTE — PATIENT CARE CONFERENCE
[facility] Quality Flow/Interdisciplinary Rounds Progress Note        Quality Flow Rounds held on June 26, 2024    Disciplines Attending:  Bedside Nurse, , , and Nursing Unit Leadership    Frederick Santiago was admitted on 6/23/2024  2:17 AM    Anticipated Discharge Date:       Disposition:    Ambroes Score:  Ambrose Scale Score: 19    Readmission Risk              Risk of Unplanned Readmission:  27           Discussed patient goal for the day, patient clinical progression, and barriers to discharge.  The following Goal(s) of the Day/Commitment(s) have been identified:  downgrade/discharge planning       Vicki Carbajal RN  June 26, 2024

## 2024-06-26 NOTE — PROGRESS NOTES
Follow up visit made to the bedside. No family present at this time. Pt is sitting in chair. Pt is visibly SOB and states he can not lay in the bed as he can't breath. Discussed [facility]OT[facility] liaison's concern with pt's comfort level. Pt is agreeable to IV morphine to attempt to get breathing at a more comfortable level. Updates provided to pall med and bedside nurse.     1100 - visit made to the bedside. Wife and 2 sons at the bedside. Pt has received dose of comfort meds and remains symptomatic. Discussion with wife and sons about hospice house. Pt and family are agreeable. Call placed to Dr. Mak. Awaiting return call. Updates provided to CM.     Electronically signed by Steffi Tipton RN on 6/26/2024 at 11:27 AM  [phone]    1200 - Dr. Mak has accepted the pt for GIP level of care at the hospice [place]. Please leave all IV sites intact. N2N provided by [facility]. Transport pack in soft chart. [facility] to transport pt at 5 pm. Please continue to medicate for SOB/pain. Updates provided to CM and bedside nurse.     Electronically signed by Steffi Tipton RN on 6/26/2024 at 1:59 PM  [phone]

## 2024-06-26 NOTE — PLAN OF CARE
Problem: Chronic Conditions and Co-morbidities  Goal: Patient's chronic conditions and co-morbidity symptoms are monitored and maintained or improved  6/26/2024 0841 by Favio Navarro RN  Outcome: Progressing  6/25/2024 2117 by Sari Cobb RN  Outcome: Progressing     Problem: Discharge Planning  Goal: Discharge to home or other facility with appropriate resources  6/26/2024 0841 by Favio Navarro RN  Outcome: Progressing  6/25/2024 2117 by Sari Cobb RN  Outcome: Progressing     Problem: Pain  Goal: Verbalizes/displays adequate comfort level or baseline comfort level  6/26/2024 0841 by Favio Navarro RN  Outcome: Progressing  6/25/2024 2117 by Sari Cobb RN  Outcome: Progressing     Problem: Safety - Adult  Goal: Free from fall injury  6/26/2024 0841 by Favio Navarro RN  Outcome: Progressing  6/25/2024 2117 by Sari Cobb RN  Outcome: Progressing     Problem: Skin/Tissue Integrity  Goal: Absence of new skin breakdown  Description: 1.  Monitor for areas of redness and/or skin breakdown  2.  Assess vascular access sites hourly  3.  Every 4-6 hours minimum:  Change oxygen saturation probe site  4.  Every 4-6 hours:  If on nasal continuous positive airway pressure, respiratory therapy assess nares and determine need for appliance change or resting period.  6/26/2024 0841 by Favio Navarro RN  Outcome: Progressing  6/25/2024 2117 by Sari Cobb RN  Outcome: Progressing     Problem: ABCDS Injury Assessment  Goal: Absence of physical injury  6/26/2024 0841 by Favio Navarro RN  Outcome: Progressing  6/25/2024 2117 by Sari Cobb RN  Outcome: Progressing

## 2024-06-26 NOTE — PLAN OF CARE
Problem: Chronic Conditions and Co-morbidities  Goal: Patient's chronic conditions and co-morbidity symptoms are monitored and maintained or improved  6/25/2024 2117 by Sari Cobb RN  Outcome: Progressing  6/25/2024 1028 by Mirian Menard RN  Outcome: Progressing     Problem: Discharge Planning  Goal: Discharge to home or other facility with appropriate resources  6/25/2024 2117 by Sari Cobb RN  Outcome: Progressing  6/25/2024 1028 by Mirian Menard RN  Outcome: Progressing     Problem: Pain  Goal: Verbalizes/displays adequate comfort level or baseline comfort level  6/25/2024 2117 by Sari Cobb RN  Outcome: Progressing  6/25/2024 1028 by Mirian Menard RN  Outcome: Progressing     Problem: Safety - Adult  Goal: Free from fall injury  6/25/2024 2117 by Sari Cobb RN  Outcome: Progressing  6/25/2024 1028 by Mirian Menard RN  Outcome: Progressing     Problem: Skin/Tissue Integrity  Goal: Absence of new skin breakdown  Description: 1.  Monitor for areas of redness and/or skin breakdown  2.  Assess vascular access sites hourly  3.  Every 4-6 hours minimum:  Change oxygen saturation probe site  4.  Every 4-6 hours:  If on nasal continuous positive airway pressure, respiratory therapy assess nares and determine need for appliance change or resting period.  6/25/2024 2117 by Sari Cobb RN  Outcome: Progressing  6/25/2024 1028 by Mirian Menard RN  Outcome: Progressing     Problem: ABCDS Injury Assessment  Goal: Absence of physical injury  6/25/2024 2117 by Sari Cobb RN  Outcome: Progressing  6/25/2024 1028 by Mirian Menard RN  Outcome: Progressing

## 2024-06-27 LAB
EKG ATRIAL RATE: 92 BPM
EKG P AXIS: 62 DEGREES
EKG P-R INTERVAL: 162 MS
EKG Q-T INTERVAL: 368 MS
EKG QRS DURATION: 88 MS
EKG QTC CALCULATION (BAZETT): 455 MS
EKG R AXIS: -14 DEGREES
EKG T AXIS: 65 DEGREES
EKG VENTRICULAR RATE: 92 BPM

## 2024-06-27 PROCEDURE — 93010 ELECTROCARDIOGRAM REPORT: CPT | Performed by: INTERNAL MEDICINE

## 2024-06-28 LAB
MICROORGANISM SPEC CULT: NORMAL
MICROORGANISM SPEC CULT: NORMAL
SERVICE CMNT-IMP: NORMAL
SERVICE CMNT-IMP: NORMAL
SPECIMEN DESCRIPTION: NORMAL
SPECIMEN DESCRIPTION: NORMAL

## 2024-06-29 NOTE — PROGRESS NOTES
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   Noted documentation of   acute on chronic anemia in H&P and acute blood loss anemia per cardiology   consult note dated 24.    If possible, please document in progress notes and discharge summary if you   are evaluating and /or treating any of the following:    The medical record reflects the following:  Risk Factors: AML, CKD, takes hydroxyurea  Clinical Indicators: ED provider note- Patient reports no black or tarry   stools. He reports no hematemesis.  H&P- Principal problem: acute on chronic anemia.   AML/leukocytosis/anemia/thrombocytopenia.   cardiology consult note- Acute blood loss anemia with hgb of 5.8. Chronic   anemia s/p PRBC as needed.   heme/onc consult note- AML on hydroxyurea twice daily. No bleeding or   bruising. Admitted with symptomatic anemia. Worsening leukocytosis, anemia,   thrombocytopenia. Due to progression of AML.   H&H- 5.8/17.7   H&H- 8.7/26.0   H&H- 8.4/25.1   H&H- 7.3/22.1  Treatment: blood transfusion, H&H monitoring, hematology/oncology consult,   nephrology consult, cardiology consult    Thank you,  Nan Pascal, MSN, RN  Clinical Documentation Integrity  [phone]  Options provided:  -- Acute on chronic anemia due to malignancy confirmed and acute blood loss   anemia ruled out.  -- Acute blood loss anemia ruled out, acute on chronic anemia due to other   confirmed., Please specify etiology.  -- Acute on chronic anemia due to malignancy confirmed and acute blood loss   anemia confirmed as evidenced by, Please provide further clinical evidence.  -- Other - I will add my own diagnosis  -- Disagree - Not

## 2024-07-03 ENCOUNTER — COMMUNITY CARE MANAGEMENT (OUTPATIENT)
Facility: CLINIC | Age: 89
End: 2024-07-03

## 2024-07-05 LAB
EKG ATRIAL RATE: 85 BPM
EKG P AXIS: 37 DEGREES
EKG P-R INTERVAL: 176 MS
EKG Q-T INTERVAL: 390 MS
EKG QRS DURATION: 96 MS
EKG QTC CALCULATION (BAZETT): 464 MS
EKG R AXIS: -12 DEGREES
EKG T AXIS: 55 DEGREES
EKG VENTRICULAR RATE: 85 BPM

## 2024-07-08 NOTE — PROGRESS NOTES
Physician Progress Note      PATIENT:               BRANDYN BROWN  [facility] #:                  [id number]  :                       1935  ADMIT DATE:       2024 2:17 AM  DISCH DATE:        2024 5:29 PM  RESPONDING  PROVIDER #:        Chip Lima MD          QUERY TEXT:    Pt admitted with AML, anemia.  Pt noted to have pneumonia per discharge   summary. If possible, please document if you are evaluating and/or treating   any of the following:    Note: CAP and HCAP indicate where the pneumonia was acquired, not a specific   type.    The medical record reflects the following:  Risk Factors: recent hospitalization, malignancy  Clinical Indicators: Discharge summary- pneumonia of right lung due to   infectious organism.   CXR- right basilar airspace opacities with small right pleural effusion.   Findings are concerning for pneumonia.   WBC 72.1   WBC 99.8  Treatment: vancomycin, cefepime, IVF, Pulmicort, laboratory studies, chest   x-ray    Thank you,  Nan Pascal, MSN, RN  Clinical Documentation Integrity  [phone]  Options provided:  -- Gram negative pneumonia  -- Gram positive pneumonia  -- Other - I will add my own diagnosis  -- Disagree - Not applicable / Not valid  -- Disagree - Clinically unable to determine / Unknown  -- Refer to Clinical Documentation Reviewer    PROVIDER RESPONSE TEXT:    Provider disagreed with this query.    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  Noted documentation of   Acute Kidney Injury in H&P.  In order to support the diagnosis of CHARANJIT, please   include additional clinical indicators in your documentation.? Or please   document if the diagnosis of CHARANJIT has been ruled out after further study.    The medical record reflects the following:  Risk Factors: CKD, acute anemia  Clinical Indicators: H&P- Active problems: CHARANJIT on CKD- monitor Cr. Nephro

## 2024-11-27 NOTE — ED NOTES
Nurse report given to [id number].   Patient called to request a refill for their simvaststin 20mg advised a refill was requested on 11/27/24 and is pending approval. Patient verbalized understanding and is in agreement.